# Patient Record
Sex: FEMALE | Race: WHITE | NOT HISPANIC OR LATINO | Employment: FULL TIME | URBAN - METROPOLITAN AREA
[De-identification: names, ages, dates, MRNs, and addresses within clinical notes are randomized per-mention and may not be internally consistent; named-entity substitution may affect disease eponyms.]

---

## 2017-01-26 ENCOUNTER — OFFICE VISIT - HEALTHEAST (OUTPATIENT)
Dept: FAMILY MEDICINE | Facility: CLINIC | Age: 57
End: 2017-01-26

## 2017-01-26 DIAGNOSIS — D23.9 DYSPLASTIC NEVI: ICD-10-CM

## 2017-01-26 DIAGNOSIS — B00.89 HERPES DERMATITIS: ICD-10-CM

## 2017-01-26 DIAGNOSIS — E55.9 VITAMIN D DEFICIENCY: ICD-10-CM

## 2017-01-26 DIAGNOSIS — R06.83 SNORING: ICD-10-CM

## 2017-01-26 DIAGNOSIS — Z00.00 ROUTINE GENERAL MEDICAL EXAMINATION AT A HEALTH CARE FACILITY: ICD-10-CM

## 2017-01-26 DIAGNOSIS — R35.0 URINARY FREQUENCY: ICD-10-CM

## 2017-01-26 DIAGNOSIS — D12.6 ADENOMATOUS COLON POLYP: ICD-10-CM

## 2017-01-26 DIAGNOSIS — L72.3 SEBACEOUS CYST: ICD-10-CM

## 2017-01-26 DIAGNOSIS — R53.83 FATIGUE: ICD-10-CM

## 2017-01-26 DIAGNOSIS — G43.009 MIGRAINE WITHOUT AURA: ICD-10-CM

## 2017-01-26 DIAGNOSIS — E78.5 HYPERLIPIDEMIA: ICD-10-CM

## 2017-01-26 DIAGNOSIS — K92.1 BLOOD IN STOOL: ICD-10-CM

## 2017-01-26 LAB
CHOLEST SERPL-MCNC: 211 MG/DL
FASTING STATUS PATIENT QL REPORTED: ABNORMAL
HDLC SERPL-MCNC: 63 MG/DL
LDLC SERPL CALC-MCNC: 132 MG/DL
TRIGL SERPL-MCNC: 80 MG/DL

## 2017-01-26 ASSESSMENT — MIFFLIN-ST. JEOR: SCORE: 1341.63

## 2017-02-06 ENCOUNTER — HOSPITAL ENCOUNTER (OUTPATIENT)
Dept: MAMMOGRAPHY | Facility: HOSPITAL | Age: 57
Discharge: HOME | End: 2017-02-06
Attending: FAMILY MEDICINE

## 2017-02-06 DIAGNOSIS — L72.3 SEBACEOUS CYST: ICD-10-CM

## 2017-02-06 DIAGNOSIS — Z00.00 ROUTINE GENERAL MEDICAL EXAMINATION AT A HEALTH CARE FACILITY: ICD-10-CM

## 2017-02-13 ENCOUNTER — AMBULATORY - HEALTHEAST (OUTPATIENT)
Dept: FAMILY MEDICINE | Facility: CLINIC | Age: 57
End: 2017-02-13

## 2017-02-13 ENCOUNTER — COMMUNICATION - HEALTHEAST (OUTPATIENT)
Dept: FAMILY MEDICINE | Facility: CLINIC | Age: 57
End: 2017-02-13

## 2017-02-15 ENCOUNTER — AMBULATORY - HEALTHEAST (OUTPATIENT)
Dept: SURGERY | Facility: CLINIC | Age: 57
End: 2017-02-15

## 2017-02-15 ENCOUNTER — OFFICE VISIT - HEALTHEAST (OUTPATIENT)
Dept: SURGERY | Facility: CLINIC | Age: 57
End: 2017-02-15

## 2017-02-15 DIAGNOSIS — N63.10 BREAST MASS, RIGHT: ICD-10-CM

## 2017-02-15 ASSESSMENT — MIFFLIN-ST. JEOR: SCORE: 1346.17

## 2017-02-16 ENCOUNTER — RECORDS - HEALTHEAST (OUTPATIENT)
Dept: ADMINISTRATIVE | Facility: OTHER | Age: 57
End: 2017-02-16

## 2017-02-16 ENCOUNTER — AMBULATORY - HEALTHEAST (OUTPATIENT)
Dept: SURGERY | Facility: CLINIC | Age: 57
End: 2017-02-16

## 2017-03-01 ENCOUNTER — OFFICE VISIT - HEALTHEAST (OUTPATIENT)
Dept: SURGERY | Facility: CLINIC | Age: 57
End: 2017-03-01

## 2017-03-01 DIAGNOSIS — Z48.89 POSTOPERATIVE VISIT: ICD-10-CM

## 2017-03-01 ASSESSMENT — MIFFLIN-ST. JEOR: SCORE: 1341.63

## 2017-03-10 ENCOUNTER — RECORDS - HEALTHEAST (OUTPATIENT)
Dept: ADMINISTRATIVE | Facility: OTHER | Age: 57
End: 2017-03-10

## 2017-03-23 ENCOUNTER — OFFICE VISIT - HEALTHEAST (OUTPATIENT)
Dept: FAMILY MEDICINE | Facility: CLINIC | Age: 57
End: 2017-03-23

## 2017-03-23 DIAGNOSIS — R05.9 COUGH: ICD-10-CM

## 2017-03-23 DIAGNOSIS — J01.00 ACUTE MAXILLARY SINUSITIS, RECURRENCE NOT SPECIFIED: ICD-10-CM

## 2017-04-13 ENCOUNTER — OFFICE VISIT - HEALTHEAST (OUTPATIENT)
Dept: FAMILY MEDICINE | Facility: CLINIC | Age: 57
End: 2017-04-13

## 2017-04-13 DIAGNOSIS — Z79.899 MEDICATION MANAGEMENT: ICD-10-CM

## 2017-07-13 ENCOUNTER — OFFICE VISIT - HEALTHEAST (OUTPATIENT)
Dept: FAMILY MEDICINE | Facility: CLINIC | Age: 57
End: 2017-07-13

## 2017-07-13 DIAGNOSIS — Z79.899 HIGH RISK MEDICATION USE: ICD-10-CM

## 2017-10-12 ENCOUNTER — OFFICE VISIT - HEALTHEAST (OUTPATIENT)
Dept: FAMILY MEDICINE | Facility: CLINIC | Age: 57
End: 2017-10-12

## 2017-10-12 DIAGNOSIS — G43.009 MIGRAINE WITHOUT AURA: ICD-10-CM

## 2017-10-12 DIAGNOSIS — Z79.899 HIGH RISK MEDICATION USE: ICD-10-CM

## 2017-10-18 ENCOUNTER — COMMUNICATION - HEALTHEAST (OUTPATIENT)
Dept: FAMILY MEDICINE | Facility: CLINIC | Age: 57
End: 2017-10-18

## 2017-11-15 ENCOUNTER — RECORDS - HEALTHEAST (OUTPATIENT)
Dept: ADMINISTRATIVE | Facility: OTHER | Age: 57
End: 2017-11-15

## 2017-12-14 ENCOUNTER — OFFICE VISIT - HEALTHEAST (OUTPATIENT)
Dept: FAMILY MEDICINE | Facility: CLINIC | Age: 57
End: 2017-12-14

## 2017-12-14 DIAGNOSIS — J32.9 SINOBRONCHITIS: ICD-10-CM

## 2017-12-14 DIAGNOSIS — J40 SINOBRONCHITIS: ICD-10-CM

## 2017-12-18 ENCOUNTER — OFFICE VISIT - HEALTHEAST (OUTPATIENT)
Dept: FAMILY MEDICINE | Facility: CLINIC | Age: 57
End: 2017-12-18

## 2017-12-18 ENCOUNTER — RECORDS - HEALTHEAST (OUTPATIENT)
Dept: ADMINISTRATIVE | Facility: OTHER | Age: 57
End: 2017-12-18

## 2017-12-18 DIAGNOSIS — Z01.818 PRE-OP EXAM: ICD-10-CM

## 2017-12-18 ASSESSMENT — MIFFLIN-ST. JEOR: SCORE: 1272.9

## 2017-12-27 RX ORDER — PHENTERMINE HYDROCHLORIDE 37.5 MG/1
37.5 CAPSULE ORAL DAILY
COMMUNITY
End: 2021-07-30

## 2017-12-28 ENCOUNTER — ANESTHESIA (OUTPATIENT)
Dept: SURGERY | Facility: CLINIC | Age: 57
End: 2017-12-28
Payer: COMMERCIAL

## 2017-12-28 ENCOUNTER — HOSPITAL ENCOUNTER (OUTPATIENT)
Facility: CLINIC | Age: 57
Discharge: HOME | End: 2017-12-28
Attending: OPHTHALMOLOGY | Admitting: OPHTHALMOLOGY
Payer: COMMERCIAL

## 2017-12-28 ENCOUNTER — ANESTHESIA EVENT (OUTPATIENT)
Dept: SURGERY | Facility: CLINIC | Age: 57
End: 2017-12-28
Payer: COMMERCIAL

## 2017-12-28 VITALS
RESPIRATION RATE: 16 BRPM | OXYGEN SATURATION: 95 % | SYSTOLIC BLOOD PRESSURE: 130 MMHG | HEIGHT: 63 IN | BODY MASS INDEX: 29.41 KG/M2 | DIASTOLIC BLOOD PRESSURE: 95 MMHG | WEIGHT: 166 LBS | TEMPERATURE: 98.2 F

## 2017-12-28 PROCEDURE — 40000170 ZZH STATISTIC PRE-PROCEDURE ASSESSMENT II: Performed by: OPHTHALMOLOGY

## 2017-12-28 PROCEDURE — 37000009 ZZH ANESTHESIA TECHNICAL FEE, EACH ADDTL 15 MIN: Performed by: OPHTHALMOLOGY

## 2017-12-28 PROCEDURE — 25000128 H RX IP 250 OP 636: Performed by: NURSE ANESTHETIST, CERTIFIED REGISTERED

## 2017-12-28 PROCEDURE — 25000125 ZZHC RX 250: Performed by: OPHTHALMOLOGY

## 2017-12-28 PROCEDURE — 36000058 ZZH SURGERY LEVEL 3 EA 15 ADDTL MIN: Performed by: OPHTHALMOLOGY

## 2017-12-28 PROCEDURE — 71000027 ZZH RECOVERY PHASE 2 EACH 15 MINS: Performed by: OPHTHALMOLOGY

## 2017-12-28 PROCEDURE — 37000008 ZZH ANESTHESIA TECHNICAL FEE, 1ST 30 MIN: Performed by: OPHTHALMOLOGY

## 2017-12-28 PROCEDURE — 36000056 ZZH SURGERY LEVEL 3 1ST 30 MIN: Performed by: OPHTHALMOLOGY

## 2017-12-28 PROCEDURE — 71000012 ZZH RECOVERY PHASE 1 LEVEL 1 FIRST HR: Performed by: OPHTHALMOLOGY

## 2017-12-28 PROCEDURE — 27210794 ZZH OR GENERAL SUPPLY STERILE: Performed by: OPHTHALMOLOGY

## 2017-12-28 PROCEDURE — 25000125 ZZHC RX 250: Performed by: NURSE ANESTHETIST, CERTIFIED REGISTERED

## 2017-12-28 RX ORDER — ONDANSETRON 4 MG/1
4 TABLET, ORALLY DISINTEGRATING ORAL EVERY 30 MIN PRN
Status: DISCONTINUED | OUTPATIENT
Start: 2017-12-28 | End: 2017-12-28 | Stop reason: HOSPADM

## 2017-12-28 RX ORDER — MEPERIDINE HYDROCHLORIDE 25 MG/ML
12.5 INJECTION INTRAMUSCULAR; INTRAVENOUS; SUBCUTANEOUS
Status: DISCONTINUED | OUTPATIENT
Start: 2017-12-28 | End: 2017-12-28 | Stop reason: HOSPADM

## 2017-12-28 RX ORDER — NALOXONE HYDROCHLORIDE 0.4 MG/ML
.1-.4 INJECTION, SOLUTION INTRAMUSCULAR; INTRAVENOUS; SUBCUTANEOUS
Status: DISCONTINUED | OUTPATIENT
Start: 2017-12-28 | End: 2017-12-28 | Stop reason: HOSPADM

## 2017-12-28 RX ORDER — HYDROMORPHONE HYDROCHLORIDE 1 MG/ML
.3-.5 INJECTION, SOLUTION INTRAMUSCULAR; INTRAVENOUS; SUBCUTANEOUS EVERY 10 MIN PRN
Status: DISCONTINUED | OUTPATIENT
Start: 2017-12-28 | End: 2017-12-28 | Stop reason: HOSPADM

## 2017-12-28 RX ORDER — SODIUM CHLORIDE, SODIUM LACTATE, POTASSIUM CHLORIDE, CALCIUM CHLORIDE 600; 310; 30; 20 MG/100ML; MG/100ML; MG/100ML; MG/100ML
INJECTION, SOLUTION INTRAVENOUS CONTINUOUS
Status: DISCONTINUED | OUTPATIENT
Start: 2017-12-28 | End: 2017-12-28 | Stop reason: HOSPADM

## 2017-12-28 RX ORDER — FENTANYL CITRATE 50 UG/ML
25-50 INJECTION, SOLUTION INTRAMUSCULAR; INTRAVENOUS
Status: DISCONTINUED | OUTPATIENT
Start: 2017-12-28 | End: 2017-12-28 | Stop reason: HOSPADM

## 2017-12-28 RX ORDER — FENTANYL CITRATE 50 UG/ML
INJECTION, SOLUTION INTRAMUSCULAR; INTRAVENOUS PRN
Status: DISCONTINUED | OUTPATIENT
Start: 2017-12-28 | End: 2017-12-28

## 2017-12-28 RX ORDER — PHYSOSTIGMINE SALICYLATE 1 MG/ML
1.2 INJECTION INTRAVENOUS
Status: DISCONTINUED | OUTPATIENT
Start: 2017-12-28 | End: 2017-12-28 | Stop reason: HOSPADM

## 2017-12-28 RX ORDER — ONDANSETRON 2 MG/ML
4 INJECTION INTRAMUSCULAR; INTRAVENOUS EVERY 30 MIN PRN
Status: DISCONTINUED | OUTPATIENT
Start: 2017-12-28 | End: 2017-12-28 | Stop reason: HOSPADM

## 2017-12-28 RX ORDER — SODIUM CHLORIDE, SODIUM LACTATE, POTASSIUM CHLORIDE, CALCIUM CHLORIDE 600; 310; 30; 20 MG/100ML; MG/100ML; MG/100ML; MG/100ML
INJECTION, SOLUTION INTRAVENOUS CONTINUOUS PRN
Status: DISCONTINUED | OUTPATIENT
Start: 2017-12-28 | End: 2017-12-28

## 2017-12-28 RX ORDER — FENTANYL CITRATE 50 UG/ML
25-50 INJECTION, SOLUTION INTRAMUSCULAR; INTRAVENOUS EVERY 5 MIN PRN
Status: DISCONTINUED | OUTPATIENT
Start: 2017-12-28 | End: 2017-12-28 | Stop reason: HOSPADM

## 2017-12-28 RX ORDER — PROPOFOL 10 MG/ML
INJECTION, EMULSION INTRAVENOUS PRN
Status: DISCONTINUED | OUTPATIENT
Start: 2017-12-28 | End: 2017-12-28

## 2017-12-28 RX ORDER — ERYTHROMYCIN 5 MG/G
OINTMENT OPHTHALMIC PRN
Status: DISCONTINUED | OUTPATIENT
Start: 2017-12-28 | End: 2017-12-28 | Stop reason: HOSPADM

## 2017-12-28 RX ORDER — MULTIVITAMIN WITH IRON
1 TABLET ORAL DAILY
COMMUNITY
End: 2021-12-22

## 2017-12-28 RX ORDER — LIDOCAINE HCL/EPINEPHRINE/PF 2%-1:200K
VIAL (ML) INJECTION PRN
Status: DISCONTINUED | OUTPATIENT
Start: 2017-12-28 | End: 2017-12-28 | Stop reason: HOSPADM

## 2017-12-28 RX ORDER — ONDANSETRON 2 MG/ML
INJECTION INTRAMUSCULAR; INTRAVENOUS PRN
Status: DISCONTINUED | OUTPATIENT
Start: 2017-12-28 | End: 2017-12-28

## 2017-12-28 RX ADMIN — PROPOFOL 80 MG: 10 INJECTION, EMULSION INTRAVENOUS at 12:33

## 2017-12-28 RX ADMIN — FENTANYL CITRATE 25 MCG: 50 INJECTION, SOLUTION INTRAMUSCULAR; INTRAVENOUS at 12:54

## 2017-12-28 RX ADMIN — FENTANYL CITRATE 25 MCG: 50 INJECTION, SOLUTION INTRAMUSCULAR; INTRAVENOUS at 13:02

## 2017-12-28 RX ADMIN — FENTANYL CITRATE 50 MCG: 50 INJECTION, SOLUTION INTRAMUSCULAR; INTRAVENOUS at 12:22

## 2017-12-28 RX ADMIN — ONDANSETRON 4 MG: 2 INJECTION INTRAMUSCULAR; INTRAVENOUS at 12:22

## 2017-12-28 RX ADMIN — MIDAZOLAM 2 MG: 1 INJECTION INTRAMUSCULAR; INTRAVENOUS at 12:22

## 2017-12-28 RX ADMIN — SODIUM CHLORIDE, POTASSIUM CHLORIDE, SODIUM LACTATE AND CALCIUM CHLORIDE: 600; 310; 30; 20 INJECTION, SOLUTION INTRAVENOUS at 12:19

## 2017-12-28 RX ADMIN — DEXMEDETOMIDINE HYDROCHLORIDE 8 MCG: 100 INJECTION, SOLUTION INTRAVENOUS at 12:22

## 2017-12-28 NOTE — ANESTHESIA PREPROCEDURE EVALUATION
Anesthesia Evaluation     . Pt has had prior anesthetic. Type: General           ROS/MED HX    ENT/Pulmonary:       Neurologic:     (+)migraines,     Cardiovascular:     (+) Dyslipidemia, ----. : . . . :. .       METS/Exercise Tolerance:     Hematologic:         Musculoskeletal:         GI/Hepatic:         Renal/Genitourinary:         Endo:         Psychiatric:         Infectious Disease:         Malignancy:         Other:                                    Anesthesia Plan      History & Physical Review  History and physical reviewed and following examination; no interval change.    ASA Status:  2 .        Plan for MAC with Intravenous induction. Maintenance will be TIVA.    PONV prophylaxis:  Ondansetron (or other 5HT-3) and Dexamethasone or Solumedrol       Postoperative Care  Postoperative pain management:  IV analgesics and Oral pain medications.      Consents  Anesthetic plan, risks, benefits and alternatives discussed with:  Patient..                          .

## 2017-12-28 NOTE — ANESTHESIA POSTPROCEDURE EVALUATION
Patient: Carole Reich    Procedure(s):  BILATERAL UPPER LID PTOSIS AND MECHANICAL PTOSIS REPAIR(MAC) - Wound Class: I-Clean    Diagnosis:BILATERAL UPPER LID PTOSIS AND MECHANICAL PTOSIS  Diagnosis Additional Information: No value filed.    Anesthesia Type:  MAC    Note:  Anesthesia Post Evaluation    Patient location during evaluation: PACU  Patient participation: Able to fully participate in evaluation  Level of consciousness: awake  Pain management: adequate  Airway patency: patent  Cardiovascular status: acceptable  Respiratory status: acceptable  Hydration status: acceptable  PONV: none     Anesthetic complications: None          Last vitals:  Vitals:    12/28/17 1351 12/28/17 1400 12/28/17 1415   BP: (!) 132/91 (!) 132/95 (!) 130/98   Resp: 19 11 16   Temp: 36.8  C (98.2  F)     SpO2: 95% 95% 93%         Electronically Signed By: Jerzy Saenz MD  December 28, 2017  2:30 PM

## 2017-12-28 NOTE — IP AVS SNAPSHOT
MRN:[id number]                      After Visit Summary   12/28/2017    Carole Reich    MRN: [id number]           Thank you!     Thank you for choosing [facility] for your care. Our goal is always to provide you with excellent care. Hearing back from our patients is one way we can continue to improve our services. Please take a few minutes to complete the written survey that you may receive in the mail after you visit with us. Thank you!        Patient Information     Date Of Birth          1960        About your hospital stay     You were admitted on:  December 28, 2017 You last received care in the:  [facility]    You were discharged on:  December 28, 2017       Who to Call     For medical emergencies, please call 911.  For non-urgent questions about your medical care, please call your primary care provider or clinic, [phone]  For questions related to your surgery, please call your surgery clinic        Attending Provider     Provider Specialty    Ryder Suresh MD Ophthalmology       Primary Care Provider Office Phone # Fax #    Minerva Lockhart [phone]      Further instructions from your care team       Same Day Surgery Discharge Instructions for  Sedation and General Anesthesia       It's not unusual to feel dizzy, light-headed or faint for up to 24 hours after surgery or while taking pain medication.  If you have these symptoms: sit for a few minutes before standing and have someone assist you when you get up to walk or use the bathroom.      You should rest and relax for the next 24 hours. We recommend you make arrangements to have an adult stay with you for at least 24 hours after your discharge.  Avoid hazardous and strenuous activity.      DO NOT DRIVE any vehicle or operate mechanical equipment for 24 hours following the end of your surgery.  Even though you may feel normal, your reactions may be affected by the medication  you have received.      Do not drink alcoholic beverages for 24 hours following surgery.       Slowly progress to your regular diet as you feel able. It's not unusual to feel nauseated and/or vomit after receiving anesthesia.  If you develop these symptoms, drink clear liquids (apple juice, ginger ale, broth, 7-up, etc. ) until you feel better.  If your nausea and vomiting persists for 24 hours, please notify your surgeon.        All narcotic pain medications, along with inactivity and anesthesia, can cause constipation. Drinking plenty of liquids and increasing fiber intake will help.      For any questions of a medical nature, call your surgeon.      Do not make important decisions for 24 hours.      If you had general anesthesia, you may have a sore throat for a couple of days related to the breathing tube used during surgery.  You may use Cepacol lozenges to help with this discomfort.  If it worsens or if you develop a fever, contact your surgeon.       If you feel your pain is not well managed with the pain medications prescribed by your surgeon, please contact your surgeon's office to let them know so they can address your concerns.     [facility]   Eyelid/Orbital Surgery Discharge Instructions    Ryder Suresh M.D..     ICE COMPRESSES  Immediately following surgery, you should begin to apply ice compresses.  Apply a cold gel pack or wrap a clean washcloth around a cup of crushed ice in a plastic bag (a bag of frozen peas also works well) and hold the cold compresses directly against the closed eyelid (s).Apply cold pack for a minimum of six times daily for no longer than 15 minutes at a time. Continue cold compresses every day until the bruising and swelling begin to subside.  This can vary for each patient, but three 3 days may be common.    HOT COMPRESSES  After your swelling and bruising have begun to subside, hot compresses should be applied.  Take a clean washcloth and wring it out in  hot water (as warm as you can tolerate comfortably).  Hold this warm compress against the closed eyelid(s) at least six times per day for 15 minutes.  This should be continued for about two weeks.    OINTMENT  You may be given some ointment when you leave the hospital.  Apply this ointment to the suture line(s) twice a day, 1/4 inch into the eye at bedtime for 7 days.  Expect some blurring of vision from the ointment.     ACTIVITY  Avoid heavy lifting or vigorous exercise for one week after surgery.  You may resume regular activities as tolerated.  You may shower and wash your hair on the day after surgery; be careful to avoid getting shampoo in your eyes. While your eyes are still swelling, it is recommended you sleep on your back and elevate your head with 2-3 pillows.    MEDICATION  If the doctor has given you some medications to take after surgery, please take these according to the instructions on the bottle.  Pain medications may make you drowsy so do not drive, operate heavy machinery, or use alcohol while taking it.  When you feel that you do not need the prescription pain medication, you may substitute Extra Strength Tylenol for mild pain by also following the directions on the bottle.    If you were taking Coumadin (warfarin) prior to your surgery, you may resume this medication with your next scheduled dose.    WHAT TO EXPECT  You should expect some slight oozing of blood from the incision site over the first two to three days after surgery.  Swelling and bruising will occur for one to two weeks or longer.  You may also experience itching and tearing during the first several weeks after surgery.  This is part of the normal healing process    QUESTIONS  Please feel free to contact the office, should you have any questions that are not answered above.  The phone number is [phone].  Please call immediately if you are unable to establish vision in the operative eye, you are experiencing heavy bleeding  "that will not stop with gentle pressure or you have any signs of an infection (greenish/yellow discharge or progressive redness).    [facility]  [facility]  [address]  [place] [id number] [phone]      ONE NORCO GIVEN AT 2:00pm       Pending Results     No orders found from 2017 to 2017.            Admission Information     Date & Time Provider Department Dept. Phone    2017 Ryder Suresh MD [facility] Same Day Surgery [phone]      Your Vitals Were     Blood Pressure Temperature Respirations Height Weight Pulse Oximetry    132/91 98.2  F (36.8  C) (Temporal) 19 1.6 m (5' 3\") 75.3 kg (166 lb) 95%    BMI (Body Mass Index)                   29.41 kg/m2           MyChart Information     [employer] lets you send messages to your doctor, view your test results, renew your prescriptions, schedule appointments and more. To sign up, go to [email or web address] . Click on \"Log in\" on the left side of the screen, which will take you to the Welcome page. Then click on \"Sign up Now\" on the right side of the page.     You will be asked to enter the access code listed below, as well as some personal information. Please follow the directions to create your username and password.     Your access code is: [email or web address]  Expires: 3/28/2018  1:58 PM     Your access code will  in 90 days. If you need help or a new code, please call your [facility] clinic or [phone].        Care EveryWhere ID     This is your Care EveryWhere ID. This could be used by other organizations to access your [facility] medical records  [id number]        Equal Access to Services     RIZWANA GOFF : Noy Pinto, wavinda richard, qaybta kaalmaeugene jeong. So [facility] [phone].    ATENCIÓN: Si habla español, tiene a morejon disposición servicios gratuitos de asistencia " lingüístictamekaShayy [place] al [phone].    We comply with applicable federal civil rights laws and [place] laws. We do not discriminate on the basis of race, color, national origin, age, disability, sex, sexual orientation, or gender identity.               Review of your medicines      UNREVIEWED medicines. Ask your doctor about these medicines        Dose / Directions    magnesium 250 MG tablet        Dose:  1 tablet   Take 1 tablet by mouth daily   Refills:  0       phentermine 37.5 MG capsule        Dose:  37.5 mg   Take 37.5 mg by mouth daily   Refills:  0       VALTREX PO        Dose:  500 mg   Take 500 mg by mouth 2 times daily   Refills:  0       ZOMIG PO        Dose:  2.5 mg   Take 2.5 mg by mouth as needed for migraine (may repeat in two hours)   Refills:  0               ANTIBIOTIC INSTRUCTION     You've Been Prescribed an Antibiotic - Now What?  Your healthcare team thinks that you or your loved one might have an infection. Some infections can be treated with antibiotics, which are powerful, life-saving drugs. Like all medications, antibiotics have side effects and should only be used when necessary. There are some important things you should know about your antibiotic treatment.      Your healthcare team may run tests before you start taking an antibiotic.    Your team may take samples (e.g., from your blood, urine or other areas) to run tests to look for bacteria. These test can be important to determine if you need an antibiotic at all and, if you do, which antibiotic will work best.      Within a few days, your healthcare team might change or even stop your antibiotic.    Your team may start you on an antibiotic while they are working to find out what is making you sick.    Your team might change your antibiotic because test results show that a different antibiotic would be better to treat your infection.    In some cases, once your team has more information, they learn that you do not need an  antibiotic at all. They may find out that you don't have an infection, or that the antibiotic you're taking won't work against your infection. For example, an infection caused by a virus can't be treated with antibiotics. Staying on an antibiotic when you don't need it is more likely to be harmful than helpful.      You may experience side effects from your antibiotic.    Like all medications, antibiotics have side effects. Some of these can be serious.    Let you healthcare team know if you have any known allergies when you are admitted to the hospital.    One significant side effect of nearly all antibiotics is the risk of severe and sometimes deadly diarrhea caused by Clostridium difficile (C. Difficile). This occurs when a person takes antibiotics because some good germs are destroyed. Antibiotic use allows C. diificile to take over, putting patients at high risk for this serious infection.    As a patient or caregiver, it is important to understand your or your loved one's antibiotic treatment. It is especially important for caregivers to speak up when patients can't speak for themselves. Here are some important questions to ask your healthcare team.    What infection is this antibiotic treating and how do you know I have that infection?    What side effects might occur from this antibiotic?    How long will I need to take this antibiotic?    Is it safe to take this antibiotic with other medications or supplements (e.g., vitamins) that I am taking?     Are there any special directions I need to know about taking this antibiotic? For example, should I take it with food?    How will I be monitored to know whether my infection is responding to the antibiotic?    What tests may help to make sure the right antibiotic is prescribed for me?      Information provided by:  www.cdc.gov/getsmart  U.S. Department of Health and Human Services  Centers for disease Control and Prevention  National Center for Emerging and  Zoonotic Infectious Diseases  Division of Healthcare Quality Promotion         Protect others around you: Learn how to safely use, store and throw away your medicines at www.disposemymeds.org.             Medication List: This is a list of all your medications and when to take them. Check marks below indicate your daily home schedule. Keep this list as a reference.      Medications           Morning Afternoon Evening Bedtime As Needed    magnesium 250 MG tablet   Take 1 tablet by mouth daily                                phentermine 37.5 MG capsule   Take 37.5 mg by mouth daily                                VALTREX PO   Take 500 mg by mouth 2 times daily                                ZOMIG PO   Take 2.5 mg by mouth as needed for migraine (may repeat in two hours)

## 2017-12-28 NOTE — ANESTHESIA CARE TRANSFER NOTE
Patient: Carole Reich    Procedure(s):  BILATERAL UPPER LID PTOSIS AND MECHANICAL PTOSIS REPAIR(MAC) - Wound Class: I-Clean    Diagnosis: BILATERAL UPPER LID PTOSIS AND MECHANICAL PTOSIS  Diagnosis Additional Information: No value filed.    Anesthesia Type:   MAC     Note:  Airway :Room Air  Patient transferred to:PACU  Comments: Susanne Report: Identifed the Patient, Identified the Reponsible Provider, Reviewed the pertinent medical history, Discussed the surgical course, Reviewed Intra-OP anesthesia mangement and issues during anesthesia, Set expectations for post-procedure period and Allowed opportunity for questions and acknowledgement of understanding      Vitals: (Last set prior to Anesthesia Care Transfer)    CRNA VITALS  12/28/2017 1302 - 12/28/2017 1332      12/28/2017             Resp Rate (set): 10                Electronically Signed By: MYLENE Prajapati CRNA  December 28, 2017  1:32 PM

## 2017-12-28 NOTE — DISCHARGE INSTRUCTIONS
Same Day Surgery Discharge Instructions for  Sedation and General Anesthesia       It's not unusual to feel dizzy, light-headed or faint for up to 24 hours after surgery or while taking pain medication.  If you have these symptoms: sit for a few minutes before standing and have someone assist you when you get up to walk or use the bathroom.      You should rest and relax for the next 24 hours. We recommend you make arrangements to have an adult stay with you for at least 24 hours after your discharge.  Avoid hazardous and strenuous activity.      DO NOT DRIVE any vehicle or operate mechanical equipment for 24 hours following the end of your surgery.  Even though you may feel normal, your reactions may be affected by the medication you have received.      Do not drink alcoholic beverages for 24 hours following surgery.       Slowly progress to your regular diet as you feel able. It's not unusual to feel nauseated and/or vomit after receiving anesthesia.  If you develop these symptoms, drink clear liquids (apple juice, ginger ale, broth, 7-up, etc. ) until you feel better.  If your nausea and vomiting persists for 24 hours, please notify your surgeon.        All narcotic pain medications, along with inactivity and anesthesia, can cause constipation. Drinking plenty of liquids and increasing fiber intake will help.      For any questions of a medical nature, call your surgeon.      Do not make important decisions for 24 hours.      If you had general anesthesia, you may have a sore throat for a couple of days related to the breathing tube used during surgery.  You may use Cepacol lozenges to help with this discomfort.  If it worsens or if you develop a fever, contact your surgeon.       If you feel your pain is not well managed with the pain medications prescribed by your surgeon, please contact your surgeon's office to let them know so they can address your concerns.     [facility]   Eyelid/Orbital  Surgery Discharge Instructions    Ryder Suresh M.D..     ICE COMPRESSES  Immediately following surgery, you should begin to apply ice compresses.  Apply a cold gel pack or wrap a clean washcloth around a cup of crushed ice in a plastic bag (a bag of frozen peas also works well) and hold the cold compresses directly against the closed eyelid (s).Apply cold pack for a minimum of six times daily for no longer than 15 minutes at a time. Continue cold compresses every day until the bruising and swelling begin to subside.  This can vary for each patient, but three 3 days may be common.    HOT COMPRESSES  After your swelling and bruising have begun to subside, hot compresses should be applied.  Take a clean washcloth and wring it out in hot water (as warm as you can tolerate comfortably).  Hold this warm compress against the closed eyelid(s) at least six times per day for 15 minutes.  This should be continued for about two weeks.    OINTMENT  You may be given some ointment when you leave the hospital.  Apply this ointment to the suture line(s) twice a day, 1/4 inch into the eye at bedtime for 7 days.  Expect some blurring of vision from the ointment.     ACTIVITY  Avoid heavy lifting or vigorous exercise for one week after surgery.  You may resume regular activities as tolerated.  You may shower and wash your hair on the day after surgery; be careful to avoid getting shampoo in your eyes. While your eyes are still swelling, it is recommended you sleep on your back and elevate your head with 2-3 pillows.    MEDICATION  If the doctor has given you some medications to take after surgery, please take these according to the instructions on the bottle.  Pain medications may make you drowsy so do not drive, operate heavy machinery, or use alcohol while taking it.  When you feel that you do not need the prescription pain medication, you may substitute Extra Strength Tylenol for mild pain by also following the directions on the  bottle.    If you were taking Coumadin (warfarin) prior to your surgery, you may resume this medication with your next scheduled dose.    WHAT TO EXPECT  You should expect some slight oozing of blood from the incision site over the first two to three days after surgery.  Swelling and bruising will occur for one to two weeks or longer.  You may also experience itching and tearing during the first several weeks after surgery.  This is part of the normal healing process    QUESTIONS  Please feel free to contact the office, should you have any questions that are not answered above.  The phone number is [phone].  Please call immediately if you are unable to establish vision in the operative eye, you are experiencing heavy bleeding that will not stop with gentle pressure or you have any signs of an infection (greenish/yellow discharge or progressive redness).    [facility]  [facility] [place] [address]  [place], [place] [address]  [phone]      ONE NORCO GIVEN AT 2:00pm

## 2017-12-28 NOTE — IP AVS SNAPSHOT
[facility]    [address]    [place] [address]    Phone:  [phone]    Fax:  [phone]                                       After Visit Summary   12/28/2017    Carole Reich    MRN: [id number]           After Visit Summary Signature Page     I have received my discharge instructions, and my questions have been answered. I have discussed any challenges I see with this plan with the nurse or doctor.    ..........................................................................................................................................  Patient/Patient Representative Signature      ..........................................................................................................................................  Patient Representative Print Name and Relationship to Patient    ..................................................               ................................................  Date                                            Time    ..........................................................................................................................................  Reviewed by Signature/Title    ...................................................              ..............................................  Date                                                            Time

## 2018-01-04 NOTE — OP NOTE
DATE OF PROCEDURE:  12/28/2017      PREOPERATIVE DIAGNOSES:   1.  Bilateral upper eyelid ptosis.   2.  Bilateral upper eyelid mechanical ptosis.       POSTOPERATIVE DIAGNOSES:   1.  Bilateral upper eyelid ptosis.   2.  Bilateral upper eyelid mechanical ptosis.      PROCEDURE:     1.  Repair bilateral upper eyelid ptosis.   2.  Repair bilateral upper mechanical ptosis.       SURGEON:  Ryder Suresh MD      ANESTHESIA:  Local monitored.      COMPLICATIONS:  None.      INDICATIONS FOR SURGERY:  Carole Reich had bilateral upper eyelid ptosis obstructing the vision and interfering with daily activities.  Patient also had excessive skin overhanging the upper eyelids, contributing to visual obstruction.      PROCEDURE:  The patient was taken to the operating room and received a local block of 2% Lidocaine without epinephrine.  The block was administered transcutaneously along the eyelid crease and the planned incision line was outlined with a marking pen.  The patient was then prepped and draped in the usual sterile fashion.  The incision was then made along the previously marked area.  A moderate amount of skin was excised taking care to allow adequate closure and avoid lagophthalmos. Randall scissors were then used to develop a plane over the septum.  The septum was opened and a small amount of orbital fat was removed.  Levator aponeurosis was then disinserted from the tarsus and a plane was developed between the aponeurosis and the underlying tarsus and Rodriguez's muscle.  A 5-0 Mersilene suture was then passed through the tarsus in a lamellar fashion and externalized through the levator aponeurosis.  This was tied off in a temporary fashion and the patient was asked to sit up and the eyelids height and contour evaluated.  This was repeated until a satisfactory height and contour were obtained, and two additional sutures were placed lateral to the initial suture.  This resulted in a normal contour and height to the  bilateral upper eyelid.  Attempted overcorrection of 0.5 mm was obtained.  The redundant levator aponeurosis was then excised and the skin was then closed with running 6-0 fast absorbing suture.  The patient left the operating room in stable condition.         FLOR CACERES MD             D: 2018 13:03   T: 2018 21:48   MT: [id number]      Name:     DESMOND MICHELE   MRN:      [id number]        Account:        [id number]   :      1960           Procedure Date: 2017      Document: [id number]

## 2018-01-15 ENCOUNTER — RECORDS - HEALTHEAST (OUTPATIENT)
Dept: ADMINISTRATIVE | Facility: OTHER | Age: 58
End: 2018-01-15

## 2018-01-22 ENCOUNTER — COMMUNICATION - HEALTHEAST (OUTPATIENT)
Dept: FAMILY MEDICINE | Facility: CLINIC | Age: 58
End: 2018-01-22

## 2018-02-05 ENCOUNTER — OFFICE VISIT - HEALTHEAST (OUTPATIENT)
Dept: FAMILY MEDICINE | Facility: CLINIC | Age: 58
End: 2018-02-05

## 2018-02-05 DIAGNOSIS — D23.9 DYSPLASTIC NEVI: ICD-10-CM

## 2018-02-05 DIAGNOSIS — Z78.0 POST-MENOPAUSE: ICD-10-CM

## 2018-02-05 DIAGNOSIS — E55.9 VITAMIN D DEFICIENCY: ICD-10-CM

## 2018-02-05 DIAGNOSIS — Z00.00 ROUTINE GENERAL MEDICAL EXAMINATION AT A HEALTH CARE FACILITY: ICD-10-CM

## 2018-02-05 DIAGNOSIS — Z12.11 SCREEN FOR COLON CANCER: ICD-10-CM

## 2018-02-05 DIAGNOSIS — D12.6 ADENOMATOUS COLON POLYP: ICD-10-CM

## 2018-02-05 DIAGNOSIS — E78.5 HYPERLIPIDEMIA: ICD-10-CM

## 2018-02-05 DIAGNOSIS — Z83.49 FAMILY HISTORY OF HYPOTHYROIDISM: ICD-10-CM

## 2018-02-05 DIAGNOSIS — Z79.899 HIGH RISK MEDICATION USE: ICD-10-CM

## 2018-02-05 DIAGNOSIS — Z12.31 ENCOUNTER FOR SCREENING MAMMOGRAM FOR MALIGNANT NEOPLASM OF BREAST: ICD-10-CM

## 2018-02-05 DIAGNOSIS — G43.009 MIGRAINE WITHOUT AURA: ICD-10-CM

## 2018-02-05 LAB
25(OH)D3 SERPL-MCNC: 34.7 NG/ML (ref 30–80)
25(OH)D3 SERPL-MCNC: 34.7 NG/ML (ref 30–80)
ALBUMIN SERPL-MCNC: 3.8 G/DL (ref 3.5–5)
ALP SERPL-CCNC: 56 U/L (ref 45–120)
ALT SERPL W P-5'-P-CCNC: 16 U/L (ref 0–45)
ANION GAP SERPL CALCULATED.3IONS-SCNC: 12 MMOL/L (ref 5–18)
AST SERPL W P-5'-P-CCNC: 24 U/L (ref 0–40)
BILIRUB SERPL-MCNC: 0.8 MG/DL (ref 0–1)
BUN SERPL-MCNC: 26 MG/DL (ref 8–22)
CALCIUM SERPL-MCNC: 9.3 MG/DL (ref 8.5–10.5)
CHLORIDE BLD-SCNC: 106 MMOL/L (ref 98–107)
CHOLEST SERPL-MCNC: 261 MG/DL
CO2 SERPL-SCNC: 24 MMOL/L (ref 22–31)
CREAT SERPL-MCNC: 0.75 MG/DL (ref 0.6–1.1)
ERYTHROCYTE [DISTWIDTH] IN BLOOD BY AUTOMATED COUNT: 12.8 % (ref 11–14.5)
FASTING STATUS PATIENT QL REPORTED: YES
GFR SERPL CREATININE-BSD FRML MDRD: >60 ML/MIN/1.73M2
GLUCOSE BLD-MCNC: 105 MG/DL (ref 70–125)
HCT VFR BLD AUTO: 42.3 % (ref 35–47)
HDLC SERPL-MCNC: 72 MG/DL
HGB BLD-MCNC: 13.9 G/DL (ref 12–16)
LDLC SERPL CALC-MCNC: 164 MG/DL
MCH RBC QN AUTO: 30.1 PG (ref 27–34)
MCHC RBC AUTO-ENTMCNC: 32.7 G/DL (ref 32–36)
MCV RBC AUTO: 92 FL (ref 80–100)
PLATELET # BLD AUTO: 291 THOU/UL (ref 140–440)
PMV BLD AUTO: 7.3 FL (ref 7–10)
POTASSIUM BLD-SCNC: 4 MMOL/L (ref 3.5–5)
PROT SERPL-MCNC: 7.1 G/DL (ref 6–8)
RBC # BLD AUTO: 4.61 MILL/UL (ref 3.8–5.4)
SODIUM SERPL-SCNC: 142 MMOL/L (ref 136–145)
TRIGL SERPL-MCNC: 125 MG/DL
TSH SERPL DL<=0.005 MIU/L-ACNC: 2.58 UIU/ML (ref 0.3–5)
WBC: 8.2 THOU/UL (ref 4–11)

## 2018-02-05 ASSESSMENT — MIFFLIN-ST. JEOR: SCORE: 1275.58

## 2018-03-31 ENCOUNTER — COMMUNICATION - HEALTHEAST (OUTPATIENT)
Dept: FAMILY MEDICINE | Facility: CLINIC | Age: 58
End: 2018-03-31

## 2018-03-31 DIAGNOSIS — B00.9 HERPES: ICD-10-CM

## 2018-05-03 ENCOUNTER — OFFICE VISIT - HEALTHEAST (OUTPATIENT)
Dept: FAMILY MEDICINE | Facility: CLINIC | Age: 58
End: 2018-05-03

## 2018-05-03 DIAGNOSIS — Z79.899 HIGH RISK MEDICATION USE: ICD-10-CM

## 2018-08-08 ENCOUNTER — OFFICE VISIT - HEALTHEAST (OUTPATIENT)
Dept: FAMILY MEDICINE | Facility: CLINIC | Age: 58
End: 2018-08-08

## 2018-08-08 DIAGNOSIS — B00.9 HERPES: ICD-10-CM

## 2018-08-08 DIAGNOSIS — B00.89 HERPES DERMATITIS: ICD-10-CM

## 2018-08-08 DIAGNOSIS — G43.009 MIGRAINE WITHOUT AURA: ICD-10-CM

## 2018-08-08 DIAGNOSIS — Z79.899 HIGH RISK MEDICATION USE: ICD-10-CM

## 2018-08-08 DIAGNOSIS — L25.9 CONTACT DERMATITIS: ICD-10-CM

## 2018-08-20 ENCOUNTER — RECORDS - HEALTHEAST (OUTPATIENT)
Dept: ADMINISTRATIVE | Facility: OTHER | Age: 58
End: 2018-08-20

## 2018-11-16 ENCOUNTER — COMMUNICATION - HEALTHEAST (OUTPATIENT)
Dept: FAMILY MEDICINE | Facility: CLINIC | Age: 58
End: 2018-11-16

## 2018-12-27 ENCOUNTER — OFFICE VISIT - HEALTHEAST (OUTPATIENT)
Dept: FAMILY MEDICINE | Facility: CLINIC | Age: 58
End: 2018-12-27

## 2018-12-27 DIAGNOSIS — Z01.818 PREOP GENERAL PHYSICAL EXAM: ICD-10-CM

## 2018-12-27 DIAGNOSIS — M21.619 BUNION: ICD-10-CM

## 2018-12-27 LAB — HGB BLD-MCNC: 14.3 G/DL (ref 12–16)

## 2018-12-27 ASSESSMENT — MIFFLIN-ST. JEOR: SCORE: 1288.34

## 2019-01-28 ENCOUNTER — RECORDS - HEALTHEAST (OUTPATIENT)
Dept: ADMINISTRATIVE | Facility: OTHER | Age: 59
End: 2019-01-28

## 2019-02-11 ENCOUNTER — RECORDS - HEALTHEAST (OUTPATIENT)
Dept: ADMINISTRATIVE | Facility: OTHER | Age: 59
End: 2019-02-11

## 2019-02-27 ENCOUNTER — RECORDS - HEALTHEAST (OUTPATIENT)
Dept: ADMINISTRATIVE | Facility: OTHER | Age: 59
End: 2019-02-27

## 2019-03-14 ENCOUNTER — OFFICE VISIT - HEALTHEAST (OUTPATIENT)
Dept: FAMILY MEDICINE | Facility: CLINIC | Age: 59
End: 2019-03-14

## 2019-03-14 DIAGNOSIS — Z79.899 HIGH RISK MEDICATION USE: ICD-10-CM

## 2019-03-14 DIAGNOSIS — Z00.00 ROUTINE GENERAL MEDICAL EXAMINATION AT A HEALTH CARE FACILITY: ICD-10-CM

## 2019-03-14 DIAGNOSIS — Z78.0 MENOPAUSE: ICD-10-CM

## 2019-03-14 DIAGNOSIS — N64.4 BREAST PAIN: ICD-10-CM

## 2019-03-14 DIAGNOSIS — D23.9 DYSPLASTIC NEVI: ICD-10-CM

## 2019-03-14 DIAGNOSIS — Z12.31 ENCOUNTER FOR SCREENING MAMMOGRAM FOR MALIGNANT NEOPLASM OF BREAST: ICD-10-CM

## 2019-03-14 DIAGNOSIS — Z12.31 VISIT FOR SCREENING MAMMOGRAM: ICD-10-CM

## 2019-03-14 DIAGNOSIS — E55.9 VITAMIN D DEFICIENCY: ICD-10-CM

## 2019-03-14 DIAGNOSIS — D12.6 ADENOMATOUS POLYP OF COLON, UNSPECIFIED PART OF COLON: ICD-10-CM

## 2019-03-14 DIAGNOSIS — E78.2 MIXED HYPERLIPIDEMIA: ICD-10-CM

## 2019-03-14 DIAGNOSIS — Z83.49 FAMILY HISTORY OF HYPOTHYROIDISM: ICD-10-CM

## 2019-03-14 DIAGNOSIS — B00.9 HERPES: ICD-10-CM

## 2019-03-14 LAB
ALBUMIN SERPL-MCNC: 4.1 G/DL (ref 3.5–5)
ALP SERPL-CCNC: 46 U/L (ref 45–120)
ALT SERPL W P-5'-P-CCNC: 11 U/L (ref 0–45)
ANION GAP SERPL CALCULATED.3IONS-SCNC: 11 MMOL/L (ref 5–18)
AST SERPL W P-5'-P-CCNC: 17 U/L (ref 0–40)
BILIRUB SERPL-MCNC: 0.6 MG/DL (ref 0–1)
BUN SERPL-MCNC: 23 MG/DL (ref 8–22)
CALCIUM SERPL-MCNC: 9.3 MG/DL (ref 8.5–10.5)
CHLORIDE BLD-SCNC: 108 MMOL/L (ref 98–107)
CHOLEST SERPL-MCNC: 210 MG/DL
CO2 SERPL-SCNC: 23 MMOL/L (ref 22–31)
CREAT SERPL-MCNC: 0.79 MG/DL (ref 0.6–1.1)
ERYTHROCYTE [DISTWIDTH] IN BLOOD BY AUTOMATED COUNT: 12 % (ref 11–14.5)
FASTING STATUS PATIENT QL REPORTED: YES
GFR SERPL CREATININE-BSD FRML MDRD: >60 ML/MIN/1.73M2
GLUCOSE BLD-MCNC: 90 MG/DL (ref 70–125)
HCT VFR BLD AUTO: 40.3 % (ref 35–47)
HDLC SERPL-MCNC: 69 MG/DL
HGB BLD-MCNC: 13.6 G/DL (ref 12–16)
LDLC SERPL CALC-MCNC: 125 MG/DL
MCH RBC QN AUTO: 31.2 PG (ref 27–34)
MCHC RBC AUTO-ENTMCNC: 33.8 G/DL (ref 32–36)
MCV RBC AUTO: 92 FL (ref 80–100)
PLATELET # BLD AUTO: 275 THOU/UL (ref 140–440)
PMV BLD AUTO: 7.9 FL (ref 7–10)
POTASSIUM BLD-SCNC: 4 MMOL/L (ref 3.5–5)
PROT SERPL-MCNC: 6.7 G/DL (ref 6–8)
RBC # BLD AUTO: 4.36 MILL/UL (ref 3.8–5.4)
SODIUM SERPL-SCNC: 142 MMOL/L (ref 136–145)
TRIGL SERPL-MCNC: 82 MG/DL
TSH SERPL DL<=0.005 MIU/L-ACNC: 2.36 UIU/ML (ref 0.3–5)
WBC: 6.8 THOU/UL (ref 4–11)

## 2019-03-14 ASSESSMENT — MIFFLIN-ST. JEOR: SCORE: 1291.74

## 2019-03-15 LAB
25(OH)D3 SERPL-MCNC: 36 NG/ML (ref 30–80)
25(OH)D3 SERPL-MCNC: 36 NG/ML (ref 30–80)

## 2019-03-25 ENCOUNTER — RECORDS - HEALTHEAST (OUTPATIENT)
Dept: ADMINISTRATIVE | Facility: OTHER | Age: 59
End: 2019-03-25

## 2019-04-10 ENCOUNTER — HOSPITAL ENCOUNTER (OUTPATIENT)
Dept: MAMMOGRAPHY | Facility: CLINIC | Age: 59
Discharge: HOME | End: 2019-04-10
Attending: FAMILY MEDICINE

## 2019-04-10 DIAGNOSIS — N64.4 BREAST PAIN: ICD-10-CM

## 2019-04-15 ENCOUNTER — HOSPITAL ENCOUNTER (OUTPATIENT)
Dept: MAMMOGRAPHY | Facility: CLINIC | Age: 59
Discharge: HOME | End: 2019-04-15
Attending: FAMILY MEDICINE

## 2019-06-03 ENCOUNTER — RECORDS - HEALTHEAST (OUTPATIENT)
Dept: ADMINISTRATIVE | Facility: OTHER | Age: 59
End: 2019-06-03

## 2019-06-25 ENCOUNTER — OFFICE VISIT - HEALTHEAST (OUTPATIENT)
Dept: FAMILY MEDICINE | Facility: CLINIC | Age: 59
End: 2019-06-25

## 2019-06-25 DIAGNOSIS — Z79.899 HIGH RISK MEDICATION USE: ICD-10-CM

## 2019-06-25 LAB
AMPHETAMINES UR QL SCN: NORMAL
BARBITURATES UR QL: NORMAL
BENZODIAZ UR QL: NORMAL
CANNABINOIDS UR QL SCN: NORMAL
COCAINE UR QL: NORMAL
CREAT UR-MCNC: 97.1 MG/DL
METHADONE UR QL SCN: NORMAL
OPIATES UR QL SCN: NORMAL
OXYCODONE UR QL: NORMAL
PCP UR QL SCN: NORMAL

## 2019-06-25 ASSESSMENT — MIFFLIN-ST. JEOR: SCORE: 1295.37

## 2019-07-31 ENCOUNTER — COMMUNICATION - HEALTHEAST (OUTPATIENT)
Dept: FAMILY MEDICINE | Facility: CLINIC | Age: 59
End: 2019-07-31

## 2019-07-31 DIAGNOSIS — Z79.899 HIGH RISK MEDICATION USE: ICD-10-CM

## 2019-08-01 ENCOUNTER — COMMUNICATION - HEALTHEAST (OUTPATIENT)
Dept: FAMILY MEDICINE | Facility: CLINIC | Age: 59
End: 2019-08-01

## 2019-08-01 DIAGNOSIS — Z79.899 HIGH RISK MEDICATION USE: ICD-10-CM

## 2019-09-25 ENCOUNTER — OFFICE VISIT - HEALTHEAST (OUTPATIENT)
Dept: FAMILY MEDICINE | Facility: CLINIC | Age: 59
End: 2019-09-25

## 2019-09-25 DIAGNOSIS — Z79.899 HIGH RISK MEDICATION USE: ICD-10-CM

## 2019-09-25 DIAGNOSIS — Z79.899 CONTROLLED SUBSTANCE AGREEMENT SIGNED: ICD-10-CM

## 2019-12-26 ENCOUNTER — OFFICE VISIT - HEALTHEAST (OUTPATIENT)
Dept: FAMILY MEDICINE | Facility: CLINIC | Age: 59
End: 2019-12-26

## 2019-12-26 DIAGNOSIS — R20.9 BILATERAL COLD FEET: ICD-10-CM

## 2019-12-26 DIAGNOSIS — Z79.899 HIGH RISK MEDICATION USE: ICD-10-CM

## 2019-12-26 DIAGNOSIS — G43.009 MIGRAINE WITHOUT AURA AND WITHOUT STATUS MIGRAINOSUS, NOT INTRACTABLE: ICD-10-CM

## 2019-12-26 DIAGNOSIS — I10 BENIGN ESSENTIAL HYPERTENSION: ICD-10-CM

## 2019-12-27 ENCOUNTER — AMBULATORY - HEALTHEAST (OUTPATIENT)
Dept: VASCULAR SURGERY | Facility: CLINIC | Age: 59
End: 2019-12-27

## 2019-12-27 DIAGNOSIS — I73.9 CLAUDICATION (H): ICD-10-CM

## 2020-01-09 ENCOUNTER — AMBULATORY - HEALTHEAST (OUTPATIENT)
Dept: NURSING | Facility: CLINIC | Age: 60
End: 2020-01-09

## 2020-01-09 DIAGNOSIS — R03.0 ELEVATED BLOOD-PRESSURE READING WITHOUT DIAGNOSIS OF HYPERTENSION: ICD-10-CM

## 2020-01-10 ENCOUNTER — COMMUNICATION - HEALTHEAST (OUTPATIENT)
Dept: FAMILY MEDICINE | Facility: CLINIC | Age: 60
End: 2020-01-10

## 2020-01-27 ENCOUNTER — OFFICE VISIT - HEALTHEAST (OUTPATIENT)
Dept: VASCULAR SURGERY | Facility: CLINIC | Age: 60
End: 2020-01-27

## 2020-01-27 ENCOUNTER — AMBULATORY - HEALTHEAST (OUTPATIENT)
Dept: VASCULAR SURGERY | Facility: CLINIC | Age: 60
End: 2020-01-27

## 2020-01-27 ENCOUNTER — RECORDS - HEALTHEAST (OUTPATIENT)
Dept: VASCULAR ULTRASOUND | Facility: CLINIC | Age: 60
End: 2020-01-27

## 2020-01-27 DIAGNOSIS — M48.061 SPINAL STENOSIS AT L4-L5 LEVEL: ICD-10-CM

## 2020-01-27 DIAGNOSIS — I73.9 PERIPHERAL VASCULAR DISEASE, UNSPECIFIED (H): ICD-10-CM

## 2020-01-27 DIAGNOSIS — R20.9 BILATERAL COLD FEET: ICD-10-CM

## 2020-01-27 LAB
FOLATE SERPL-MCNC: 18.9 NG/ML
VIT B12 SERPL-MCNC: 505 PG/ML (ref 213–816)

## 2020-01-27 ASSESSMENT — MIFFLIN-ST. JEOR: SCORE: 1300.24

## 2020-01-29 ENCOUNTER — RECORDS - HEALTHEAST (OUTPATIENT)
Dept: ADMINISTRATIVE | Facility: OTHER | Age: 60
End: 2020-01-29

## 2020-02-24 ENCOUNTER — OFFICE VISIT - HEALTHEAST (OUTPATIENT)
Dept: VASCULAR SURGERY | Facility: CLINIC | Age: 60
End: 2020-02-24

## 2020-02-24 DIAGNOSIS — M54.9 BACK PAIN: ICD-10-CM

## 2020-03-15 ENCOUNTER — COMMUNICATION - HEALTHEAST (OUTPATIENT)
Dept: FAMILY MEDICINE | Facility: CLINIC | Age: 60
End: 2020-03-15

## 2020-03-15 DIAGNOSIS — G43.009 MIGRAINE WITHOUT AURA AND WITHOUT STATUS MIGRAINOSUS, NOT INTRACTABLE: ICD-10-CM

## 2020-04-28 ENCOUNTER — AMBULATORY - HEALTHEAST (OUTPATIENT)
Dept: FAMILY MEDICINE | Facility: CLINIC | Age: 60
End: 2020-04-28

## 2020-04-28 ENCOUNTER — OFFICE VISIT - HEALTHEAST (OUTPATIENT)
Dept: FAMILY MEDICINE | Facility: CLINIC | Age: 60
End: 2020-04-28

## 2020-04-28 ENCOUNTER — COMMUNICATION - HEALTHEAST (OUTPATIENT)
Dept: FAMILY MEDICINE | Facility: CLINIC | Age: 60
End: 2020-04-28

## 2020-04-28 DIAGNOSIS — Z79.899 HIGH RISK MEDICATION USE: ICD-10-CM

## 2020-04-28 DIAGNOSIS — B00.9 HERPES: ICD-10-CM

## 2020-04-28 DIAGNOSIS — G43.009 MIGRAINE WITHOUT AURA AND WITHOUT STATUS MIGRAINOSUS, NOT INTRACTABLE: ICD-10-CM

## 2020-04-28 ASSESSMENT — PATIENT HEALTH QUESTIONNAIRE - PHQ9: SUM OF ALL RESPONSES TO PHQ QUESTIONS 1-9: 1

## 2020-05-04 ENCOUNTER — AMBULATORY - HEALTHEAST (OUTPATIENT)
Dept: FAMILY MEDICINE | Facility: CLINIC | Age: 60
End: 2020-05-04

## 2020-05-04 DIAGNOSIS — Z79.899 HIGH RISK MEDICATION USE: ICD-10-CM

## 2020-09-02 ENCOUNTER — OFFICE VISIT - HEALTHEAST (OUTPATIENT)
Dept: FAMILY MEDICINE | Facility: CLINIC | Age: 60
End: 2020-09-02

## 2020-09-02 DIAGNOSIS — D23.9 DYSPLASTIC NEVI: ICD-10-CM

## 2020-09-02 DIAGNOSIS — E55.9 VITAMIN D DEFICIENCY: ICD-10-CM

## 2020-09-02 DIAGNOSIS — Z00.00 ROUTINE GENERAL MEDICAL EXAMINATION AT A HEALTH CARE FACILITY: ICD-10-CM

## 2020-09-02 DIAGNOSIS — E78.2 MIXED HYPERLIPIDEMIA: ICD-10-CM

## 2020-09-02 DIAGNOSIS — Z78.0 MENOPAUSE: ICD-10-CM

## 2020-09-02 DIAGNOSIS — I10 BENIGN ESSENTIAL HYPERTENSION: ICD-10-CM

## 2020-09-02 DIAGNOSIS — Z83.49 FAMILY HISTORY OF HYPOTHYROIDISM: ICD-10-CM

## 2020-09-02 DIAGNOSIS — Z12.31 VISIT FOR SCREENING MAMMOGRAM: ICD-10-CM

## 2020-09-02 LAB
ALBUMIN SERPL-MCNC: 3.8 G/DL (ref 3.5–5)
ALP SERPL-CCNC: 49 U/L (ref 45–120)
ALT SERPL W P-5'-P-CCNC: 12 U/L (ref 0–45)
ANION GAP SERPL CALCULATED.3IONS-SCNC: 14 MMOL/L (ref 5–18)
AST SERPL W P-5'-P-CCNC: 18 U/L (ref 0–40)
BILIRUB SERPL-MCNC: 0.9 MG/DL (ref 0–1)
BUN SERPL-MCNC: 28 MG/DL (ref 8–22)
CALCIUM SERPL-MCNC: 9.5 MG/DL (ref 8.5–10.5)
CHLORIDE BLD-SCNC: 107 MMOL/L (ref 98–107)
CHOLEST SERPL-MCNC: 231 MG/DL
CO2 SERPL-SCNC: 21 MMOL/L (ref 22–31)
CREAT SERPL-MCNC: 0.81 MG/DL (ref 0.6–1.1)
ERYTHROCYTE [DISTWIDTH] IN BLOOD BY AUTOMATED COUNT: 11.4 % (ref 11–14.5)
FASTING STATUS PATIENT QL REPORTED: YES
GFR SERPL CREATININE-BSD FRML MDRD: >60 ML/MIN/1.73M2
GLUCOSE BLD-MCNC: 94 MG/DL (ref 70–125)
HCT VFR BLD AUTO: 42.8 % (ref 35–47)
HDLC SERPL-MCNC: 70 MG/DL
HGB BLD-MCNC: 14.4 G/DL (ref 12–16)
HIV 1+2 AB+HIV1 P24 AG SERPL QL IA: NEGATIVE
LDLC SERPL CALC-MCNC: 142 MG/DL
MCH RBC QN AUTO: 31.3 PG (ref 27–34)
MCHC RBC AUTO-ENTMCNC: 33.7 G/DL (ref 32–36)
MCV RBC AUTO: 93 FL (ref 80–100)
PLATELET # BLD AUTO: 295 THOU/UL (ref 140–440)
PMV BLD AUTO: 7.8 FL (ref 7–10)
POTASSIUM BLD-SCNC: 4 MMOL/L (ref 3.5–5)
PROT SERPL-MCNC: 6.6 G/DL (ref 6–8)
RBC # BLD AUTO: 4.61 MILL/UL (ref 3.8–5.4)
SODIUM SERPL-SCNC: 142 MMOL/L (ref 136–145)
TRIGL SERPL-MCNC: 96 MG/DL
TSH SERPL DL<=0.005 MIU/L-ACNC: 2.23 UIU/ML (ref 0.3–5)
WBC: 6.9 THOU/UL (ref 4–11)

## 2020-09-02 ASSESSMENT — MIFFLIN-ST. JEOR: SCORE: 1294.01

## 2020-09-03 LAB
25(OH)D3 SERPL-MCNC: 45.7 NG/ML (ref 30–80)
25(OH)D3 SERPL-MCNC: 45.7 NG/ML (ref 30–80)
HCV AB SERPL QL IA: NEGATIVE
HPV SOURCE: NORMAL
HUMAN PAPILLOMA VIRUS 16 DNA: NEGATIVE
HUMAN PAPILLOMA VIRUS 18 DNA: NEGATIVE
HUMAN PAPILLOMA VIRUS FINAL DIAGNOSIS: NORMAL
HUMAN PAPILLOMA VIRUS OTHER HR: NEGATIVE
SPECIMEN DESCRIPTION: NORMAL

## 2020-09-14 LAB
BKR LAB AP ABNORMAL BLEEDING: NO
BKR LAB AP BIRTH CONTROL/HORMONES: NORMAL
BKR LAB AP CERVICAL APPEARANCE: NORMAL
BKR LAB AP GYN ADEQUACY: NORMAL
BKR LAB AP GYN INTERPRETATION: NORMAL
BKR LAB AP HPV REFLEX: NORMAL
BKR LAB AP LMP: NORMAL
BKR LAB AP PATIENT STATUS: NORMAL
BKR LAB AP PREVIOUS ABNORMAL: NO
BKR LAB AP PREVIOUS NORMAL: NORMAL
HIGH RISK?: NO
PATH REPORT.COMMENTS IMP SPEC: NORMAL
RESULT FLAG (HE HISTORICAL CONVERSION): NORMAL

## 2021-03-29 ENCOUNTER — OFFICE VISIT - HEALTHEAST (OUTPATIENT)
Dept: FAMILY MEDICINE | Facility: CLINIC | Age: 61
End: 2021-03-29

## 2021-03-29 DIAGNOSIS — Z79.899 HIGH RISK MEDICATION USE: ICD-10-CM

## 2021-03-29 DIAGNOSIS — D23.9 DYSPLASTIC NEVI: ICD-10-CM

## 2021-03-29 DIAGNOSIS — D12.6 ADENOMATOUS POLYP OF COLON, UNSPECIFIED PART OF COLON: ICD-10-CM

## 2021-03-29 DIAGNOSIS — I10 BENIGN ESSENTIAL HYPERTENSION: ICD-10-CM

## 2021-03-29 DIAGNOSIS — Z80.0 FAMILY HISTORY OF COLON CANCER: ICD-10-CM

## 2021-03-29 DIAGNOSIS — G43.009 MIGRAINE WITHOUT AURA AND WITHOUT STATUS MIGRAINOSUS, NOT INTRACTABLE: ICD-10-CM

## 2021-03-29 DIAGNOSIS — Z79.899 CONTROLLED SUBSTANCE AGREEMENT SIGNED: ICD-10-CM

## 2021-03-29 DIAGNOSIS — B00.9 HERPES: ICD-10-CM

## 2021-03-29 DIAGNOSIS — Z12.31 VISIT FOR SCREENING MAMMOGRAM: ICD-10-CM

## 2021-03-29 LAB
AMPHETAMINES UR QL SCN: NORMAL
BARBITURATES UR QL: NORMAL
BENZODIAZ UR QL: NORMAL
CANNABINOIDS UR QL SCN: NORMAL
COCAINE UR QL: NORMAL
CREAT UR-MCNC: 44.9 MG/DL
METHADONE UR QL SCN: NORMAL
OPIATES UR QL SCN: NORMAL
OXYCODONE UR QL: NORMAL
PCP UR QL SCN: NORMAL
POTASSIUM BLD-SCNC: 3.9 MMOL/L (ref 3.5–5)

## 2021-03-31 ENCOUNTER — COMMUNICATION - HEALTHEAST (OUTPATIENT)
Dept: FAMILY MEDICINE | Facility: CLINIC | Age: 61
End: 2021-03-31

## 2021-03-31 DIAGNOSIS — B00.89 HERPES DERMATITIS: ICD-10-CM

## 2021-04-06 ENCOUNTER — COMMUNICATION - HEALTHEAST (OUTPATIENT)
Dept: LAB | Facility: CLINIC | Age: 61
End: 2021-04-06

## 2021-04-07 ENCOUNTER — AMBULATORY - HEALTHEAST (OUTPATIENT)
Dept: FAMILY MEDICINE | Facility: CLINIC | Age: 61
End: 2021-04-07

## 2021-04-07 DIAGNOSIS — I10 BENIGN ESSENTIAL HYPERTENSION: ICD-10-CM

## 2021-04-13 ENCOUNTER — AMBULATORY - HEALTHEAST (OUTPATIENT)
Dept: NURSING | Facility: CLINIC | Age: 61
End: 2021-04-13

## 2021-04-13 ENCOUNTER — COMMUNICATION - HEALTHEAST (OUTPATIENT)
Dept: FAMILY MEDICINE | Facility: CLINIC | Age: 61
End: 2021-04-13

## 2021-04-13 ENCOUNTER — AMBULATORY - HEALTHEAST (OUTPATIENT)
Dept: LAB | Facility: CLINIC | Age: 61
End: 2021-04-13

## 2021-04-13 DIAGNOSIS — I10 BENIGN ESSENTIAL HYPERTENSION: ICD-10-CM

## 2021-04-13 LAB — POTASSIUM BLD-SCNC: 4.7 MMOL/L (ref 3.5–5)

## 2021-04-23 ENCOUNTER — COMMUNICATION - HEALTHEAST (OUTPATIENT)
Dept: FAMILY MEDICINE | Facility: CLINIC | Age: 61
End: 2021-04-23

## 2021-04-23 DIAGNOSIS — I10 BENIGN ESSENTIAL HYPERTENSION: ICD-10-CM

## 2021-05-25 ENCOUNTER — RECORDS - HEALTHEAST (OUTPATIENT)
Dept: ADMINISTRATIVE | Facility: CLINIC | Age: 61
End: 2021-05-25

## 2021-05-26 VITALS — HEART RATE: 84 BPM | SYSTOLIC BLOOD PRESSURE: 132 MMHG | DIASTOLIC BLOOD PRESSURE: 82 MMHG

## 2021-05-26 VITALS
HEART RATE: 84 BPM | SYSTOLIC BLOOD PRESSURE: 150 MMHG | TEMPERATURE: 98.4 F | RESPIRATION RATE: 18 BRPM | DIASTOLIC BLOOD PRESSURE: 90 MMHG

## 2021-05-27 VITALS — DIASTOLIC BLOOD PRESSURE: 82 MMHG | HEART RATE: 74 BPM | SYSTOLIC BLOOD PRESSURE: 120 MMHG

## 2021-05-27 ASSESSMENT — PATIENT HEALTH QUESTIONNAIRE - PHQ9: SUM OF ALL RESPONSES TO PHQ QUESTIONS 1-9: 1

## 2021-05-30 VITALS — WEIGHT: 181 LBS | HEIGHT: 63 IN | BODY MASS INDEX: 32.07 KG/M2

## 2021-05-30 VITALS — BODY MASS INDEX: 31.89 KG/M2 | HEIGHT: 63 IN | WEIGHT: 180 LBS

## 2021-05-30 VITALS — BODY MASS INDEX: 31.68 KG/M2 | WEIGHT: 177.44 LBS

## 2021-05-30 VITALS — BODY MASS INDEX: 31.65 KG/M2 | WEIGHT: 177.25 LBS

## 2021-05-30 VITALS — WEIGHT: 180 LBS | BODY MASS INDEX: 31.89 KG/M2 | HEIGHT: 63 IN

## 2021-05-30 NOTE — PROGRESS NOTES
"Assessment: Medication check for Phentermine    Plan: The following high BMI interventions were performed this visit: encouragement to exercise, weight monitoring and dietary management education, guidance, and counseling  Stop med and mian COOPER if any chest pain, palpations or shortness of breath. Follow-up in 3 month.  Phentermine dose is 37.5 mg.    Chief Complaint   Patient presents with     Medication Management     refill for phentermine      Subjective: Carole Davidson comes in today for a medication check for Phentermine. No chest pain, palpations or shortness of breath. They have been on the med for  3 months. They have lost 0 pounds in the last month and 10 pounds overall. They exercise 30 minutes 3 days a week. They are not journaling calories. Patient has not been as compliant with diet la jian, plans to get back on track.      Objective: BP (!) 145/96 (Patient Site: Left Arm, Patient Position: Sitting, Cuff Size: Adult Large)   Pulse 87   Temp 97.3  F (36.3  C) (Oral)   Ht 5' 2.75\" (1.594 m)   Wt 169 lb 12.8 oz (77 kg)   BMI 30.32 kg/m    General: No apparent distress  CV: Regular rate and rhythm without murmur  Lungs: Clear to auscultation bilaterally    "

## 2021-05-31 VITALS — HEIGHT: 63 IN | WEIGHT: 164 LBS | BODY MASS INDEX: 29.06 KG/M2

## 2021-05-31 VITALS — BODY MASS INDEX: 30.89 KG/M2 | WEIGHT: 173 LBS

## 2021-05-31 VITALS — BODY MASS INDEX: 30.25 KG/M2 | WEIGHT: 169.44 LBS

## 2021-05-31 VITALS — WEIGHT: 165 LBS | BODY MASS INDEX: 29.46 KG/M2

## 2021-05-31 VITALS — BODY MASS INDEX: 29.16 KG/M2 | HEIGHT: 63 IN | WEIGHT: 164.56 LBS

## 2021-05-31 NOTE — TELEPHONE ENCOUNTER
Controlled Substance Refill Request  Medication:   Requested Prescriptions     Pending Prescriptions Disp Refills     phentermine (ADIPEX-P) 37.5 mg tablet [Pharmacy Med Name: PHENTERMINE 37.5MG TABLETS] 30 tablet 0     Sig: TAKE 1 TABLET(37.5 MG) BY MOUTH DAILY BEFORE BREAKFAST     Date Last Fill: 6/25/19  Pharmacy: [id number]   Submit electronically to pharmacy  Controlled Substance Agreement on File:   Encounter-Level CSA Scan Date:    There are no encounter-level csa scan date.       Last office visit: Last office visit pertaining to requested medication was 6/25/19.

## 2021-05-31 NOTE — TELEPHONE ENCOUNTER
Medication Question or Clarification  Who is calling: Pharmacy: [facility]  What medication are you calling about? (include dose and sig)   phentermine (ADIPEX-P) 37.5 mg tablet 30 tablet 0 7/31/2019     Sig: TAKE 1 TABLET(37.5 MG) BY MOUTH DAILY BEFORE BREAKFAST    Sent to pharmacy as: phentermine (ADIPEX-P) 37.5 mg tablet        Who prescribed the medication?: Dr Lockhart  What is your question/concern?: Fax request received from pharmacy states: Drug not covered by patient plan. The preferred alternative is Phentermine cap mg. Please call/fax the pharmacy to change medication along with strength, directions, quantity, and refills.  Pharmacy: [facility]  Okay to leave a detailed message?: Yes  Site CMT - Please call the pharmacy to obtain any additional needed information.

## 2021-06-01 VITALS — BODY MASS INDEX: 28.72 KG/M2 | WEIGHT: 162.13 LBS

## 2021-06-01 VITALS — BODY MASS INDEX: 28.61 KG/M2 | WEIGHT: 161.5 LBS

## 2021-06-01 NOTE — PROGRESS NOTES
Assessment: Medication check for Phentermine    1. Controlled substance agreement signed-Phentermine 37.5 mg, max OF 30 PER 30 DAYS, CSA AND URINE TOX DONE JUNE 2019     2. High risk medication use  phentermine 37.5 MG capsule        Plan: The following high BMI interventions were performed this visit: encouragement to exercise, weight monitoring and lifestyle education regarding diet  Stop med and mian COOPER if any chest pain, palpations or shortness of breath. Follow-up in 3 months. This is month #7. Phentermine dose is 37.5 mg. Blood pressure will be monitored closely. Medication contract and urine tox screen will both be due June 2020.    Continue phentermine 37.5 mg capsule daily. This will be month #7. Prescription was renewed for #30 capsules for 30 days with 2 refills given. Stop use of medication if there is onset of chest pain, palpitations, or shortness of breath.    Work on healthy diet without excessive snacking or alcohol use. Aim for 35-45 minutes of physical activity every day.     Blood pressure will be monitored closely.    Medication contract and urine tox screen will both be due June 2020.    Follow up in 3 months for medication check or sooner as needed.     Prescription monitoring program reviewed and patient following the controlled substance agreement.       Chief Complaint   Patient presents with     Medication Management     Medication Refill      Subjective: Carole NEVILLE Lety comes in today for a medication check for Phentermine. No chest pain, palpations or shortness of breath. She has been on the med for six months. The patient has lost 0 pounds in the last three months and 14 pounds overall. She has not been adhering to healthy diet this summer, and snacked quite a bit and drank beer when they were up at their cabin this summer. Her calories vary day by day. Carole exercising two days a week, more so this past summer when she was walking a half hour every day. Her mother is now in  [place] so she has not been walking as much. The patient questions whether there is a difference in effectiveness in capsule versus tablet form of phentermine. She has had the capsules only once in the past six months.    Her blood pressure is more elevated today. The patient has never struggled with elevated blood pressure readings before, and they have been more elevated since starting phentermine  Her family history is significant for mother with hypertension.     Objective: /90 (Patient Site: Left Arm, Patient Position: Sitting, Cuff Size: Adult Regular)   Pulse 79   Resp 16   Wt 169 lb 4 oz (76.8 kg)   BMI 30.22 kg/m     BP recheck: 132/90    Physical Examination:  General: No apparent distress  CV: Regular rate and rhythm without murmur  Lungs: Clear to auscultation bilaterally    20 minutes spent with this patient greater than 50% discussion regarding above issues.    I, Sofia Gray, am scribing for and in the presence of, Dr. Lockhart.    I, Dr. Minerva Lockhart MD, personally performed the services described in this documentation, as scribed by Sofia Gray in my presence, and it is both accurate and complete.

## 2021-06-01 NOTE — PATIENT INSTRUCTIONS - HE
Continue phentermine 37.5 mg capsule daily. This will be month #7. Prescription was renewed for #30 capsules for 30 days with 2 refills given. Stop use of medication if there is onset of chest pain, palpitations, or shortness of breath.    Work on healthy diet without excessive snacking or alcohol use. Aim for 35-45 minutes of physical activity every day.     Blood pressure will be monitored closely.    Medication contract and urine tox screen will both be due June 2020.    Follow up in 3 months for medication check or sooner as needed.

## 2021-06-02 VITALS — BODY MASS INDEX: 29.66 KG/M2 | HEIGHT: 63 IN | WEIGHT: 167.38 LBS

## 2021-06-02 VITALS — BODY MASS INDEX: 29.95 KG/M2 | WEIGHT: 169 LBS | HEIGHT: 63 IN

## 2021-06-03 VITALS — BODY MASS INDEX: 30.09 KG/M2 | HEIGHT: 63 IN | WEIGHT: 169.8 LBS

## 2021-06-03 VITALS
BODY MASS INDEX: 30.22 KG/M2 | HEART RATE: 79 BPM | RESPIRATION RATE: 16 BRPM | WEIGHT: 169.25 LBS | SYSTOLIC BLOOD PRESSURE: 135 MMHG | DIASTOLIC BLOOD PRESSURE: 90 MMHG

## 2021-06-03 VITALS
DIASTOLIC BLOOD PRESSURE: 90 MMHG | WEIGHT: 172.4 LBS | TEMPERATURE: 96.8 F | HEART RATE: 78 BPM | RESPIRATION RATE: 24 BRPM | SYSTOLIC BLOOD PRESSURE: 138 MMHG | BODY MASS INDEX: 30.78 KG/M2

## 2021-06-04 VITALS
RESPIRATION RATE: 18 BRPM | WEIGHT: 170 LBS | TEMPERATURE: 98.4 F | HEART RATE: 96 BPM | DIASTOLIC BLOOD PRESSURE: 94 MMHG | SYSTOLIC BLOOD PRESSURE: 128 MMHG | BODY MASS INDEX: 30.12 KG/M2 | HEIGHT: 63 IN

## 2021-06-04 VITALS
DIASTOLIC BLOOD PRESSURE: 87 MMHG | WEIGHT: 169.5 LBS | SYSTOLIC BLOOD PRESSURE: 131 MMHG | RESPIRATION RATE: 16 BRPM | TEMPERATURE: 97.3 F | HEART RATE: 82 BPM | BODY MASS INDEX: 30.03 KG/M2 | HEIGHT: 63 IN

## 2021-06-04 NOTE — PATIENT INSTRUCTIONS - HE
Goal will be to increase exercise 3-4 times a week.    Buying a treadmill for home.    Physical set up for April.    A vascular consult for leg symptoms of color, temperature change, numbness, and tingling.    Refill on Phentermine 37.5 mg.    Starting hydrochlorothiazide 12.5 mg capsule for mildly blood pressure, if getting light headed the blood pressure might be dropping too low. Have a BP check.    Set nurse appointment in 2 weeks for blood pressure check.     Refilled the Zomig.

## 2021-06-04 NOTE — PROGRESS NOTES
Assessment:   1. Migraine without aura and without status migrainosus, not intractable  ZOLMitriptan (ZOMIG) 2.5 MG tablet   2. Bilateral cold feet  Ambulatory referral to Vascular Medicine   3. Benign essential hypertension  hydroCHLOROthiazide (MICROZIDE) 12.5 mg capsule   4. High risk medication use  phentermine 37.5 MG capsule       Plan:     Goal will be to increase exercise 3-4 times a week.    Buying a treadmill for home.    Physical set up for April.    A vascular consult for leg symptoms of color, temperature change, numbness, and tingling.    Refill on Phentermine 37.5 mg. Month 10, 11, and 12.    Starting hydrochlorothiazide 12.5 mg capsule for mildly blood pressure, if getting light headed the blood pressure might be dropping too low. Have a BP check.    Set nurse appointment in 2 weeks for blood pressure check.     Refilled the Zomig for migraines.      Subjective:  Chief Complaint   Patient presents with     Medication Management     Currently taking Phentermine 37.5 mg daily CSA done 6/27/19      Carole is a 59 y.o. female who has come in clinic today for a office visit for a medication management for phentermine. She was last seen in clinic 09/25/2019 for an office visit.    Medication: She denies any high blood pressure problems and states that last visit was told by Dr. Lockhart that it may have been due to the phentermine. The patient reports having more headaches recently, but medication is helping.     Weight: She reports gaining some weight and 184 being her max weight. She says that it is due to the recent holidays and also not exercising daily. The patient tries to exercise 2-3 times a week, but her gym has closed down so now she is buying weights and a treadmill to exercise at home.     Feet: She reports that when she is sitting while doing makeup her feet turn purple. She states that sometimes it is very cold or hot. Also, she feels numbness and tingling. Her problem has been ongoing for 11  months. Additionally, she has had a bunion surgery on both feet. The patient feels that the surgery has not gone well, as this symptom occurred only after the surgery. She denies any pain, but does get cramping. Also, she says she has to wear spacers to help with her toes.     PFSH:  She quit smoking in 1986.    Current Outpatient Medications   Medication Sig     cholecalciferol, vitamin D3, 400 unit Chew Chew.     loratadine (CLARITIN) 10 mg tablet Take 10 mg by mouth daily.     multivitamin therapeutic tablet Take 1 tablet by mouth daily.     phentermine 37.5 MG capsule Take 1 capsule (37.5 mg total) by mouth every morning.     potassium 99 mg Tab Take by mouth.     valACYclovir (VALTREX) 500 MG tablet TAKE 1 TABLET BY MOUTH TWICE DAILY UP TO 4-5 DAYS AS NEEDED     ZOLMitriptan (ZOMIG) 2.5 MG tablet Take 1 tab as needed for migraine, may repeat in 2 hours     hydroCHLOROthiazide (MICROZIDE) 12.5 mg capsule Take 1 capsule (12.5 mg total) by mouth daily.       Patient Active Problem List   Diagnosis     Migraine without aura     Herpes dermatitis     Vitamin D deficiency     Nocturia     Adenomatous colon polyp     Dysplastic nevi     Hyperlipidemia     Urinary frequency     Snoring     Post-menopause     High risk medication use     History of adenomatous polyp of colon     Family history of hypothyroidism     Controlled substance agreement signed-Phentermine 37.5 mg, max OF 30 PER 30 DAYS, CSA AND URINE TOX DONE JUNE 2019       Objective:  /90 (Patient Site: Left Arm, Patient Position: Sitting, Cuff Size: Adult Large)   Pulse 78   Temp 96.8  F (36  C) (Oral)   Resp 24   Wt 172 lb 6.4 oz (78.2 kg)   BMI 30.78 kg/m    General: No apparent distress  Cardiovascular: Regular rate and rhythm without murmurs, rubs, or gallops  Lungs: Clear to auscultation bilaterally, no wheezes, crackles, or rhonchi  Feet: feels cool to touch, has normal sensation, good pulses.    ADDITIONAL HISTORY SUMMARIZED (2):  None.  DECISION TO OBTAIN EXTRA INFORMATION (1): None.   RADIOLOGY TESTS (1): None.  LABS (1): 03/14/2019 labs reviewed.  MEDICINE TESTS (1): None.  INDEPENDENT REVIEW (2 each): None.     The visit lasted a total of 26 minutes face to face with the patient. Over 50% of the time was spent counseling and educating the patient about medication.    I, Mackenzie Acevedo am scribing for and in the presence of, Dr. Lockhart.    I, Dr. Lockhart, personally performed the services described in this documentation, as scribed by Mackenzie Acevedo in my presence, and it is both accurate and complete.    Total data points: 1

## 2021-06-05 VITALS
TEMPERATURE: 97 F | SYSTOLIC BLOOD PRESSURE: 133 MMHG | RESPIRATION RATE: 16 BRPM | DIASTOLIC BLOOD PRESSURE: 96 MMHG | HEART RATE: 72 BPM | BODY MASS INDEX: 31.87 KG/M2 | WEIGHT: 178.5 LBS

## 2021-06-05 NOTE — TELEPHONE ENCOUNTER
Left message to call back for: Bp check  Information to relay to patient:  Left detailed message stating message below from .           Minerva Lockhart MD at 1/9/2020  6:13 PM     Status: Signed      BP looks good, continue same meds and follow-up twice yearly, one for med check and one for physical.  Since the blood pressure check note states that if the blood pressure is normal to continue same medications and if this was already communicated to the patient no callback needed.

## 2021-06-05 NOTE — TELEPHONE ENCOUNTER
----- Message from Minerva Lockhart MD sent at 1/9/2020  6:13 PM CST -----      ----- Message -----  From: Gaye Curiel [facility]  Sent: 1/9/2020   9:03 AM CST  To: Minerva Lockhart MD

## 2021-06-05 NOTE — PROGRESS NOTES
Follow Up Blood Pressure Check    Carole Davidson is a 59 y.o. female recommended to follow up for blood pressure check by Minerva Lockhart MD. Anihypertensive medications and adherence were verified: Yes.     Reason for visit: Patient was pcp 12/26 and bp was elevated, advised to start HCTZ 12.5mg and return for a bp check in 2 weeks.     Medication change at last visit: Started HCTZ 12.5mg    Today's Vitals:   Vitals:    01/09/20 0857   BP: 132/82   Patient Site: Left Arm   Patient Position: Sitting   Cuff Size: Adult Regular   Pulse: 84       Home blood pressure readings brought in today:   no    Lowest blood pressure today is less than 140/90 and they deny signs or symptoms of new onset: severe headache, fatigue, confusion, vision changes, chest pain, pounding in the chest, neck, ears, irregular heartbeat, difficulty breathing and blood in the urine.  Please inform patient of his/her blood pressure today.  If they are asymptomatic, the patient is to continue current medications.  This message will be routed to their provider, and they will be notified if a change in medication is recommended.    ( may be deleted if not applicable) If lowest blood pressure is greater than 200/110, regardless if symptoms are present, patient needs to be evaluated by a provider today.    Gaye Curiel    Current Outpatient Medications   Medication Sig Dispense Refill     cholecalciferol, vitamin D3, 400 unit Chew Chew.       hydroCHLOROthiazide (MICROZIDE) 12.5 mg capsule Take 1 capsule (12.5 mg total) by mouth daily. 90 capsule 3     loratadine (CLARITIN) 10 mg tablet Take 10 mg by mouth daily.       multivitamin therapeutic tablet Take 1 tablet by mouth daily.       phentermine 37.5 MG capsule Take 1 capsule (37.5 mg total) by mouth every morning. 30 capsule 2     potassium 99 mg Tab Take by mouth.       valACYclovir (VALTREX) 500 MG tablet TAKE 1 TABLET BY MOUTH TWICE DAILY UP TO 4-5 DAYS AS NEEDED 30 tablet 2      ZOLMitriptan (ZOMIG) 2.5 MG tablet Take 1 tab as needed for migraine, may repeat in 2 hours 12 tablet 5     No current facility-administered medications for this visit.

## 2021-06-05 NOTE — PROGRESS NOTES
Patient is in clinic today to see MD Francisco Tavares.      Patient is here for a consult to discuss  Bilateral cold feet. Minerva Lockhart MD referring;    The patient does not smoke.    The patient states they are NOT wearing compression. She states she does wear only when she flies on an airplane.     Pt rates pain 3/10 located at bilateral feet. Currently feet are numb and tingling up to mid calf. Had bunion and hammer toe surgeries on both feet 1yr ago.     The provider has been notified of the above info.     At the end of the visit the patient was provided with education both verbally and on their AVS.

## 2021-06-05 NOTE — PROGRESS NOTES
BP looks good, continue same meds and follow-up twice yearly, one for med check and one for physical.  Since the blood pressure check note states that if the blood pressure is normal to continue same medications and if this was already communicated to the patient no callback needed.

## 2021-06-05 NOTE — PROGRESS NOTES
Vascular Medicine Progress note    Dr Francisco Tavares MD, Vascular Medicine      [facility]    Medical Record #:  [id number]    Date of Service: 01/27/2020     Date last seen:  Visit date not found    PRIMARY CARE PROVIDER: Minerva Lockhart MD      IMPRESSION/PLAN: Bilateral cold feet sensation with discoloration on leg dependency, patient is on no hormonal treatment, patient does not smoke, patient is having continuous pain affecting the feet and its mainly on the dorsum of the feet and when she walks gets worse if she sitting it is a little bit better but it is continuous all the time currently the feet are numb and tingly up to the midcalf patient had bunions and hammertoe surgeries on both feet 1 year ago and since that time she has been having pain and the pain is mainly radiating to both calves  Patient denies any history of claudication, no history of chest pain shortness of breath or palpitations  Patient had an SUKUMAR resting and post exercise done today with toe pressures the result looked completely normal with normal toe pressures and normal tracings  Patient has palpable DP and PT pulses bilaterally in addition the feet were not cold with normal capillary refill time    DISPOSITION:  1- CT lumbosacral spine to rule out any mechanical cause of radiculopathy affecting L5-S1 dermatomes  2- continue with compression treatment  3- check vitamin B12 and folic acid levels  4-we will see the patient once test results are available      ______________________________________________________________________    Subjective:    ALLERGIES:  Dog dander; Dust [other environmental allergy]; Mold extracts; and Topamax [topiramate]    MEDS:    Current Outpatient Medications:      cholecalciferol, vitamin D3, 400 unit Chew, Chew., Disp: , Rfl:      hydroCHLOROthiazide (MICROZIDE) 12.5 mg capsule, Take 1 capsule (12.5 mg total) by mouth daily., Disp: 90 capsule, Rfl: 3     loratadine (CLARITIN) 10 mg tablet, Take 10  "mg by mouth daily., Disp: , Rfl:      multivitamin therapeutic tablet, Take 1 tablet by mouth daily., Disp: , Rfl:      phentermine 37.5 MG capsule, Take 1 capsule (37.5 mg total) by mouth every morning., Disp: 30 capsule, Rfl: 2     folic acid (FOLVITE) 1 MG tablet, Take 1 tablet (1 mg total) by mouth daily., Disp: 30 tablet, Rfl: 0     valACYclovir (VALTREX) 500 MG tablet, TAKE 1 TABLET BY MOUTH TWICE DAILY UP TO 4-5 DAYS AS NEEDED, Disp: 30 tablet, Rfl: 2     ZOLMitriptan (ZOMIG) 2.5 MG tablet, Take 1 tab as needed for migraine, may repeat in 2 hours, Disp: 12 tablet, Rfl: 5    REVIEW OF SYSTEMS:    A 12 point ROS was reviewed and except for what is listed in the HPI above, all others are negative    Objective:    PE:  BP (!) 128/94 (Patient Site: Right Arm)   Pulse 96   Temp 98.4  F (36.9  C)   Resp 18   Ht 5' 3\" (1.6 m)   Wt 170 lb (77.1 kg)   BMI 30.11 kg/m    Wt Readings from Last 1 Encounters:   01/27/20 170 lb (77.1 kg)     Body mass index is 30.11 kg/m .    EXAM:  GENERAL: This is a well-developed 59 y.o. female who appears her stated age  EYES: Grossly normal.  MOUTH: Buccal mucosa normal   CARDIAC:  Normal S1 and S2, no Murmur  CHEST/LUNG:  Clear lung fields bilaterally  GASTROINTESINAL (ABDOMEN):Soft, non-tender, B/S present, no pulsatile mass     MUSCULOSKELETAL: Grossly normal and both lower extremities are intact.  HEME/LYMPH: No lymphedema  NEUROLOGIC: Focally intact, Alert and oriented x 3.   PSYCH: appropriate affect  INTEGUMENT: No open lesions or ulcers  Pulse Exam: Palpable  Circumferential measures:  No flowsheet data found.        DIAGNOSTIC STUDIES:     No results found.  I personally reviewed the following imaging today and those on care everywhere, if indicated    LABS:      Sodium   Date Value Ref Range Status   03/14/2019 142 136 - 145 mmol/L Final   02/05/2018 142 136 - 145 mmol/L Final   01/26/2017 144 136 - 145 mmol/L Final     Potassium   Date Value Ref Range Status "   03/14/2019 4.0 3.5 - 5.0 mmol/L Final   02/05/2018 4.0 3.5 - 5.0 mmol/L Final   01/26/2017 4.0 3.5 - 5.0 mmol/L Final     Chloride   Date Value Ref Range Status   03/14/2019 108 (H) 98 - 107 mmol/L Final   02/05/2018 106 98 - 107 mmol/L Final   01/26/2017 107 98 - 107 mmol/L Final     BUN   Date Value Ref Range Status   03/14/2019 23 (H) 8 - 22 mg/dL Final   02/05/2018 26 (H) 8 - 22 mg/dL Final   01/26/2017 21 8 - 22 mg/dL Final     Creatinine   Date Value Ref Range Status   03/14/2019 0.79 0.60 - 1.10 mg/dL Final   02/05/2018 0.75 0.60 - 1.10 mg/dL Final   01/26/2017 0.84 0.60 - 1.10 mg/dL Final     Hemoglobin   Date Value Ref Range Status   03/14/2019 13.6 12.0 - 16.0 g/dL Final   12/27/2018 14.3 12.0 - 16.0 g/dL Final   02/05/2018 13.9 12.0 - 16.0 g/dL Final     Platelets   Date Value Ref Range Status   03/14/2019 275 140 - 440 thou/uL Final   02/05/2018 291 140 - 440 thou/uL Final   01/26/2017 267 140 - 440 thou/uL Final     BNP   Date Value Ref Range Status   09/30/2015 <10 0 - 84 pg/mL Final       Total time spent 25 (15,25,35) minutes face to face with patient with more than 50% time spent in counseling and coordination of care.    Francisco Tavares MD  VASCULAR MEDICINE

## 2021-06-06 NOTE — PROGRESS NOTES
Vascular Medicine Progress note    Dr Francisco Tavares MD, Vascular Medicine      [facility]    Medical Record #:  [id number]    Date of Service: 02/24/2020     Date last seen:  1/27/2020    PRIMARY CARE PROVIDER: Minerva Lockhart MD      IMPRESSION/PLAN: Patient is here for CT scan results and lab results    Lab results showed no abnormalities, CT scan showed mild spinal canal stenosis at the level of L2 and L3    SUKUMAR resting and post exercise where normal with normal toe pressures, numbers indicates good wound healing capability    DISPOSITION:  1- continue with vascular risk factors modifications  2-back strengthening and core strengthening exercise  3- patient will be referred to podiatry surgery for very annoying left big toe bunion      ______________________________________________________________________    Subjective:    ALLERGIES:  Dog dander; Dust [other environmental allergy]; Mold extracts; and Topamax [topiramate]    MEDS:    Current Outpatient Medications:      cholecalciferol, vitamin D3, 400 unit Chew, Chew., Disp: , Rfl:      folic acid (FOLVITE) 1 MG tablet, Take 1 tablet (1 mg total) by mouth daily., Disp: 30 tablet, Rfl: 0     hydroCHLOROthiazide (MICROZIDE) 12.5 mg capsule, Take 1 capsule (12.5 mg total) by mouth daily., Disp: 90 capsule, Rfl: 3     loratadine (CLARITIN) 10 mg tablet, Take 10 mg by mouth daily., Disp: , Rfl:      multivitamin therapeutic tablet, Take 1 tablet by mouth daily., Disp: , Rfl:      phentermine 37.5 MG capsule, Take 1 capsule (37.5 mg total) by mouth every morning., Disp: 30 capsule, Rfl: 2     valACYclovir (VALTREX) 500 MG tablet, TAKE 1 TABLET BY MOUTH TWICE DAILY UP TO 4-5 DAYS AS NEEDED, Disp: 30 tablet, Rfl: 2     ZOLMitriptan (ZOMIG) 2.5 MG tablet, Take 1 tab as needed for migraine, may repeat in 2 hours, Disp: 12 tablet, Rfl: 5    REVIEW OF SYSTEMS:    A 12 point ROS was reviewed and except for what is listed in the HPI above, all others are  negative    Objective:    PE:  /90   Pulse 84   Temp 98.4  F (36.9  C)   Resp 18   Wt Readings from Last 1 Encounters:   01/27/20 170 lb (77.1 kg)     There is no height or weight on file to calculate BMI.    EXAM:  GENERAL: This is a well-developed 59 y.o. female who appears her stated age  EYES: Grossly normal.  MOUTH: Buccal mucosa normal   CARDIAC:  Normal S1 and S2, no Murmur  CHEST/LUNG:  Clear lung fields bilaterally  GASTROINTESINAL (ABDOMEN):Soft, non-tender, B/S present, no pulsatile mass     MUSCULOSKELETAL: Grossly normal and both lower extremities are intact.  HEME/LYMPH: No lymphedema  NEUROLOGIC: Focally intact, Alert and oriented x 3.   PSYCH: appropriate affect  INTEGUMENT: No open lesions or ulcers  Pulse Exam: palpable  Circumferential measures:  No flowsheet data found.        DIAGNOSTIC STUDIES:     Ct Lumbar Spine Without Contrast    Result Date: 1/29/2020  EXAM DATE:         01/29/2020 EXAM: CT LUMBAR SPINE WITHOUT CONTRAST LOCATION: [facility] DATE/TIME: 1/29/2020 1:00 PM INDICATION: Lower back pain. Patient endorses bilateral foot paresthesias. COMPARISON: None. TECHNIQUE: Routine without IV contrast. Multiplanar reformats. Dose reduction techniques were used. FINDINGS: Transitional lumbosacral anatomy. Taking the first nonrib-bearing segment V L1 there are 4 nonrib-bearing lumbar type segments and a sacralized L5. Mild lumbar levocurvature. 2 mm retrolisthesis at L2-L3. Preserved vertebral body heights. Presumed vertebral hemangioma at T12 and degenerative type endplate irregularities at L2-L3. No fracture or destructive osseous lesion. No extraspinal soft tissue abnormality. Unremarkable visualized bony pelvis. T12-L1: Mild loss of disc height. Shallow posterior annular bulge. Mild bilateral facet arthropathy. No spinal canal or neural foraminal stenosis. L1-L2: Mild to moderate loss of disc height. Circumferential disc bulge mild endplate spurring with  asymmetric left foraminal and lateral components. Mild to moderate facet arthropathy and ligamentum flavum thickening. Suspected mild narrowing of the left greater than right lateral recesses with minimal central spinal canal stenosis. No right neural foraminal stenosis. Minimal left neural foraminal stenosis. L2-L3: Moderate to advanced loss of disc height. Circumferential disc osteophyte complex with asymmetric left foraminal component. Mild facet arthropathy and ligamentum flavum thickening. Mild spinal canal stenosis with left greater than right lateral recess stenosis. Partial effacement inferior neural foramina with mild bilateral neural foraminal stenosis. L3-L4: Mild loss of disc height. Mild circumferential disc bulge and endplate spurring. Mild bilateral facet arthropathy. Mild trefoil type spinal canal stenosis. Mild bilateral neural foraminal stenosis. L4-L5: Minor loss of disc height. Shallow posterior disc bulge. Mild to moderate bilateral facet arthropathy. No spinal canal stenosis. Minimal bilateral neural foraminal stenosis. L5-S1: Transitional hypoplastic disc space. No herniation. No facet arthropathy. No spinal canal or neural foraminal stenosis. IMPRESSION: 1.  Transitional lumbosacral anatomy with a sacralized L5. Close correlation between this exam and radiographs is advised prior to any planned intervention. 2.  At L2-L3, moderate to advanced interbody degeneration with mild spinal canal stenosis. Suspected left greater than the right lateral recess stenosis and mild bilateral neural foraminal stenosis. 3.  At L3-L4, mild trefoil type spinal canal stenosis and mild bilateral neural foraminal stenosis. 4.  At L1-L2, mild narrowing of the lateral recesses. DICOM format image data is available to non-affiliated external healthcare facilities or entities on a secure, media-free, reciprocally searchable basis with patient authorization for at least a 12-month period after the study.     Us  "Arterial Pressures With Exercise Legs Bilateral    Result Date: 1/29/2020  Ultrasound Ankle Brachial Index with Exercise Indication: bilateral pain and cold feet Previous: none History: Previous Smoker and Hypertension Resting SUKUMAR's          Right: mmHg Index     Brachial: 132  Ankle-(PT): 151 1.14 Ankle-(DP): 163 1.23           Digit: 117 0.89               Left: mmHg Index     Brachial: 127  Ankle-(PT): 162 1.23 Ankle-(DP): 153 1.16           Digit: 113 0.86 Resting ankle-brachial index of 1.23 on the right. Toe Pressures of 117 mmHg and TBI of 0.89.  Resting ankle-brachial index of 1.23 on the left. Toe Pressures of 113 mmHg and TBI of 0.86. WAVEFORMS: The right dorsalis pedis and posterior tibial arteries show triphasic waveforms. The left dorsalis pedis and posterior tibial arteries show triphasic waveforms. Exercise Testing: Courtney-Cody Time (min) Grade Level % Rate MPH Level of Pain (1-10) Area of Pain 1 0 2.0  4  feet/toes 2 0 2.0  4  \" 3 2 2.0  4  \" 4 2 2.0  4  \" 5 4 2.0  4  \" 6 4 2.0  4  \" 7 6 2.0  4  \" 8 6 2.0  4  left calf numbess &both feet 9 8 2.0  4  \" 10 8 2.0  4  \" 11 10 2.0  4  \" 12 10 2.0  4  \" 13 12 2.0  4  \" 14 12 2.0  4  \" 15 14 2.0  4  \" 16 14 2.0  4  \" Post Exercise Pressures: Location: Rest 1 minute Left Ankle  199 Left Ankle  197 Right Brachial 132 151 Right SUKUMAR: 1.23 1.32 Left SUKUMAR: 1.23 1.30 Comments:  Impression: 1. RIGHT LOWER EXTREMITY: Normal ankle-brachial index of 1.23 with normal toe pressures of 117 mmHg 2. LEFT LOWER EXTREMITY: Normal ankle-brachial index of 1.23 with normal toe pressures of 113 mmHg 3. COURTNEY CODY TESTING: Patient able to walk for full 16 minutes on Courtney Cody testing although complaining of left calf numbness in both feet numbness starting at about 8 minutes.  No drop in ABIs post testing support that the patient does not have vascular claudication.     I personally reviewed the following imaging today and those on care everywhere, if " indicated    LABS:      Sodium   Date Value Ref Range Status   03/14/2019 142 136 - 145 mmol/L Final   02/05/2018 142 136 - 145 mmol/L Final   01/26/2017 144 136 - 145 mmol/L Final     Potassium   Date Value Ref Range Status   03/14/2019 4.0 3.5 - 5.0 mmol/L Final   02/05/2018 4.0 3.5 - 5.0 mmol/L Final   01/26/2017 4.0 3.5 - 5.0 mmol/L Final     Chloride   Date Value Ref Range Status   03/14/2019 108 (H) 98 - 107 mmol/L Final   02/05/2018 106 98 - 107 mmol/L Final   01/26/2017 107 98 - 107 mmol/L Final     BUN   Date Value Ref Range Status   03/14/2019 23 (H) 8 - 22 mg/dL Final   02/05/2018 26 (H) 8 - 22 mg/dL Final   01/26/2017 21 8 - 22 mg/dL Final     Creatinine   Date Value Ref Range Status   03/14/2019 0.79 0.60 - 1.10 mg/dL Final   02/05/2018 0.75 0.60 - 1.10 mg/dL Final   01/26/2017 0.84 0.60 - 1.10 mg/dL Final     Hemoglobin   Date Value Ref Range Status   03/14/2019 13.6 12.0 - 16.0 g/dL Final   12/27/2018 14.3 12.0 - 16.0 g/dL Final   02/05/2018 13.9 12.0 - 16.0 g/dL Final     Platelets   Date Value Ref Range Status   03/14/2019 275 140 - 440 thou/uL Final   02/05/2018 291 140 - 440 thou/uL Final   01/26/2017 267 140 - 440 thou/uL Final     BNP   Date Value Ref Range Status   09/30/2015 <10 0 - 84 pg/mL Final       Total time spent 15 (15,25,35) minutes face to face with patient with more than 50% time spent in counseling and coordination of care.    Francisco Tavares MD  VASCULAR MEDICINE

## 2021-06-07 NOTE — PROGRESS NOTES
"Carole Davidson is a 59 y.o. female who is being evaluated via a billable telephone visit.      The patient has been notified of following:     \"This telephone visit will be conducted via a call between you and your physician/provider. We have found that certain health care needs can be provided without the need for a physical exam.  This service lets us provide the care you need with a short phone conversation.  If a prescription is necessary we can send it directly to your pharmacy.  If lab work is needed we can place an order for that and you can then stop by our lab to have the test done at a later time.    Telephone visits are billed at different rates depending on your insurance coverage. During this emergency period, for some insurers they may be billed the same as an in-person visit.  Please reach out to your insurance provider with any questions.    If during the course of the call the physician/provider feels a telephone visit is not appropriate, you will not be charged for this service.\"    Patient has given verbal consent to a Telephone visit? Yes    Patient would like to receive their AVS by AVS Preference: Mail a copy.      Subjective:    Carole Davidson is a 59 y.o. female who presents for refill of her medication for Valtrex, Zomig for migraines and phentermine for weight loss.    She typically gets headaches perhaps 3 times a week are sometimes not.  She has been on controller medication Topamax in the past and informs me but that did not help.    She is taking phentermine for weight loss.  For noted elevated blood pressure, she was initiated on hydrochlorothiazide.  She was supposed to follow back in clinic in April.  She does not check blood pressures at home though she does have a blood pressure machine.    She needs refill for Valtrex     .    Problem List, Past Medical History, Social History, Family History, Medications, and Allergies reviewed in [facility].      Review of Systems - " General ROS: negative  Respiratory ROS: no cough, shortness of breath, or wheezing  negative  Cardiovascular ROS: negative      Objective:     There were no vitals taken for this visit.   Patient is alert and oriented and in no acute distress.  Speech is normal.  Thought content is normal.        Assessment/ Plan:    1. High risk medication use     2. Herpes  valACYclovir (VALTREX) 500 MG tablet   3. Migraine without aura and without status migrainosus, not intractable  ZOLMitriptan (ZOMIG) 2.5 MG tablet     Refill for Valtrex and Zomig is done.  Regarding weight loss drug phentermine, I will be forward it to Dr. Aguilera.  I have asked patient to monitor blood pressures and send a [employer] message to PCP.    Briefly discussed that Zomig and phentermine use should be monitored with high blood pressure.  Patient may be also a candidate for future prophylactic migraine therapy and/or referral to neurology for headache clinic.    Phone call duration:  11  minutes    Martine Alston MD

## 2021-06-08 NOTE — PROGRESS NOTES
Assessment/Plan:  1. Blood in stool  Ambulatory referral for Colonoscopy   2. Dysplastic nevi  Ambulatory referral to Dermatology   3. Vitamin D deficiency  Vitamin D, Total (25-Hydroxy)   4. Routine general medical examination at a health care facility  Mammo Diagnostic Bilateral   5. Migraine without aura     6. Adenomatous colon polyp  Ambulatory referral for Colonoscopy   7. Herpes dermatitis     8. Sebaceous cyst  Ambulatory referral to Dermatology    Mammo Diagnostic Bilateral   9. Hyperlipidemia  High Sensitivity C-Reactive Protein (hsCRP)    Lipid Cascade   10. Fatigue  HM2(CBC w/o Differential)    Thyroid Cascade   11. Urinary frequency  Urinalysis-UC if Indicated    Comprehensive Metabolic Panel    Culture, Urine   12. Snoring       Patient is a 56 y.o. female here for physical exam. See health maintenance section below.      Fasting labs today.    Sleep study if wishes.    Referral to dermatology Dr. Sharpe for full body skin check. If lump in right upper breast area is a sebaceous cyst, as if she can remove the cyst.     Make sure to eat 8396-5891 calories per day. 30 min of aerobic activity daily.     If TSH is over 3, may start a thyroid medication.     Grief counseling if wishes.     Schedule diagnostic mammogram for routine mammogram and diagnosis of lump above right breast.     Urinalysis today, if wishes may refer to urology or prescribe a medication for frequent urination.    Start phentermine 15 mg in the morning, ideally 30 min before breakfast. Reduce caffeine intake to 1 cup or less. Follow up every 2-3 months for weight and med check.    Breast exams: Once per month on your own, once per year by a doctor, and a mammogram once per year.     If any pelvic discomfort or bloating, call or email and will order a pelvic ultrasound.      HPI    Chief Complaint   Patient presents with     Annual Exam     physical w/pap- pt is fatsing     Fatigue: She continues to have fatigue and difficult falling  asleep, which was noted at her previous physical on 2015. She did not follow up at the sleep clinic after her previous physical and does not want a sleep apnea diagnosis if she would have use a CPAP machine. She has been told that she snores, but has not been told that she has pauses in her breathing while sleeping. She says her job has been a lot of work recently and mentally exhausting.  Her 35-year-old son  2 months ago of colon cancer.    Atypical Nevi: She had 3 precancerous biopsies of abnormal nevi from dermatologist Dr. Sharpe. She has a few more abnormal moles that are concerning and she would like to have examined.     Weight Gain: She says she has been gained weight and continues to gain weight. Until the last 4 months, she was exercising at least 3 times per week for over an hour, but she said she still was gaining weight. She says she is unable to eat the recommended 7771-8710 calories per day, and could only maintain her weight while exercising and limiting to 900 calories daily. She is interested in a weight loss medication. She is also wondering if this is related to hypothyroidism. Her last thyroid lab was 2.43 on 2015.    Cyst: Above the right breast, she has a hard, dark lump. It has been there for 2 years. She had it examined by ultrasound during her mammogram on 10/13/2015 and was told it was in the skin and not in the breast tissue.  Told it was a sebaceous cyst.  Since the ultrasound, it has grown about 4 times its original size. She would like to have it removed.     Frequent Urination: She wakes 2-3 times during the night to go to the bathroom. She denies any leakage, or any leakage when coughing or sneezing. She does have the urge to urinate and has to run to the bathroom quickly.    Blood in stool: She is recently had blood in her stool and with wiping.    Health Maintenance   Topic Date Due     TD 18+ HE  Due 2023     COLONOSCOPY  Ordered, set now please     TDAP ADULT  ONE TIME DOSE  See above     ADVANCE DIRECTIVES DISCUSSED WITH PATIENT  Packet given     INFLUENZA VACCINE RULE BASED (1) Declined     MAMMOGRAM  Done Oct. 2015 due now, will order     PAP SMEAR  Pap due next year, done Sept. 2015     DEXA scan-Will get report and if normal , due in 2 years, since done last year    Patient Active Problem List   Diagnosis     Migraine without aura     Herpes dermatitis     Vitamin D deficiency     Edema     Nocturia     Adenomatous colon polyp     Dysplastic nevi     Sebaceous cyst     Hyperlipidemia     Fatigue     Urinary frequency     Snoring     Post-menopause     Current Outpatient Prescriptions   Medication Sig     loratadine (CLARITIN) 10 mg tablet Take 10 mg by mouth daily.     valACYclovir (VALTREX) 500 MG tablet Take 1 tablet (500 mg total) by mouth 2 (two) times a day.     ZOLMitriptan (ZOMIG) 2.5 MG tablet Take 1 tab as needed for migraine, may repeat in 2 hours     phentermine 15 MG capsule Take 1 capsule (15 mg total) by mouth every morning.       Patient is a 56 y.o. female presents for a physical exam.    The following portions of the patient's history were reviewed and updated as appropriate: allergies, current medications, past family history, past medical history, past social history, past surgical history and problem list.    Review of Systems  Pertinent items are noted in HPI.  A 12 point comprehensive review of systems was negative except as noted.     Immunization History   Administered Date(s) Administered     Pneumo Polysac 23-V 01/01/2010     Td, historic 08/29/2005     Tdap 12/06/2013     Recent Results (from the past 240 hour(s))   HM2(CBC w/o Differential)   Result Value Ref Range    WBC 8.3 4.0 - 11.0 thou/uL    RBC 4.71 3.80 - 5.40 mill/uL    Hemoglobin 14.4 12.0 - 16.0 g/dL    Hematocrit 43.2 35.0 - 47.0 %    MCV 92 80 - 100 fL    MCH 30.6 27.0 - 34.0 pg    MCHC 33.4 32.0 - 36.0 g/dL    RDW 11.2 11.0 - 14.5 %    Platelets 267 140 - 440 thou/uL    MPV  "8.1 7.0 - 10.0 fL   Urinalysis-UC if Indicated   Result Value Ref Range    Color, UA Yellow Colorless, Yellow, Straw, Light Yellow    Clarity, UA Clear Clear    Glucose, UA Negative Negative    Bilirubin, UA Small (!) Negative    Ketones, UA 40 mg/dL (!) Negative    Specific Gravity, UA 1.025 1.002 - 1.030    Blood, UA Negative Negative    pH, UA 5.5 4.5 - 8.0    Protein, UA Trace (!) Negative mg/dL    Urobilinogen, UA 0.2 E.U./dL 0.2 E.U./dL, 1.0 E.U./dL    Nitrite, UA Negative Negative    Leukocytes, UA Trace (!) Negative    Bacteria, UA None Seen None Seen hpf    RBC, UA None Seen None Seen, 0-2 hpf    WBC, UA 0-5 None Seen, 0-5 hpf    Squam Epithel, UA 0-5 None Seen, 0-5 lpf     The following high BMI interventions were performed this visit: encouragement to exercise, weight monitoring and dietary management education, guidance, and counseling     Objective:    Visit Vitals     /78 (Patient Site: Left Arm, Patient Position: Sitting, Cuff Size: Adult Large)     Pulse 68     Temp 97.9  F (36.6  C) (Oral)     Resp 16     Ht 5' 2.75\" (1.594 m)     Wt 180 lb (81.6 kg)     BMI 32.14 kg/m2         General Appearance:    Alert, cooperative, no distress, appears stated age   Head:    Normocephalic, without obvious abnormality, atraumatic   Eyes:    PERRL, conjunctiva/corneas clear, EOM's intact, fundi     benign, both eyes   Ears:    Normal TM's and external ear canals, both ears   Nose:   Nares normal, septum midline, mucosa normal, no drainage    or sinus tenderness   Throat:   Lips, mucosa, and tongue normal; teeth and gums normal   Neck:   Supple, symmetrical, trachea midline, no adenopathy;     thyroid:  no enlargement/tenderness/nodules; no carotid    bruit or JVD   Back:     Symmetric, no curvature, ROM normal, no CVA tenderness   Lungs:     Clear to auscultation bilaterally, respirations unlabored   Chest Wall:    No tenderness or deformity    Heart:    Regular rate and rhythm, S1 and S2 normal, no murmur, " rub   or gallop   Breast Exam:    No tenderness, masses, or nipple abnormality. 1 cm purple cyst near right base of axilla.   Abdomen:     Soft, non-tender, bowel sounds active all four quadrants,     no masses, no organomegaly   Genitalia:    Normal female without lesion, discharge or tenderness. unable to palpate uterus and ovaries secondary to body habitus       Extremities:   Extremities normal, atraumatic, no cyanosis or edema   Pulses:   2+ and symmetric all extremities   Skin:   Skin color, texture, turgor normal, no rashes or lesions   Lymph nodes:   Cervical, supraclavicular, and axillary nodes normal   Neurologic:   CNII-XII intact, normal strength, sensation and reflexes     throughout        The visit lasted a total of 55 minutes face to face with the patient. About 40% of the time was spent counseling and educating the patient about exam and 50% of the time was spent counseling and educating with regards to cyst, weight gain, fatigue, and about 10% of the time was spent discussing frequent urination and atypical nevi.    I, Nadja Child, am scribing for and in the presence of, Dr. Minerva Lockhart.    I, Dr. Minerva Lockhart, personally performed the services described in this documentation, as scribed by Nadja Child in my presence, and it is both accurate and complete.

## 2021-06-08 NOTE — PROGRESS NOTES
This is a surgical consult from Dr. Minerva Lockhart;    Assessment:      breast mass    In the axillary region of the R Breast that has enlarged over the last few years.     Plan:      Excisional Biopsy ASAP         Subjective:       Carole Davidson is an 56 y.o. female who presents for evaluation of a breast mass. Change was noted 2 years ago, and has been gradually worsening since first identified. Patient does routinely do self breast exams. The mass is tender. Patient denies nipple discharge. Breast cancer risk factors include: family hx on mother's side.    The following portions of the patient's history were reviewed and updated as appropriate: allergies, current medications, past family history, past medical history, past social history and past surgical history.    Review of Systems  A comprehensive review of systems was negative except for: Integument/breast: positive for breast lump  in the Axillary aspect of the Right Breast.    Objective:        General Appearance:    Alert, cooperative, no distress, appears stated age   Head:    Normocephalic, without obvious abnormality, atraumatic   Eyes:     conjunctiva/corneas clear, EOM's intact,       Nose:   Nares normal, no drainage   Throat:   Lips, mucosa, and tongue normal; teeth and gums normal   Neck:   Supple, symmetrical, trachea midline, no adenopathy;     thyroid:  no enlargement/tenderness/nodules; no carotid    bruit or JVD       Lungs:     Clear to auscultation bilaterally, respirations unlabored   Chest Wall:    No tenderness or deformity    Heart:    Regular rate and rhythm, S1 and S2 normal, no murmur, rub   or gallop   Breast Exam:    A 1 cm mass leading up to the R Axilla, Comes up to the skin,  No adenopathy but the pt does have tenderness to palpation in both axilla.  No nipple abnormality   Abdomen:     Soft, non-tender, bowel sounds active all four quadrants,     no masses, no organomegaly           Extremities:   Extremities normal,  atraumatic, no cyanosis or edema   Pulses:   2+ and symmetric all extremities       Lymph nodes:   Cervical, supraclavicular, and axillary nodes normal   Neurologic:   CNII-XII intact, normal strength, sensation throughout         I reviewed these studies personally.  MAMMO DIAGNOSTIC 3D BILATERAL, US BREAST LIMITED (FOCAL) RIGHT:    2/6/2017 2:42 PM     INDICATION: Enlarging right breast lump.  COMPARISON: 06/02/2008, 10/13/2015.     MAMMOGRAPHIC FINDINGS: Bilateral full-field digital diagnostic mammograms performed. Breast tissue is composed of scattered fibroglandular densities. There is a rounded density in the right upper outer breast zone 3 at the 11:00 position in the immediate  subcutaneous tissue which has increased in size since the prior study. It is smoothly marginated. Images evaluated with the assistance of CAD. Breast tomosynthesis was used in interpretation.     ULTRASOUND FINDINGS: Because of the palpable lesion and interval increase in size from the prior study, further evaluation targeted right breast ultrasound was obtained. Ultrasound was performed by the ultrasonographer and by the radiologist. At the site  of the palpable abnormality there is a circumscribed hypoechoic solid nodule with smooth margins and posterior acoustical enhancement measuring 1.2 x 0.9 x 1 cm. It does appear to extend into the skin but now contains blood vessels at color Doppler. On   the prior study it measured 5 x 4 x 6 mm and no flow was detected with color Doppler. Given the fact that it is solid and has enlarged in size and now contains discernible blood flow biopsy is recommended. The patient states she would like to have the   lesion excised.     IMPRESSION:   Palpable lump located in the mixed intradermal and subcutaneous location has increased in size. It appears smoothly marginated with posterior acoustical enhancement but now contains blood flow. It may represent a fibroadenoma. Malignancy is not excluded   and  biopsy is recommended. The patient would prefer an excisional biopsy rather than an ultrasound-guided core.     ACR BI-RADS Category 4: Suspicious.      [facility]  [phone]

## 2021-06-08 NOTE — PROGRESS NOTES
Carole is scheduled for excisional biopsy of right breast mass with Dr. Puga on 2/16/17 at [facility]. Patient was given instructions of arrival time, need a ,  and NPO after midnight. Patient verbalized understanding.     Dr. Puga will do pre-op for surgery    Sobia Lin [facility]  Physician    [facility]   [phone]

## 2021-06-09 NOTE — PROGRESS NOTES
GENERAL SURGICAL CONSULTATION    I was requested by Minerva Lockhart MD to consult on this pt to evaluate them for dilation and potential removal of excess breast tissue.    HPI:  This is a 56 y.o. female here today with a prominent accessory roll of tissue in the anterior axilla bilaterally.  The patient has noted this for Sandy adult life but has become more pronounced in the last 5 years.  These areas have become quite sore.  The pain that she experiences with these rolls of tissue are worse at times.  She has had fairly good success with skin care but she has struggled with the discomfort that accompanies these areas on the anterior axilla.     Allergies:Dog dander; Dust [other environmental allergy]; and Mold extracts    Past Medical History:   Diagnosis Date     Adenomatous colon polyp 1/26/2017       Past Surgical History:   Procedure Laterality Date     APPENDECTOMY  1978     BREAST EXCISIONAL BIOPSY Right 02/16/2017    DR. CORRAL     BUNIONECTOMY  Feb. 2015     KNEE SURGERY  1978       CURRENT MEDS:  Current Outpatient Prescriptions   Medication Sig Dispense Refill     loratadine (CLARITIN) 10 mg tablet Take 10 mg by mouth daily.       valACYclovir (VALTREX) 500 MG tablet Take 1 tablet (500 mg total) by mouth 2 (two) times a day. 30 tablet 5     ZOLMitriptan (ZOMIG) 2.5 MG tablet Take 1 tab as needed for migraine, may repeat in 2 hours 12 tablet 5     No current facility-administered medications for this visit.        Family History   Problem Relation Age of Onset     Breast cancer Paternal Grandmother      Hypertension Mother      Cancer Mother      Rectal     Asthma Mother      Hypothyroidism Mother      Depression Sister      Colon cancer Son 34     Thrombocytopenia Son      ITP     Depression Father      Alcohol abuse Father      Prostate cancer Brother      Anxiety disorder Brother      Anxiety disorder Brother      No Medical Problems Brother      Family history is not pertinent to this patients  "Chief Complaint.     reports that she has never smoked. She has never used smokeless tobacco. She reports that she drinks alcohol. She reports that she does not use illicit drugs.    Review of Systems -   10 point Review of systems is negative except for; as mentioned above in HPI and PMHx    Visit Vitals     BP (!) 133/92     Pulse 92     Ht 5' 2.75\" (1.594 m)     Wt 180 lb (81.6 kg)     SpO2 98%     BMI 32.14 kg/m2     Body mass index is 32.14 kg/(m^2).    EXAM:  GENERAL: Well developed female  HEENT: EOMI, Anicteric Sclera, Moist Mucous Membranes,  In Mouth the pt does not have redness or bleeding gums  CARDIOVASCULAR: RRR w/out murmur   CHEST/LUNG: Clear to Auscultation,  patient has prominent roll of tissue on both the right and left axilla.  These rolls of tissue are tender to palpation.  There is no erythema or induration.  The right side is status post a breast biopsy and the incision at that area is healing nicely.  ABDOMEN:  Non tender to palpation, +BS  MUSCULOSKELETAL:  No deformities with good range of motion in all extremities  NEURO: She is ambulatory with good strength in both legs.  HEME/LYMPH: No Cervical Adenopathy or tenderness.     IMAGES:  None    Assessment/Plan:  56-year-old patient status post a breast biopsy from which we found a schwannoma which was benign.  She is recovering nicely from the surgery.  Her concern at this time and has to do with a roll of the anterior axilla on both the right and left side.  I suspect the tissue within these rolls of skin or combination of subcutaneous fat and breast tissue.  The location of these rolls of tissue or cause for considerable discomfort particularly with exercise.    We will picture these areas of excessive breast tissue and apeal to the patient's insurance copy to support her in the removal of this extra breast tissue.      Nagi Puga MD  [facility]  [phone]    "

## 2021-06-09 NOTE — PROGRESS NOTES
SUBJECTIVE   Carole Davidson is a 56 y.o. old female with a past medical history of HLD, migraines and colon polyp who presented to clinic today for further evaluation of possible sinus infection. She reports cough, fatigue, runny nose, ear pain, sore throat and facial pain and headache located behind her eyes. Has had several sick contacts for the last month. Symptoms have been there for 5 days. Symptoms are getting worse. Reports fever 101.1F taken at home. She reports her worst symptom is the headache and facial pain and cough. Her throat burns when she coughs. She's been taking Nyquil which helps the cough at night and helps her sleep. Reports recent to [place] but not out of the country. Has had sinus infection before, almost every year. Hasn't used a Netti pot as she doesn't like the idea of Netti pot.       Medical History  Active Ambulatory (Non-Hospital) Problems    Diagnosis     Adenomatous colon polyp     Dysplastic nevi     Sebaceous cyst     Hyperlipidemia     Fatigue     Urinary frequency     Snoring     Post-menopause     Migraine without aura     Herpes dermatitis     Vitamin D deficiency     Edema     Nocturia     Past Medical History:   Diagnosis Date     Adenomatous colon polyp 1/26/2017       Surgical History  She  has a past surgical history that includes Bunionectomy (Feb. 2015); Appendectomy (1978); Knee surgery (1978); and Breast excisional biopsy (Right, 02/16/2017).    Social History  Reviewed, and she  reports that she has never smoked. She has never used smokeless tobacco. She reports that she drinks alcohol. She reports that she does not use illicit drugs.    Family History  Reviewed, and family history includes Alcohol abuse in her father; Anxiety disorder in her brother and brother; Asthma in her mother; Breast cancer in her paternal grandmother; Cancer in her mother; Colon cancer (age of onset: 34) in her son; Depression in her father and sister; Hypertension in her mother;  Hypothyroidism in her mother; No Medical Problems in her brother; Prostate cancer in her brother; Thrombocytopenia in her son.    Medications  Reviewed and reconciled.      Allergies  Allergies   Allergen Reactions     Dog Dander      Dust [Other Environmental Allergy]      Mold Extracts          OBJECTIVE  Physical Exam:  Vital signs: /70 (Patient Site: Left Arm, Patient Position: Sitting, Cuff Size: Adult Regular)  Temp 99.3  F (37.4  C) (Oral)   Resp 16  Wt 177 lb 4 oz (80.4 kg)  BMI 31.65 kg/m2  Weight: 177 lb 4 oz (80.4 kg)    General appearance: pleasant, appears stated age, cooperative and in no distress  Eyes: EOMs intact, PERRL, conjunctivae normal.   ENT: moist oral mucosa, posterior oropharynx mildly erythematous, TMs bulging but no erythema, maxillary sinus exquisitely tender to palpation  Lymph: no cervical/supraclavicular adenopathy  Respiratory: clear to auscultation bilaterally, good air movement throughout, no wheezing or crackles, speaking full sentences without difficulty  Cardiovascular: regular rate and rhythm, no murmur appreciated, no leg edema  Skin: no rashes  Neuro: alert oriented x 3, grossly normal otherwise  Psych: normal affect, appropriate conversation     ASSESSMENT/ PLAN    1. Acute maxillary sinusitis, recurrence not specified  48-year-old female with a past medical history of hyperlipidemia who is here for possible sinus infection.  Patient appears well in examination with normal vital signs and normal cardiopulmonary examination.  Does have facial pain and facial tenderness upon palpation.  Also fever at home 101.1F. symptoms are worsening and has been going on for 5 days.  Most likely an acute sinusitis infection.  I think patient would benefit from a course of Augmentin twice daily for 10 days.  Patient agrees with plan.  Advised patient that she should feel better in the next week and certainly let me know if her symptoms do not improve in the next few days.  -  amoxicillin-clavulanate (AUGMENTIN) 875-125 mg per tablet; Take 1 tablet by mouth 2 (two) times a day for 10 days.  Dispense: 20 tablet; Refill: 0    2. Cough  Cardiopulmonary exam is normal.  Most likely related to her sinusitis and cold symptoms.  We'll treat symptomatically with cough syrup with codeine.  Advised patient not to drive or operate machinery while taking the medication.  Patient verbalized understanding  - codeine-guaiFENesin (GUAIFENESIN AC)  mg/5 mL liquid; Take 5 mL by mouth 2 (two) times a day as needed.  Dispense: 60 mL; Refill: 0      Dianna Jin MD

## 2021-06-10 NOTE — PROGRESS NOTES
Chief Complaint   Patient presents with     Follow-up     med check/med refill       Subjective: Carole Davidson comes in today for a medication check for Phentermine. No chest pain, palpations or shortness of breath. They have been on the med for  3 months. They have lost 0 pounds in the last month and 2.5 pounds overall. They exercise 60 minutes 5 days a week. They are not journaling calories.     Objective: /78 (Patient Site: Left Arm, Patient Position: Sitting, Cuff Size: Adult Regular)  Pulse 76  Resp 16  Wt 177 lb 7 oz (80.5 kg)  BMI 31.68 kg/m2  General: No apparent distress  CV: Regular rate and rhythm without murmur  Lungs: Clear to auscultation bilaterally    Assessment: Medication check for Phentermine    Plan: The following high BMI interventions were performed this visit: dietary needs education  Stop med and mian COOPER if any chest pain, palpations or shortness of breath. Follow-up in 1 month. This is month # 4 . Phentermine dose is 30 mg. Patient requested increased dose today and this was provided. We discussed counting calories as she seems to have plateaued. In discussing diet it seems she may not be eating enough throughout the day. She will start to journal and bring this to her next visit. She is also going to try and increase exercise.    Viry Frausto CNP    This note has been dictated using voice recognition software. Any grammatical or context distortions are unintentional and inherent to the software

## 2021-06-11 NOTE — PROGRESS NOTES
Chief Complaint   Patient presents with     Follow-up     follow up phentermine      Subjective: Carole Davidson comes in today for a medication check for Phentermine 30 mg. She has had 4 months of phentermine prescriptions, and is not taking the medication every day so she used 2 1-month prescriptions over the last 3 months. They have been on the med off and on for 6 months. She is doing well on the medication and is finding it helpful for appetite suppression, and would like to increase the dose. No chest pain, palpations or shortness of breath. They have lost 4 pounds in the last 3 months and 10 pounds overall. She is active 3 days a week and is agreeable to adding a regular walk 2-3 days per week. She is not journaling calories, but is eating the same foods every week so she is aware she is limiting calories.     Objective: /82 (Patient Site: Left Arm, Patient Position: Sitting, Cuff Size: Adult Regular)  Pulse 76  Temp 98.4  F (36.9  C) (Oral)   Resp 16  Wt 173 lb (78.5 kg)  BMI 30.89 kg/m2  General: No apparent distress  CV: Regular rate and rhythm without murmur  Lungs: Clear to auscultation bilaterally    Assessment: Medication check for Phentermine  1. High risk medication use         Plan: The following high BMI interventions were performed this visit: encouragement to exercise, weight monitoring, dietary needs education, weight loss from baseline weight, dietary management education, guidance, and counseling and lifestyle education regarding diet  Stop med and mian MD if any chest pain, palpations or shortness of breath. Follow-up in 3 months. This is prescription #5 and months # 5-7. Will recount prescriptions at next visit. Phentermine dose is 37.5 mg.    The visit lasted a total of 15 minutes face to face with the patient. Over 50% of the time was spent counseling and educating the patient about weight loss.     Nadja EDOUARD, am scribing for and in the presence of, Dr. Palma  JIM Lockhart MD.    I, Dr. Minerva Lockhart MD, personally performed the services described in this documentation, as scribed by Nadja Child in my presence, and it is both accurate and complete.

## 2021-06-11 NOTE — PATIENT INSTRUCTIONS - HE
Declined hepatitis A and B shots for now.    Continue with exercise shooting for 4 hours a week if possible.  Continue to watch calories and carbs.  Bariatric dietitian consult if you wish.    Increase hydrochlorothiazide to 25 mg daily.  You can take 2 of the 12.5 mg daily together until they are gone.  We are sending a new prescription for 25 mg tablets.    Continue to check blood pressure at home and if consistently over 140 on top or over 90 on the bottom please let us know.    Please set office visit in 6 months for med check.    Can give the body a break from phentermine at this point.  If wishing to restart in 6 months at the med check we can discuss that.  If going back on phentermine at the 6-month med check then we will do the urine tox and the controlled substance agreement.    Dermatology consult given for full body skin check with history of dysplastic nevi.    Surgery consult offered for the lump that has increased in size on the left forearm area.  Patient would like to discuss this again at the 6-month med check.      Health Maintenance   Topic Date Due     HEPATITIS C SCREENING  Today     HIV SCREENING  Today     ZOSTER VACCINES (1 of 2) If you are interested in the new shingles shot, Shingrix, please check with insurance for coverage either in clinic or at a pharmacy. It is a 2 shot series 2-6 months apart.      PREVENTIVE CARE VISIT  Today     INFLUENZA VACCINE RULE BASED (1) Declined     PAP SMEAR  Today     HPV TEST  Today     MAMMOGRAM  04/10/2020, ordered     TD 18+ HE  12/06/2023     LIPID  Today     ADVANCE CARE PLANNING  Packet given     COLORECTAL CANCER SCREENING  01/15/2023     TDAP ADULT ONE TIME DOSE  Completed     HEPATITIS B VACCINES  Declined      Dexa-Ordered

## 2021-06-11 NOTE — PROGRESS NOTES
Assessment/Plan:  1. Routine general medical examination at a health care facility  Gynecologic Cytology (PAP Smear)    HIV Antigen/Antibody Screening Traver    Hepatitis C Antibody (Anti-HCV)   2. Menopause  DXA Bone Density Scan   3. Mixed hyperlipidemia  Lipid Cascade FASTING   4. Vitamin D deficiency  Vitamin D, Total (25-Hydroxy)   5. Family history of hypothyroidism  Thyroid Traver   6. Visit for screening mammogram  Mammo Screening Bilateral   7. Benign essential hypertension  Comprehensive Metabolic Panel    hydroCHLOROthiazide (HYDRODIURIL) 25 MG tablet    HM2(CBC w/o Differential)   8. Dysplastic nevi  Ambulatory referral to Dermatology     Declined hepatitis A and B shots for now.    Continue with exercise shooting for 4 hours a week if possible.  Continue to watch calories and carbs.  Bariatric dietitian consult if you wish.    Increase hydrochlorothiazide to 25 mg daily.  You can take 2 of the 12.5 mg daily together until they are gone.  We are sending a new prescription for 25 mg tablets.    Continue to check blood pressure at home and if consistently over 140 on top or over 90 on the bottom please let us know.    Please set office visit in 6 months for med check.    Can give the body a break from phentermine at this point.  If wishing to restart in 6 months at the med check we can discuss that.  If going back on phentermine at the 6-month med check then we will do the urine tox and the controlled substance agreement.    Dermatology consult given for full body skin check with history of dysplastic nevi.    Surgery consult offered for the lump that has increased in size on the left forearm area.  Patient would like to discuss this again at the 6-month med check.    Patient is a 59 y.o. female here for physical exam. See health maintenance section below. Labs as ordered.        HPI    Chief Complaint   Patient presents with     Annual Exam     w/ pap, pt is fasting     Weight: Has kept her 15 pounds  off but having a hard time losing weight.  She is down 3 pounds from December 2019.  BMI 30.3.  Last visit for phentermine was last December.  At that point she had been on the phentermine for about 9 months.  She has gotten about 3 prescriptions this year.  She uses a pill every other day to every day.  Works 10-12 hours a day 5 days a week and some weekends.  Working from home.  Mom lives with her.  Busier then she has ever been at work.  Higher stress then ever at work.  Sometimes takes a walk over lunch.  On non work days, walks 60 minutes.  When at the cabin, does a lot of physical work. No side effect from Phentermine.    Hypertension:  Home -125 / 89-95  . No over 130 for months.  On HCTZ 12.5 mg.  No chest pain, headaches.    Lumps: Patient's had a lump on the left forearm that has increased in size.  It bothers her when she is working on the computer.  She has multiple lumps throughout her body.  No other concerns.    Health Maintenance   Topic Date Due     HEPATITIS C SCREENING  Today     HIV SCREENING  Today     ZOSTER VACCINES (1 of 2) If you are interested in the new shingles shot, Shingrix, please check with insurance for coverage either in clinic or at a pharmacy. It is a 2 shot series 2-6 months apart.      PREVENTIVE CARE VISIT  Today     INFLUENZA VACCINE RULE BASED (1) Declined     PAP SMEAR  Today     HPV TEST  Today     MAMMOGRAM  04/10/2020, ordered     TD 18+ HE  12/06/2023     LIPID  Today     ADVANCE CARE PLANNING  Packet given     COLORECTAL CANCER SCREENING  01/15/2023     TDAP ADULT ONE TIME DOSE  Completed     HEPATITIS B VACCINES  Declined      Dexa-Ordered  Patient Active Problem List   Diagnosis     Migraine without aura     Herpes dermatitis     Vitamin D deficiency     Nocturia     Adenomatous colon polyp     Dysplastic nevi     Hyperlipidemia     Urinary frequency     Snoring     Post-menopause     High risk medication use     History of adenomatous polyp of colon     Family  history of hypothyroidism     Controlled substance agreement signed-Phentermine 37.5 mg, max OF 30 PER 30 DAYS, CSA AND URINE TOX DONE JUNE 2019     Current Outpatient Medications   Medication Sig     cholecalciferol, vitamin D3, 400 unit Chew Chew.     multivitamin therapeutic tablet Take 1 tablet by mouth daily.     phentermine 37.5 MG capsule Take 1 capsule (37.5 mg total) by mouth every morning.     valACYclovir (VALTREX) 500 MG tablet TAKE 1 TABLET BY MOUTH TWICE DAILY UP TO 4-5 DAYS AS NEEDED     ZOLMitriptan (ZOMIG) 2.5 MG tablet TAKE 1 TABLET BY MOUTH AS NEEDED FOR MIGRAINE, MAY REPEAT IN 2 HOURS     hydroCHLOROthiazide (HYDRODIURIL) 25 MG tablet Take 1 tablet (25 mg total) by mouth daily.       Patient is a 59 y.o. female presents for a physical exam.    The following portions of the patient's history were reviewed and updated as appropriate: past medical history, past social history, past surgical history and problem list.    Review of Systems  Pertinent items are noted in HPI.  Immunization History   Administered Date(s) Administered     Influenza,H1N1,Pandemic,split PF,IM 12/23/2009     Pneumo Polysac 23-V 01/01/2010     Td, adult adsorbed, PF 08/29/2005     Td,adult,historic,unspecified 08/29/2005     Tdap 12/06/2013     Recent Results (from the past 240 hour(s))   Comprehensive Metabolic Panel   Result Value Ref Range    Sodium 142 136 - 145 mmol/L    Potassium 4.0 3.5 - 5.0 mmol/L    Chloride 107 98 - 107 mmol/L    CO2 21 (L) 22 - 31 mmol/L    Anion Gap, Calculation 14 5 - 18 mmol/L    Glucose 94 70 - 125 mg/dL    BUN 28 (H) 8 - 22 mg/dL    Creatinine 0.81 0.60 - 1.10 mg/dL    GFR MDRD Af Amer >60 >60 mL/min/1.73m2    GFR MDRD Non Af Amer >60 >60 mL/min/1.73m2    Bilirubin, Total 0.9 0.0 - 1.0 mg/dL    Calcium 9.5 8.5 - 10.5 mg/dL    Protein, Total 6.6 6.0 - 8.0 g/dL    Albumin 3.8 3.5 - 5.0 g/dL    Alkaline Phosphatase 49 45 - 120 U/L    AST 18 0 - 40 U/L    ALT 12 0 - 45 U/L   Thyroid Cascade  "  Result Value Ref Range    TSH 2.23 0.30 - 5.00 uIU/mL   Lipid Cascade FASTING   Result Value Ref Range    Cholesterol 231 (H) <=199 mg/dL    Triglycerides 96 <=149 mg/dL    HDL Cholesterol 70 >=50 mg/dL    LDL Calculated 142 (H) <=129 mg/dL    Patient Fasting > 8hrs? Yes    HIV Antigen/Antibody Screening Cascade   Result Value Ref Range    HIV Antigen / Antibody Negative Negative   HM2(CBC w/o Differential)   Result Value Ref Range    WBC 6.9 4.0 - 11.0 thou/uL    RBC 4.61 3.80 - 5.40 mill/uL    Hemoglobin 14.4 12.0 - 16.0 g/dL    Hematocrit 42.8 35.0 - 47.0 %    MCV 93 80 - 100 fL    MCH 31.3 27.0 - 34.0 pg    MCHC 33.7 32.0 - 36.0 g/dL    RDW 11.4 11.0 - 14.5 %    Platelets 295 140 - 440 thou/uL    MPV 7.8 7.0 - 10.0 fL     I have had an Advance Directives discussion with the patient.  Objective:    /87   Pulse 82   Temp 97.3  F (36.3  C) (Oral)   Resp 16   Ht 5' 2.75\" (1.594 m)   Wt 169 lb 8 oz (76.9 kg)   BMI 30.27 kg/m        General Appearance:    Alert, cooperative, no distress, appears stated age   Head:    Normocephalic, without obvious abnormality, atraumatic   Eyes:    PERRL, conjunctiva/corneas clear, EOM's intact, fundi     benign, both eyes   Ears:    Normal TM's and external ear canals, both ears   Nose:   Nares normal, septum midline, mucosa normal, no drainage    or sinus tenderness   Throat:   Lips, mucosa, and tongue normal; teeth and gums normal   Neck:   Supple, symmetrical, trachea midline, no adenopathy;     thyroid:  no enlargement/tenderness/nodules; no carotid    bruit or JVD   Back:     Symmetric, no curvature, ROM normal, no CVA tenderness   Lungs:     Clear to auscultation bilaterally, respirations unlabored   Chest Wall:    No tenderness or deformity    Heart:    Regular rate and rhythm, S1 and S2 normal, no murmur, rub   or gallop   Breast Exam:    No tenderness, masses, or nipple abnormality   Abdomen:     Soft, non-tender, bowel sounds active all four quadrants,     no " masses, no organomegaly   Genitalia:    Normal female without lesion, discharge or tenderness       Extremities:   Extremities normal, atraumatic, no cyanosis or edema   Pulses:   2+ and symmetric all extremities   Skin:   Skin color, texture, turgor normal, no rashes or lesions   Lymph nodes:   Cervical, supraclavicular, and axillary nodes normal   Neurologic:   CNII-XII intact, normal strength, sensation and reflexes     throughout

## 2021-06-13 NOTE — PROGRESS NOTES
Assessment: Medication check for Phentermine  1. High risk medication use     2. Migraine without aura         Plan: BMI follow up with weight management, diet and exercise counseling.  Stop med and mian COOPER if any chest pain, palpations or shortness of breath. Follow-up in 1 month. This is month # 7 . Phentermine dose is 37.5 mg.    Patient Instructions   Follow up in 3-4 months.     Physical is scheduled for February 5, 2018 at 9:00 am.  Please arrive fasting at that appointment. Refrain from eating for 10-12 hours prior to your appointment.     Please schedule your colonoscopy.      Mammogram is due in February 2018. [phone]. Can be done at [facility] or [facility].      Chief Complaint   Patient presents with     Follow-up     f/u phentermine      Subjective: Carole NEVILLE Lety comes in today for a medication check for Phentermine. No chest pain, palpations or shortness of breath. They have been on the med for  6 months. They have lost 4 pounds in the last month and 14 pounds overall. They exercise 60 minutes 2 days a week. They are not journaling calories.   She notes that the phentermine sometimes causes her to have trouble sleeping.      Objective: /84 (Patient Site: Left Arm, Patient Position: Sitting, Cuff Size: Adult Large)  Pulse 84  Temp 98.4  F (36.9  C) (Oral)   Resp 16  Wt 169 lb 7 oz (76.9 kg)  BMI 30.25 kg/m2  General: No apparent distress  CV: Regular rate and rhythm without murmur  Lungs: Clear to auscultation bilaterally    General Appearance:    Alert, cooperative, no distress, appears stated age   Lungs:     Clear to auscultation bilaterally, respirations unlabored   Chest Wall:    No tenderness or deformity    Heart:    Regular rate and rhythm, S1 and S2 normal, no murmur, rub   or gallop     ADDITIONAL HISTORY SUMMARIZED (2): Reviewed mammogram 1/26/2017.   DECISION TO OBTAIN EXTRA INFORMATION (1): None.   RADIOLOGY TESTS (1): None  LABS (1): None  MEDICINE  TESTS (1): None  INDEPENDENT REVIEW (2 each): None.       The visit lasted a total of 30 minutes face to face with the patient. Over 50% of the time was spent counseling and educating the patient about health maintenance.      I, Page Bhakta, am scribing for and in the presence of, Dr. Minerva Turpin MD.      I, Dr. Minerva Lockhart MD, MD, personally performed the services described in this documentation, as scribed by Page Bhakta in my presence, and it is both accurate and complete.    Total Data Points:  2

## 2021-06-14 NOTE — PROGRESS NOTES
Assessment/Plan:        1. Sinobronchitis  Exam findings were discussed  Headaches, most likely due to sinus pressure / congestion,     Plan:   - azithromycin (ZITHROMAX Z-SHABNAM) 250 MG tablet; Take 500 mg (2 x 250 mg tablets) on day 1 followed by 250 mg (1 tablet) on days 2-5.  Dispense: 6 tablet; Refill: 0  - fluticasone (FLONASE) 50 mcg/actuation nasal spray; 2 sprays into each nostril 2 (two) times a day.  Dispense: 16 g; Refill: 0      Close follow up with any worsening or no significant improvement as anticipated.         Subjective:    Patient ID:   Carole Davidson is a 57 y.o. female comes in with having an frontal headache for over a month describing it as if having a migraine headache and been taking her migraine meds without help.  She says that is prone to get sinus infections and the headaches are in the sinuses and behind her eyes symptoms going down to the neck and feels different than her usual migraine headaches which are mainly perceived behind the eyes  She is also having ear pressure, with postnasal drip and sore throat and intermittent coughing is been going on for the past 5 days with a cough now settling in her lower chest.  She denies any fever chills no chest pain or shortness of breath       allergies, current medications, past medical history, past surgical history and problem list.    Review of Systems  A complete 10 point review of systems was obtained and is negative other than what is stated in the HPI.           Objective:   /80 (Patient Site: Right Arm, Patient Position: Sitting, Cuff Size: Adult Regular)  Pulse (!) 110  Temp 98.1  F (36.7  C) (Oral)   Wt 165 lb (74.8 kg)  SpO2 98%  BMI 29.46 kg/m2      Physical Exam    General Appearance:    Alert, cooperative, no distress   Head:    Normocephalic, Sinus: non tender     Eyes:    PERRL, conjunctiva/corneas clear, EOM's intact, both eyes   Ears:    Middle ear effusion bilaterally, nl canals.    Throat:   mucous membranes  moist, pharynx normal without lesions   Neck:   Supple, symmetrical, trachea midline, no adenopathy;     thyroid:  no enlargement/tenderness/nodules;    Lungs:     clear to auscultation, no wheezes, rales or rhonchi, symmetric air entry    Chest Wall:    No tenderness or deformity    Heart:    Regular rate and rhythm, S1 and S2 normal, no murmur, rub   or gallop   Skin:   Skin color, texture, turgor normal, no rashes or lesions

## 2021-06-14 NOTE — PROGRESS NOTES
Assessment/Plan:      Visit for Preoperative Exam.     Patient approved for surgery with general or local anesthesia. Low Risk Surgery.     Cardiac Risk estimate:  Very low risk 0.4%  Clinical risk factor:   Cardiac-no recent history of MI, valvular disease, CHF.   Metabolic/endocrine-no history of diabetes or CKD stage III or above   Neurologic-no history of CVA    Functional capacity: Can do heavy work around the house such as scrubbing floors or lifting or moving heavy furniture or climb two flights of stairs (between 4 and 10 METs).     Subjective:     Scheduled Procedure: bilateral eyelid surgery  Surgery Date:  12/28/2017  Surgery Location:  [facility]   Surgeon:  Dr Suresh    HPI: Patient is having eyelid surgery to remove the  excessive loose skin of the eyelid that is impeding her vision.  There is no history of domestic abuse.    Acute sinusitis: Patient has been taking azithromycin for the past 5 days for a acute sinusitis infection.  Last dose of azithromycin is today.  She states that her symptoms have resolved.    Current Outpatient Prescriptions   Medication Sig Dispense Refill     azithromycin (ZITHROMAX Z-SHABNAM) 250 MG tablet Take 500 mg (2 x 250 mg tablets) on day 1 followed by 250 mg (1 tablet) on days 2-5. 6 tablet 0     phentermine 37.5 MG capsule Take 1 capsule (37.5 mg total) by mouth daily. 30 capsule 2     valACYclovir (VALTREX) 500 MG tablet Take 1 tablet (500 mg total) by mouth 2 (two) times a day. 30 tablet 5     ZOLMitriptan (ZOMIG) 2.5 MG tablet Take 1 tab as needed for migraine, may repeat in 2 hours 12 tablet 5     No current facility-administered medications for this visit.        Allergies   Allergen Reactions     Dog Dander      Dust [Other Environmental Allergy]      Mold Extracts        Immunization History   Administered Date(s) Administered     Pneumo Polysac 23-V 01/01/2010     Td, adult adsorbed, PF 08/29/2005     Td,adult,historic,unspecified 08/29/2005     Tdap  12/06/2013       Patient Active Problem List   Diagnosis     Migraine without aura     Herpes dermatitis     Vitamin D deficiency     Nocturia     Adenomatous colon polyp     Dysplastic nevi     Hyperlipidemia     Urinary frequency     Snoring     Post-menopause     High risk medication use     History of adenomatous polyp of colon       No past medical history on file.    Social History     Social History     Marital status:      Spouse name: N/A     Number of children: N/A     Years of education: N/A     Occupational History     Not on file.     Social History Main Topics     Smoking status: Never Smoker     Smokeless tobacco: Never Used     Alcohol use Yes     Drug use: No     Sexual activity: Not on file     Other Topics Concern     Not on file     Social History Narrative       Past Surgical History:   Procedure Laterality Date     APPENDECTOMY  1978     BREAST EXCISIONAL BIOPSY Right 02/16/2017    DR. CORRAL     BUNIONECTOMY  Feb. 2015     KNEE SURGERY  1978       History of Present Illness  Recent Health  Fever: no  Chills: no  Fatigue: no  Chest Pain: no  Cough: no  Dyspnea: no  Urinary Frequency: yes  Nausea: no  Vomiting: no  Diarrhea: no  Abdominal Pain: no  Easy Bruising: no  Lower Extremity Swelling: no  Poor Exercise Tolerance: no    Most recent Health Maintenance Visit:  1 year(s) ago    Pertinent History  Prior Anesthesia: yes  Previous Anesthesia Reaction:  no  Diabetes: no  Cardiovascular Disease: no  Pulmonary Disease: no  Renal Disease: no  GI Disease: no  Sleep Apnea: no  Thromboembolic Problems: no  Clotting Disorder: no  Bleeding Disorder: no  Transfusion Reaction: no  Impaired Immunity: no  Steroid use in the last 6 months: no  Frequent Aspirin use: no    Family history of clotting disorder and bleeding disorder (oldest son-clotting disorder, son-ITP)    Social history of patient does not wear denture or partial plates, there is no transfusion refusal and there are no concerns  "regarding care after surgery    After surgery, the patient plans to recover at home with family.    Review of Systems  Review of Systems   Eyes:        Eye lid problem   All other systems reviewed and are negative.            Objective:         Vitals:    12/18/17 1054   BP: 130/86   Pulse: 82   Resp: 16   Temp: 97.6  F (36.4  C)   TempSrc: Oral   Weight: 164 lb (74.4 kg)   Height: 5' 2.99\" (1.6 m)       Physical Exam:  Physical Exam     General Appearance:  Alert, cooperative, no distress, appears stated age   Head:  Normocephalic, without obvious abnormality, atraumatic   Eyes:  PERRL, conjunctiva/corneas clear, EOM's intact   Ears:  Normal TM's and external ear canals, both ears   Nose: Nares normal, septum midline, mucosa normal, no drainage   Throat: Lips, mucosa, and tongue normal; teeth and gums normal   Neck: Supple, symmetrical, trachea midline, no adenopathy, thyroid: not enlarged, symmetric, no tenderness/mass/nodules   Back:   Symmetric, no curvature, ROM normal, no CVA tenderness   Lungs:   Clear to auscultation bilaterally, respirations unlabored   Chest Wall:  No tenderness or deformity   Heart:  Regular rate and rhythm, S1, S2 normal, no murmur, rub or gallop   Abdomen:   Soft, non-tender, bowel sounds active all four quadrants,  no masses, no organomegaly   Extremities: Extremities normal, atraumatic, no cyanosis or edema   Skin: Skin color, texture, turgor normal, no rashes or lesions   Lymph nodes: Cervical and supraclavicular normal   Neurologic: Normal,Coordinated, smooth gait, balance, rapid alternating movements, sensory functioning, and cranial nerves II-XII grossly intact. Not able to elicit DTRs       "

## 2021-06-15 PROBLEM — E78.5 HYPERLIPIDEMIA: Status: ACTIVE | Noted: 2017-01-26

## 2021-06-15 PROBLEM — Z78.0 POST-MENOPAUSE: Status: ACTIVE | Noted: 2017-01-26

## 2021-06-15 PROBLEM — R35.0 URINARY FREQUENCY: Status: ACTIVE | Noted: 2017-01-26

## 2021-06-15 PROBLEM — R06.83 SNORING: Status: ACTIVE | Noted: 2017-01-26

## 2021-06-15 PROBLEM — D23.9 DYSPLASTIC NEVI: Status: ACTIVE | Noted: 2017-01-26

## 2021-06-15 PROBLEM — D12.6 ADENOMATOUS COLON POLYP: Status: ACTIVE | Noted: 2017-01-26

## 2021-06-15 NOTE — PROGRESS NOTES
Assessment/Plan:  1. Routine general medical examination at a health care facility     2. Screen for colon cancer     3. Encounter for screening mammogram for malignant neoplasm of breast  Mammo Screening Bilateral   4. Dysplastic nevi  Ambulatory referral to Dermatology   5. Adenomatous colon polyp     6. Hyperlipidemia  Lipid Cascade    HM2(CBC w/o Differential)    Comprehensive Metabolic Panel   7. Migraine without aura  HM2(CBC w/o Differential)    Comprehensive Metabolic Panel   8. Family history of hypothyroidism  Thyroid Cascade   9. Vitamin D deficiency  Vitamin D, Total (25-Hydroxy)   10. Post-menopause  DXA Bone Density Scan   11. High risk medication use         Patient is a 57 y.o. female here for physical exam. See health maintenance section below. Labs as ordered.      If you wish, you can set nurse appointments for the hepatitis A and/or B vaccines.     Check with your insurance to see if Dr. Sharpe is still covered by your insurance. Referral given for full body skin check.    If you feel like your migraines are changing in any way, come see us and we might consider sending you to neurology.     Refilled phentermine at 37.5 mg daily.  Should limit her caffeine if she is having any problems sleeping at night.  Follow up for phentermine in 2-3 months.     Mammogram and DXA ordered.     Labs as ordered.     HPI    Chief Complaint   Patient presents with     Annual Exam     physical, no pap,pt is fasting        Health Maintenance   Topic Date Due     INFLUENZA VACCINE RULE BASED (1) Declined     MAMMOGRAM  Ordered     PAP SMEAR  Next year     ADVANCE DIRECTIVES DISCUSSED WITH PATIENT  Declined     COLONOSCOPY  01/15/2023     TD 18+ HE  12/06/2023     TDAP ADULT ONE TIME DOSE  Completed     DEXA scan-ordered     Patient Active Problem List   Diagnosis     Migraine without aura     Herpes dermatitis     Vitamin D deficiency     Nocturia     Adenomatous colon polyp     Dysplastic nevi     Hyperlipidemia      Urinary frequency     Snoring     Post-menopause     High risk medication use     History of adenomatous polyp of colon     Family history of hypothyroidism     Current Outpatient Prescriptions   Medication Sig     phentermine 37.5 MG capsule Take 1 capsule (37.5 mg total) by mouth daily.     ZOLMitriptan (ZOMIG) 2.5 MG tablet Take 1 tab as needed for migraine, may repeat in 2 hours     valACYclovir (VALTREX) 500 MG tablet Take 1 tablet (500 mg total) by mouth 2 (two) times a day.       Patient is a 57 y.o. female presents for a physical exam.    Med check for phentermine: She is currently using phentermine 37.5 mg prescribed by Dr. Lockhart. She was on vacation for 10 days and she ate and drank like crazy, so she assumes she gained weight; she gained 4 pounds in total on vacation which she knows because she weighed herself before she left and when she got back, but her weight is the same since her last office visit here. She just started getting back into exercise; her goal is 2 days per week for 1-1.5 hours. She has had no problems with phentermine other than maybe she doesn t sleep as well. She drinks about 10 ounces of coffee when she wakes up every morning. This is month number 9 of phentermine for her; she has been coming every 2-3 months. She hasn t been taking phentermine every day. She has lost about 19 pounds since she started taking phentermine.   Health Maintenance: She is fasting today; she had coffee.  She is wondering if she could have her thyroid checked as well; her mother had thyroid issues once she turned 60. She did not get a flu shot this year and she declines one today. She had a mammogram on 2/6/2017 and she needed an excisional biopsy after that which was done by Dr. Puga at [facility] and she notes it was a benign schwannoma. She does self-breast exams from time to time. She had a colonoscopy done last month, in January 2018 at [facility], and she was told to repeat in 5 years. She  has never had an abnormal Pap smear; her last Pap smear was done in September 2015, but she declines today as she is in a hurry. She doesn t have a living will or advanced directive, but she declines paperwork today. She declines the hepatitis A and B vaccines; she doesn t eat at restaurants often. She was recently in [facility] for 10 days. She has gone much longer than a year without a period. She had a DXA scan in 2015; she thinks it was at [facility]  Review of Systems:  Oral Herpes: She keeps Valtrex on hand to use as needed in case she gets a cold sore.  Migraines: She uses half a Zomig 2.5 mg as needed for migraines. She took it about 4-5 times in the past month, maybe more often than that, but that is typical for her. As soon as she feels a headache coming on or notices any of her typical signs, she takes half a Zomig, and then the headache never happens. She doesn t think her migraines have changed at all since starting phentermine; the issue she was having has resolved. She went to the chiropractor who recommended an estrogen cleanse, which was a 10 day cleanse requiring that she purchase a powder from that clinic, but when she started taking it, her migraines flared. Her migraines back in the day were tied to her menstrual cycle.   Dysplastic Nevi: She was seen by Dr. Sharpe for dysplastic nevi twice. She was first seen in 2015 and she went again after her last physical in 2017. She notes nothing concerning was found; she had a couple things removed.  She had fluid in her ears a couple months ago; she doesn t have symptoms right now. She denies any abuse, changes in sexual partners, concerns for STD s, postmenopausal bleeding, chest pain or SOB with or without exercise, or heart palpitations. All other systems are negative.   PFSH:  The following portions of the patient's history were reviewed and updated as appropriate: allergies, current medications, past family history, past medical history, past social  history, past surgical history and problem list.  Immunization History   Administered Date(s) Administered     Pneumo Polysac 23-V 01/01/2010     Td, adult adsorbed, PF 08/29/2005     Td,adult,historic,unspecified 08/29/2005     Tdap 12/06/2013     Recent Results (from the past 240 hour(s))   Lipid Cascade   Result Value Ref Range    Cholesterol 261 (H) <=199 mg/dL    Triglycerides 125 <=149 mg/dL    HDL Cholesterol 72 >=50 mg/dL    LDL Calculated 164 (H) <=129 mg/dL    Patient Fasting > 8hrs? Yes    Thyroid Cascade   Result Value Ref Range    TSH 2.58 0.30 - 5.00 uIU/mL   Vitamin D, Total (25-Hydroxy)   Result Value Ref Range    Vitamin D, Total (25-Hydroxy) 34.7 30.0 - 80.0 ng/mL   HM2(CBC w/o Differential)   Result Value Ref Range    WBC 8.2 4.0 - 11.0 thou/uL    RBC 4.61 3.80 - 5.40 mill/uL    Hemoglobin 13.9 12.0 - 16.0 g/dL    Hematocrit 42.3 35.0 - 47.0 %    MCV 92 80 - 100 fL    MCH 30.1 27.0 - 34.0 pg    MCHC 32.7 32.0 - 36.0 g/dL    RDW 12.8 11.0 - 14.5 %    Platelets 291 140 - 440 thou/uL    MPV 7.3 7.0 - 10.0 fL   Comprehensive Metabolic Panel   Result Value Ref Range    Sodium 142 136 - 145 mmol/L    Potassium 4.0 3.5 - 5.0 mmol/L    Chloride 106 98 - 107 mmol/L    CO2 24 22 - 31 mmol/L    Anion Gap, Calculation 12 5 - 18 mmol/L    Glucose 105 70 - 125 mg/dL    BUN 26 (H) 8 - 22 mg/dL    Creatinine 0.75 0.60 - 1.10 mg/dL    GFR MDRD Af Amer >60 >60 mL/min/1.73m2    GFR MDRD Non Af Amer >60 >60 mL/min/1.73m2    Bilirubin, Total 0.8 0.0 - 1.0 mg/dL    Calcium 9.3 8.5 - 10.5 mg/dL    Protein, Total 7.1 6.0 - 8.0 g/dL    Albumin 3.8 3.5 - 5.0 g/dL    Alkaline Phosphatase 56 45 - 120 U/L    AST 24 0 - 40 U/L    ALT 16 0 - 45 U/L     The following high BMI interventions were performed this visit: encouragement to exercise and dietary needs education  Objective:    /88 (Patient Site: Left Arm, Patient Position: Sitting, Cuff Size: Adult Large)  Pulse 76  Temp 97.8  F (36.6  C) (Oral)   Resp 16  Ht 5'  "3\" (1.6 m)  Wt 164 lb 9 oz (74.6 kg)  BMI 29.15 kg/m2      General Appearance:    Alert, cooperative, no distress, appears stated age   Head:    Normocephalic, without obvious abnormality, atraumatic   Eyes:    PERRL, conjunctiva/corneas clear, EOM's intact, fundi     benign, both eyes   Ears:    Normal TM's and external ear canals, both ears   Nose:   Nares normal, septum midline, mucosa normal, no drainage    or sinus tenderness   Throat:   Lips, mucosa, and tongue normal; teeth and gums normal   Neck:   Supple, symmetrical, trachea midline, no adenopathy;     thyroid:  no enlargement/tenderness/nodules; no carotid    bruit or JVD   Back:     Symmetric, no curvature, ROM normal, no CVA tenderness   Lungs:     Clear to auscultation bilaterally, respirations unlabored   Chest Wall:    No tenderness or deformity    Heart:    Regular rate and rhythm, S1 and S2 normal, no murmur, rub   or gallop   Breast Exam:    No tenderness, masses, or nipple abnormality   Abdomen:     Soft, non-tender, bowel sounds active all four quadrants,     no masses, no organomegaly   Genitalia:    Normal female without lesion, discharge or tenderness       Extremities:   Extremities normal, atraumatic, no cyanosis or edema   Pulses:   2+ and symmetric all extremities   Skin:   Skin color, texture, turgor normal, no rashes or lesions   Lymph nodes:   Cervical, supraclavicular, and axillary nodes normal   Neurologic:   CNII-XII intact, normal strength, sensation and reflexes     throughout        The visit lasted a total of 30 minutes face to face with the patient. Over 50% of the time was spent counseling and educating the patient about her chronic health conditions, health maintenance, diet, exercise, and medications.    IAisha, am scribing for and in the presence of Dr. Lockhart.  IDr. .Minerva Lockhart MD, personally performed the services described in this documentation as scribed by Aisha Pollack in my presence, and it is " both accurate and complete.

## 2021-06-16 PROBLEM — Z79.899 HIGH RISK MEDICATION USE: Status: ACTIVE | Noted: 2017-07-13

## 2021-06-16 PROBLEM — I10 BENIGN ESSENTIAL HYPERTENSION: Status: ACTIVE | Noted: 2021-03-29

## 2021-06-16 PROBLEM — Z86.0101 HISTORY OF ADENOMATOUS POLYP OF COLON: Status: ACTIVE | Noted: 2017-01-26

## 2021-06-16 PROBLEM — Z83.49 FAMILY HISTORY OF HYPOTHYROIDISM: Status: ACTIVE | Noted: 2018-02-05

## 2021-06-16 NOTE — PATIENT INSTRUCTIONS - HE
Since you had photosensitivity to hydrochlorothiazide I will have you stop that medication and I will add that to an allergy list.    We will start Triamterene 50 mg daily to help with blood pressure and fluid retention.    Please set a nurse appointment in 2 weeks for blood pressure check.  Also set a lab appointment in 2 weeks at the same time as your blood pressure check and I will order potassium.    This is a potassium sparing diuretic meaning it could raise potassium a little bit so do not overdo on potassium in your food or do not take a potassium supplement.  Then we will check your potassium twice a year.      Your target heart rate with exercise is about 126.  We want people to be able to talk but not sing.    Continue with your exercise but shoot for 6000-10,000 steps a day.    To resume tracking calories.    Bariatric dietician referral if you wish.    Restart Phentermine at 37.5 mg.    We will renew the controlled substance agreement and urine tox today.    Set med check in 3 months and physical in 6 months.    Zomig refill.    To see Dermatology for full body skin check.    Or[facility] colonoscopy at [facility] with a very strong family history of colon cancer.  And your family history of precancerous colon polyps.    Ordered a mammogram.  They should call you but if they do not please call [phone] to set it up.    Phone number to call to set up Covid vaccine is [phone].

## 2021-06-16 NOTE — PROGRESS NOTES
If she can tolerate the mild side effects, stay on the med and it they may wear off.    BP looks good, continue same meds and follow-up twice yearly, one for med check and one for physical.  Since the blood pressure check note states that if the blood pressure is normal to continue same medications and if this was already communicated to the patient no callback needed.

## 2021-06-16 NOTE — TELEPHONE ENCOUNTER
----- Message from Minerva Lockhart MD sent at 4/13/2021  9:46 AM CDT -----      ----- Message -----  From: Yesenia Cook CMA  Sent: 4/13/2021   8:21 AM CDT  To: Minerva Lockhart MD

## 2021-06-16 NOTE — TELEPHONE ENCOUNTER
Telephone Encounter by Parker Sanchez LPN at 7/31/2019  5:19 PM     Author: Parker Sanchez LPN Service: -- Author Type: Licensed Nurse    Filed: 7/31/2019  5:25 PM Encounter Date: 7/31/2019 Status: Signed    : Parker Sanchez LPN (Licensed Nurse)       Medication: Phentermine 37.5 mg    Last Date Filled 06/25/19     pulled: YES        Only PCP Prescribing?: prescribing provider and covering provider Viry Frausto NP.    Taken as prescribed from physician notes?: YES  Plan: The following high BMI interventions were performed this visit: encouragement to exercise, weight monitoring and dietary management education, guidance, and counseling  Stop med and mian MD if any chest pain, palpations or shortness of breath. Follow-up in 3 month.  Phentermine dose is 37.5 mg.     CSA in last year: YES    Random Utox in last year: YES    Opioids + benzodiazepines? NO

## 2021-06-16 NOTE — TELEPHONE ENCOUNTER
Fax from pharmacy stating valacyclovir is not covered. Alternative is Acyclovir is. Please change RX and send refill to pharmacy.

## 2021-06-16 NOTE — TELEPHONE ENCOUNTER
Reason contacted:  Bp check  Information relayed:  Informed patient of message below. Patient states understanding.   Additional questions:  No  Further follow-up needed:  No  Okay to leave a detailed message:  No         Minerva Lockhart MD at 4/13/2021  9:46 AM    Status: Signed      If she can tolerate the mild side effects, stay on the med and it they may wear off.     BP looks good, continue same meds and follow-up twice yearly, one for med check and one for physical.  Since the blood pressure check note states that if the blood pressure is normal to continue same medications and if this was already communicated to the patient no callback needed.

## 2021-06-16 NOTE — PROGRESS NOTES
Follow Up Blood Pressure Check    Carole Davidson is a 60 y.o. female recommended to follow up for blood pressure check by Minerva Lockhart MD. Anihypertensive medications and adherence were verified: Yes.     Reason for visit: Med change     Medication change at last visit:  Started Triamterene 50 mg daily, discontinued hydrochlorothiazide     Today's Vitals:   Vitals:    04/13/21 0819   BP: 120/82   Patient Site: Left Arm   Patient Position: Sitting   Cuff Size: Adult Large   Pulse: 74     Does feel a little more tired then normal with new medication. She is currently the medication in the morning.     Home blood pressure readings brought in today:   NO     Lowest blood pressure today is less than 140/90 and they deny signs or symptoms of new onset: severe headache, fatigue, confusion, vision changes, chest pain, pounding in the chest, neck, ears, irregular heartbeat, difficulty breathing and blood in the urine.  Please inform patient of his/her blood pressure today.  If they are asymptomatic, the patient is to continue current medications.  This message will be routed to their provider, and they will be notified if a change in medication is recommended.      Yesenia Cook    Current Outpatient Medications   Medication Sig Dispense Refill     acyclovir (ZOVIRAX) 400 MG tablet 1 tab 5 times a day for 4 to 5 days as needed for cold sore. 50 tablet 3     cholecalciferol, vitamin D3, 400 unit Chew Chew.       multivitamin therapeutic tablet Take 1 tablet by mouth daily.       phentermine 37.5 MG capsule Take 1 capsule (37.5 mg total) by mouth every morning. 30 capsule 2     triamterene (DYRENIUM) 50 MG capsule Take 1 capsule (50 mg total) by mouth daily. 90 capsule 3     ZOLMitriptan (ZOMIG) 2.5 MG tablet TAKE 1 TABLET BY MOUTH AS NEEDED FOR MIGRAINE, MAY REPEAT IN 2 HOURS 12 tablet 5     No current facility-administered medications for this visit.

## 2021-06-16 NOTE — PROGRESS NOTES
Assessment & Plan     Carole was seen today for medication management.    Diagnoses and all orders for this visit:    Benign essential hypertension  -     triamterene (DYRENIUM) 50 MG capsule; Take 1 capsule (50 mg total) by mouth daily.  -     Potassium    Herpes  -     valACYclovir (VALTREX) 500 MG tablet; TAKE 1 TABLET BY MOUTH TWICE DAILY UP TO 4-5 DAYS AS NEEDED    High risk medication use  -     phentermine 37.5 MG capsule; Take 1 capsule (37.5 mg total) by mouth every morning.  -     Drug Abuse 1+, Urine    Migraine without aura and without status migrainosus, not intractable  -     ZOLMitriptan (ZOMIG) 2.5 MG tablet; TAKE 1 TABLET BY MOUTH AS NEEDED FOR MIGRAINE, MAY REPEAT IN 2 HOURS    Family history of colon cancer  -     Ambulatory referral for Colonoscopy    Adenomatous polyp of colon, unspecified part of colon  -     Ambulatory referral for Colonoscopy    Controlled substance agreement signed-Phentermine 37.5 mg, max OF 30 PER 30 DAYS, CSA AND URINE TOX DONE 3-29-21    Dysplastic nevi    Visit for screening mammogram  -     Mammo Screening Bilateral; Future    Since you had photosensitivity to hydrochlorothiazide I will have you stop that medication and I will add that to an allergy list.    We will start Triamterene 50 mg daily to help with blood pressure and fluid retention.    Please set a nurse appointment in 2 weeks for blood pressure check.  Also set a lab appointment in 2 weeks at the same time as your blood pressure check and I will order potassium.    This is a potassium sparing diuretic meaning it could raise potassium a little bit so do not overdo on potassium in your food or do not take a potassium supplement.  Then we will check your potassium twice a year.      Your target heart rate with exercise is about 126.  We want people to be able to talk but not sing.    Continue with your exercise but shoot for 6000-10,000 steps a day.    To resume tracking calories.    Bariatric dietician  "referral if you wish.    Restart Phentermine at 37.5 mg.    We will renew the controlled substance agreement and urine tox today.    Set med check in 3 months and physical in 6 months.    Zomig refill.    To see Dermatology for full body skin check.    Or[facility] colon[facility] at the [facility] with a very strong family history of colon cancer.  And your family history of precancerous colon polyps.    Ordered a mammogram.  They should call you but if they do not please call [phone] to set it up.    Phone number to call to set up Covid vaccine is [phone].          35 minutes spent on the date of the encounter doing chart review, history and exam, documentation and further activities per the note       BMI:   Estimated body mass index is 31.87 kg/m  as calculated from the following:    Height as of 20: 5' 2.75\" (1.594 m).    Weight as of this encounter: 178 lb 8 oz (81 kg).   The following high BMI interventions were performed this visit: encouragement to exercise and weight monitoring    Return in about 3 months (around 2021).    Minerva Lockhart MD  [facility]   Carole CrowellDamir is 60 y.o. and presents today for the following health issues   HPI   Hypertension: She has been on hydrochlorothiazide but has a had a couple of hypersensitivity reactions to the sun.  She currently has a rash she was recently in [place].  She would like to change that medication but would like to stay on some type of diuretic as it is helped her ankle swelling.    Rash:  Last year and this year when in sun, had a rash.    Weight:  Off Phentermine for 6 months.  Lost 15 lb and gained 10 back.  30 min 4 times a week.  Bharati in [place] walking daily.  He would like to restart phentermine.    Family history of colon cancer: Mother had anal cancer, son  of colon cancer, also cousin  of colon cancer.  P. Uncle  of colon cancer.  She and I agree that she should have " colonoscopies every 3 years.              Objective    BP (!) 133/96 (Patient Site: Left Arm, Patient Position: Sitting, Cuff Size: Adult Regular)   Pulse 72   Temp 97  F (36.1  C) (Oral)   Resp 16   Wt 178 lb 8 oz (81 kg)   BMI 31.87 kg/m    Body mass index is 31.87 kg/m .  Physical Exam  Gen: No apparent distress  Heart: Regular rate and rhythm without murmur  Lungs: Clear to auscultation bilaterally

## 2021-06-17 NOTE — PROGRESS NOTES
Assessment: Medication check for Phentermine  1. High risk medication use         Plan: The following high BMI interventions were performed this visit: encouragement to exercise and weight monitoring  Stop med and notify MD if any chest pain, palpations or shortness of breath. Follow-up in 1 month. This is prescription # 11. Phentermine dose is 37.5 mg.    You have lost 21 pounds!      This is prescription #11 on phentermine 37.5mg.      Follow up in 3 months for med check.    Chief Complaint   Patient presents with     Medication Refill     phentermine      Subjective: Carole Davidson comes in today for a medication check for Phentermine. No chest pain, palpations or shortness of breath. They have filled 10 prescriptions of med. They have lost 2.3 pounds in the last month and 21 pounds overall. They exercise 40-75 minutes 3 days a week. On the other 7 days, she does activity around the house remodeling her house and cabin. She is not tracking calories, but is being cautious with food intake. She does not take medication consistently.       ROS: All other systems negative.     PFSH:   Social: [place] a [place] of [place].     History   Smoking Status     Former Smoker     Quit date: 1986   Smokeless Tobacco     Never Used         Objective: /82 (Patient Site: Right Arm, Patient Position: Sitting, Cuff Size: Adult Large)  Pulse 76  Temp 98.2  F (36.8  C) (Oral)   Resp 16  Wt 162 lb 2 oz (73.5 kg)  BMI 28.72 kg/m2  General: No apparent distress  CV: Regular rate and rhythm without murmur  Lungs: Clear to auscultation bilaterally    ADDITIONAL HISTORY SUMMARIZED (2): Reviewed notes from 10.63992. Follow up in 3 months.   DECISION TO OBTAIN EXTRA INFORMATION (1): None.   RADIOLOGY TESTS (1): None.  LABS (1): None.  MEDICINE TESTS (1): None.  INDEPENDENT REVIEW (2 each): None.     The visit lasted a total of 7 minutes face to face with the patient. Over 50% of the time was spent counseling and  educating the patient about phentermine check.    I, Deysi Lara, am scribing for and in the presence of, Dr. Minerva Lockhart.    I, Dr. Minerva Lockhart MD, personally performed the services described in this documentation, as scribed by Deysi Lara in my presence, and it is both accurate and complete.    Data Points: 2

## 2021-06-17 NOTE — PATIENT INSTRUCTIONS - HE
"Patient Instructions by Omkar Fernandez RN at 1/27/2020 10:40 AM     Author: Omkar Fernandez RN Service: -- Author Type: Registered Nurse    Filed: 1/27/2020 11:24 AM Encounter Date: 1/27/2020 Status: Addendum    : Omkar Fernandez RN (Registered Nurse)    Related Notes: Original Note by Omkar Fernandez RN (Registered Nurse) filed at 1/27/2020 11:24 AM       Patient Education   1/29 @12:45 check-in   CT Scan     During the test, relax and remain as still as you can.   CT scan is a test that combines X-rays and computer scans. The result is a detailed picture that can show problems with soft tissues (such as the lining of your sinuses), organs (such as your kidneys or lungs), blood vessels, and bones.  Tell the technologist   Be sure to tell the technologist if you:    Have allergies or kidney problems    Take diabetes medicine    Are pregnant or think you may be    Ate or drank anything before the test  Before your test    Be sure to tell your healthcare provider if you have ever had a reaction to contrast material (\"X-ray dye\"). If you have had a reaction, you may need to take medicine before your scan, so be sure to tell your provider ahead of time.     Be sure to mention the medicines you take. Ask if it's OK to take them before the test.     Follow any directions youre given for not eating or drinking before the procedure. Your provider will give you instructions if required. You may be required to drink contrast by mouth before arriving for the study depending on the type of exam you are having. Your provider or the imaging site will provide this for you.    The length of the procedure may vary, depending on your condition and your provider's practices.    Arrive on time to check in.    When you arrive, you may be asked to change into a hospital gown. Remove all metal near the part of your body that will be scanned, including jewelry, eyeglasses, and dentures. Women may need to remove any bra that has metal " underwire.     During your test    You may be given contrast through an IV (intravenous) line or by mouth.    You will lie on a table. The table slides into the CT scanner.    The technologist will ask you to hold your breath for a few seconds during your scan.    After your test    You can go back to your normal diet and activities right away. Any contrast will pass naturally through your body within a day.    Before leaving, you may need to wait briefly while your images are being reviewed. Your healthcare provider will discuss the test results with you during a follow-up appointment or over the phone.    Your next appointment is:__________________    Date Last Reviewed: 6/1/2019 2000-2019 [employer]. [address] [place] [address], [place] [address]. All rights reserved. This information is not intended as a substitute for professional medical care. Always follow your healthcare professional's instructions.

## 2021-06-18 NOTE — PATIENT INSTRUCTIONS - HE
Patient Instructions by Omkar Fernandez RN at 2/24/2020  8:20 AM     Author: Omkar Fernandez RN Service: -- Author Type: Registered Nurse    Filed: 2/24/2020  8:53 AM Encounter Date: 2/24/2020 Status: Addendum    : Omkar Fernandez RN (Registered Nurse)    Related Notes: Original Note by Omkar Fernandez RN (Registered Nurse) filed at 2/24/2020  8:48 AM       Patient Education     Exercises to Strengthen Your Lower Back  Strong lower back and abdominal muscles work together to support your spine. The exercises below will help strengthen the lower back. It is important that you begin exercising slowly and increase levels gradually.  Always begin any exercise program with stretching. If you feel pain while doing any of these exercises, stop and talk to your doctor about a more specific exercise program that better suits your condition.   Low back stretch  The point of stretching is to make you more flexible and increase your range of motion. Stretch only as much as you are able. Stretch slowly. Do not push your stretch to the limit. If at any point you feel pain while stretching, this is your (temporary) limit.    Lie on your back with your knees bent and both feet on the ground.    Slowly raise your left knee to your chest as you flatten your lower back against the floor. Hold for 5 seconds.    Relax and repeat the exercise with your right knee.    Do 10 of these exercises for each leg.    Repeat hugging both knees to your chest at the same time.  Building lower back strength  Start your exercise routine with 10 to 30 minutes a day, 1 to 3 times a day.  Initial exercises  Lying on your back:  1. Ankle pumps: Move your foot up and down, towards your head, and then away. Repeat 10 times with each foot.  2. Heel slides: Slowly bend your knee, drawing the heel of your foot towards you. Then slide your heel/foot from you, straightening your knee. Do not lift your foot off the floor (this is not a leg lift).  3. Abdominal contraction:  Bend your knees and put your hands on your stomach. Tighten your stomach muscles. Hold for 5 seconds, then relax. Repeat 10 times.  4. Straight leg raise: Bend one leg at the knee and keep the other leg straight. Tighten your stomach muscles. Slowly lift your straight leg 6 to 12 inches off the floor and hold for up to 5 seconds. Repeat 10 times on each side.  Standin. Wall squats: Stand with your back against the wall. Move your feet about 12 inches away from the wall. Tighten your stomach muscles, and slowly bend your knees until they are at about a 45 degree angle. Do not go down too far. Hold about 5 seconds. Then slowly return to your starting position. Repeat 10 times.  2. Heel raises: Stand facing the wall. Slowly raise the heels of your feet up and down, while keeping your toes on the floor. If you have trouble balancing, you can touch the wall with your hands. Repeat 10 times.  More advanced exercises  When you feel comfortable enough, try these exercises.  1. Kneeling lumbar extension: Begin on your hands and knees. At the same time, raise and straighten your right arm and left leg until they are parallel to the ground. Hold for 2 seconds and come back slowly to a starting position. Repeat with left arm and right leg, alternating 10 times.  2. Prone lumbar extension: Lie face down, arms extended overhead, palms on the floor. At the same time, raise your right arm and left leg as high as comfortably possible. Hold for 10 seconds and slowly return to start. Repeat with left arm and right leg, alternating 10 times. Gradually build up to 20 times. (Advanced: Repeat this exercise raising both arms and both legs a few inches off the floor at the same time. Hold for 5 seconds and release.)  3. Pelvic tilt: Lie on the floor on your back with your knees bent at 90 degrees. Your feet should be flat on the floor. Inhale, exhale, then slowly contract your abdominal muscles bringing your navel toward your spine.  Let your pelvis rock back until your lower back is flat on the floor. Hold for 10 seconds while breathing smoothly.  4. Abdominal crunch: Perform a pelvic tilt (above) flattening your lower back against the floor. Holding the tension in your abdominal muscles, take another breath and raise your shoulder blades off the ground (this is not a full sit-up). Keep your head in line with your body (dont bend your neck forward). Hold for 2 seconds, then slowly lower.  Date Last Reviewed: 6/1/2016 2000-2017 [employer]. [address] [place] [address]. All rights reserved. This information is not intended as a substitute for professional medical care. Always follow your healthcare professional's instructions.

## 2021-06-19 NOTE — PROGRESS NOTES
Assessment: Medication check for Phentermine  1. High risk medication use     2. Herpes  valACYclovir (VALTREX) 500 MG tablet   3. Migraine without aura     4. Herpes dermatitis     5. Contact dermatitis         Plan: The following high BMI interventions were performed this visit: encouragement to exercise and weight monitoring  Stop med and notify MD if any chest pain, palpations or shortness of breath. Follow-up in 1 month. This is month # 13 . Phentermine dose is 37.5 mg.    Patient Instructions   Lost 21 pounds overall!     Use triamcinolone 0.5 % max of 3 times a day for 2 weeks on tatto. Do not use on your face.     See Dr. Sharpe for full body skin check.     Complete mammogram.     Bone Density if able.     This is the 13 th prescription of phentermine 37.5 mg.     Follow up in 3 months if wishing to continue phentermine.              Chief Complaint   Patient presents with     Medication Management     phentermine     Arm Swelling     left forearm swelling at site of tatoo x 3 months      Subjective: Carole NEVILLE Lety comes in today for a medication check for Phentermine. No chest pain, palpations or shortness of breath. They have used 12 prescriptions of phentermine 37.5 mg. They have lost 2 pounds in the last month and 21 pounds overall. They exercise 20-30 minutes 2 days a week. She has been working 10-12 hours days and has not been able to complete other activities. She does not know if she will be able to work shorter days. During her work day, she sits for most of the day. 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 She has limited her work to 10 hours a day.     Rash: She has had left forearm swelling from orange ink from her tattoo. Area of orange ink is inflamed and she is inquiring if she can have a steroid cream.     ROS: Review of Systems - General ROS: negative      PFSH:   History   Smoking Status     Former Smoker     Quit date: 1986   Smokeless Tobacco      Never Used     Past Surgical History:   Procedure Laterality Date     APPENDECTOMY  1978     BELPHAROPTOSIS REPAIR Bilateral 12/28/2017    Dr. Kerwin Aly     BREAST EXCISIONAL BIOPSY Right 02/16/2017    DR. CORRAL     BUNIONECTOMY  Feb. 2015     KNEE SURGERY  1978     History reviewed. No pertinent past medical history.      Objective: BP (!) 132/96 (Patient Site: Right Arm, Patient Position: Sitting, Cuff Size: Adult Large) Comment: Manual  Pulse 79  Temp 96.8  F (36  C) (Oral)   Resp 16  Wt 161 lb 8 oz (73.3 kg)  BMI 28.61 kg/m2  General: No apparent distress  CV: Regular rate and rhythm without murmur  Lungs: Clear to auscultation bilaterally    ADDITIONAL HISTORY SUMMARIZED (2): Reviewed physical 02/05/2018.   DECISION TO OBTAIN EXTRA INFORMATION (1): None.   RADIOLOGY TESTS (1): None.  LABS (1): None.  MEDICINE TESTS (1): None.  INDEPENDENT REVIEW (2 each): None.     The visit lasted a total of 15 minutes face to face with the patient. Over 50% of the time was spent counseling and educating the patient about medication check and left forearm rash.    I, Deysi Lara, am scribing for and in the presence of, Dr. Minerva Lockhart.    I, Dr. Minerva Lockhart MD, personally performed the services described in this documentation, as scribed by Deysi Lara in my presence, and it is both accurate and complete.    Data Points: 2

## 2021-06-19 NOTE — LETTER
Letter by Viry Frausto FNP at      Author: Viry Frausto FNP Service: -- Author Type: --    Filed:  Encounter Date: 6/25/2019 Status: (Other)         [facility] MEDICINE/OB  06/25/19    Patient: Carole Davidson  YOB: 1960  Medical Record Number: [id number]  CSN: [id number]                                                                              Non-opioid Controlled Substance Agreement    I understand that my care provider has prescribed a controlled substance to help manage my condition(s). I am taking this medicine to help me function or work. I know this is strong medicine, and that it can cause serious side effects. Controlled substances can be sedating, addicting and may cause a dependency on the drug. They can affect my ability to drive or think, and cause depression. They need to be taken exactly as prescribed. Combining controlled substances with certain medicines or chemicals (such as cocaine, sedatives and tranquilizers, sleeping pills, meth) can be dangerous or even fatal. Also, if I stop controlled substances suddenly, I may have severe withdrawal symptoms.  If not helpful, I may be asked to stop them.    The risks, benefits, and side effects of these medicine(s) were explained to me. I agree that:    1. I will take part in other treatments as advised by my care team. This may be psychiatry or counseling, physical therapy, behavioral therapy, group treatment or a referral to a pain clinic. I will reduce or stop my medicine when my care team tells me to do so.  2. I will take my medicines as prescribed. I will not change the dose or schedule unless my care team tells me to. There will be no refills if I run out early.  I may be contactedwithout warning and asked to complete a urine drug test or pill count at any time.   3. I will keep all my appointments, and understand this is part of the monitoring of controlled substances. My care team may require an office visit for EVERY  controlled substance refill. If I miss appointments or dont follow instructions, my care team may stop my medicine.  4. I will not ask other providers to prescribe controlled substances, and I will not accept controlled substances from other people. If I need another prescribed controlled substance for a new reason, I will tell my care team within 1 business day.  5. I will use one pharmacy to fill all of my controlled substance prescriptions, and it is up to me to make sure that I do not run out of my medicines on weekends or holidays. If my care team is willing to refill my controlled substance prescription without a visit, I must request refills only during office hours, refills may take up to 3 days to process, and it may take up to 5 to 7 days for my medicine to be mailed and ready at my pharmacy. Prescriptions will not be mailed anywhere except my pharmacy.    6. I am responsible for my prescriptions. If the medicine/prescription is lost or stolen, it will not be replaced. I also agree not to share controlled substance medicines with anyone.          [facility] MEDICINE/OB  06/25/19  Patient:  Carole DuncanDamir  YOB: 1960  Medical Record Number: [id number]  CSN: [id number]    7. I agree to not use ANY illegal or recreational drugs. This includes marijuana, cocaine, bath salts or other drugs. I agree not to use alcohol unless my care team says I may. I agree to give urine samples whenever asked. If I dont give a urine sample, the care team may stop my medicine.    8. If I enroll in the [place] Medical Marijuana program, I will tell my care team. I will also sign an agreement to share my medical records with my care team.    9. I will bring in my list of medicines (or my medicine bottles) each time I come to the clinic.   10. I will tell my care team right away if I become pregnant or have a new medical problem treated outside of my regular clinic.  11. I understand that this medicine  can affect my thinking and judgment. It may be unsafe for me to drive, use machinery and do dangerous tasks. I will not do any of these things until I know how the medicine affects me. If my dose changes, I will wait to see how it affects me. I will contact my care team if I have concerns about medicine side effects.    I understand that if I do not follow any of the conditions above, my prescriptions or treatment may be stopped.      I agree that my provider, clinic care team, and pharmacy may work with any city, state or federal law enforcement agency that investigates the misuse, sale, or other diversion of my controlled medicine. I will allow my provider to discuss my care with or share a copy of this agreement with any other treating provider, pharmacy or emergency room where I receive care. I agree to give up (waive) any right of privacy or confidentiality with respect to these consents.   I have read this agreement and have asked questions about anything I did not understand.    ___________________________________________________________________________  Patient signature - Date/Time  -Carole Crowell-Damir                                      ___________________________________________________________________________  Witness signature                                                                    ___________________________________________________________________________  Provider signature- MARINA Noble

## 2021-06-20 NOTE — LETTER
Letter by Francisco Tavares MD at      Author: Francisco Tavares MD Service: -- Author Type: --    Filed:  Encounter Date: 1/27/2020 Status: (Other)         Minerva Lockhart MD  [address]  [facility] [address]                                  January 27, 2020    Patient: Carole Davidson   MR Number: [id number]   YOB: 1960   Date of Visit: 1/27/2020     Dear Dr. Richy MD:    Thank you for referring Carole Davidson to me for evaluation. Below are the relevant portions of my assessment and plan of care.    If you have questions, please do not hesitate to call me. I look forward to following Carole along with you.    Sincerely,        Francisco Tavares MD          CC  No Recipients  Francisco Tavares MD  1/27/2020 12:19 PM  Signed  Vascular Medicine Progress note    Dr Francisco Tavares MD, Vascular Medicine      Carole Davidson    Medical Record #:  [id number]    Date of Service: 01/27/2020     Date last seen:  Visit date not found    PRIMARY CARE PROVIDER: Minerva Lockhart MD      IMPRESSION/PLAN: Bilateral cold feet sensation with discoloration on leg dependency, patient is on no hormonal treatment, patient does not smoke, patient is having continuous pain affecting the feet and its mainly on the dorsum of the feet and when she walks gets worse if she sitting it is a little bit better but it is continuous all the time currently the feet are numb and tingly up to the midcalf patient had bunions and hammertoe surgeries on both feet 1 year ago and since that time she has been having pain and the pain is mainly radiating to both calves  Patient denies any history of claudication, no history of chest pain shortness of breath or palpitations  Patient had an SUKUMAR resting and post exercise done today with toe pressures the result looked completely normal with normal toe pressures and normal tracings  Patient has palpable DP and PT pulses bilaterally in addition the  "feet were not cold with normal capillary refill time    DISPOSITION:  1- CT lumbosacral spine to rule out any mechanical cause of radiculopathy affecting L5-S1 dermatomes  2- continue with compression treatment  3- check vitamin B12 and folic acid levels  4-we will see the patient once test results are available      ______________________________________________________________________    Subjective:    ALLERGIES:  Dog dander; Dust [other environmental allergy]; Mold extracts; and Topamax [topiramate]    MEDS:    Current Outpatient Medications:   ?  cholecalciferol, vitamin D3, 400 unit Chew, Chew., Disp: , Rfl:   ?  hydroCHLOROthiazide (MICROZIDE) 12.5 mg capsule, Take 1 capsule (12.5 mg total) by mouth daily., Disp: 90 capsule, Rfl: 3  ?  loratadine (CLARITIN) 10 mg tablet, Take 10 mg by mouth daily., Disp: , Rfl:   ?  multivitamin therapeutic tablet, Take 1 tablet by mouth daily., Disp: , Rfl:   ?  phentermine 37.5 MG capsule, Take 1 capsule (37.5 mg total) by mouth every morning., Disp: 30 capsule, Rfl: 2  ?  folic acid (FOLVITE) 1 MG tablet, Take 1 tablet (1 mg total) by mouth daily., Disp: 30 tablet, Rfl: 0  ?  valACYclovir (VALTREX) 500 MG tablet, TAKE 1 TABLET BY MOUTH TWICE DAILY UP TO 4-5 DAYS AS NEEDED, Disp: 30 tablet, Rfl: 2  ?  ZOLMitriptan (ZOMIG) 2.5 MG tablet, Take 1 tab as needed for migraine, may repeat in 2 hours, Disp: 12 tablet, Rfl: 5    REVIEW OF SYSTEMS:    A 12 point ROS was reviewed and except for what is listed in the HPI above, all others are negative    Objective:    PE:  BP (!) 128/94 (Patient Site: Right Arm)   Pulse 96   Temp 98.4  F (36.9  C)   Resp 18   Ht 5' 3\" (1.6 m)   Wt 170 lb (77.1 kg)   BMI 30.11 kg/m     Wt Readings from Last 1 Encounters:   01/27/20 170 lb (77.1 kg)     Body mass index is 30.11 kg/m .    EXAM:  GENERAL: This is a well-developed 59 y.o. female who appears her stated age  EYES: Grossly normal.  MOUTH: Buccal mucosa normal   CARDIAC:  Normal S1 and S2, " no Murmur  CHEST/LUNG:  Clear lung fields bilaterally  GASTROINTESINAL (ABDOMEN):Soft, non-tender, B/S present, no pulsatile mass     MUSCULOSKELETAL: Grossly normal and both lower extremities are intact.  HEME/LYMPH: No lymphedema  NEUROLOGIC: Focally intact, Alert and oriented x 3.   PSYCH: appropriate affect  INTEGUMENT: No open lesions or ulcers  Pulse Exam: Palpable  Circumferential measures:  No flowsheet data found.        DIAGNOSTIC STUDIES:     No results found.  I personally reviewed the following imaging today and those on care everywhere, if indicated    LABS:      Sodium   Date Value Ref Range Status   03/14/2019 142 136 - 145 mmol/L Final   02/05/2018 142 136 - 145 mmol/L Final   01/26/2017 144 136 - 145 mmol/L Final     Potassium   Date Value Ref Range Status   03/14/2019 4.0 3.5 - 5.0 mmol/L Final   02/05/2018 4.0 3.5 - 5.0 mmol/L Final   01/26/2017 4.0 3.5 - 5.0 mmol/L Final     Chloride   Date Value Ref Range Status   03/14/2019 108 (H) 98 - 107 mmol/L Final   02/05/2018 106 98 - 107 mmol/L Final   01/26/2017 107 98 - 107 mmol/L Final     BUN   Date Value Ref Range Status   03/14/2019 23 (H) 8 - 22 mg/dL Final   02/05/2018 26 (H) 8 - 22 mg/dL Final   01/26/2017 21 8 - 22 mg/dL Final     Creatinine   Date Value Ref Range Status   03/14/2019 0.79 0.60 - 1.10 mg/dL Final   02/05/2018 0.75 0.60 - 1.10 mg/dL Final   01/26/2017 0.84 0.60 - 1.10 mg/dL Final     Hemoglobin   Date Value Ref Range Status   03/14/2019 13.6 12.0 - 16.0 g/dL Final   12/27/2018 14.3 12.0 - 16.0 g/dL Final   02/05/2018 13.9 12.0 - 16.0 g/dL Final     Platelets   Date Value Ref Range Status   03/14/2019 275 140 - 440 thou/uL Final   02/05/2018 291 140 - 440 thou/uL Final   01/26/2017 267 140 - 440 thou/uL Final     BNP   Date Value Ref Range Status   09/30/2015 <10 0 - 84 pg/mL Final       Total time spent 25 (15,25,35) minutes face to face with patient with more than 50% time spent in counseling and coordination of  care.    Francisco Tavares MD  VASCULAR MEDICINE

## 2021-06-20 NOTE — LETTER
Letter by Minerva Lockhart MD at      Author: Minerva Lockhart MD Service: -- Author Type: --    Filed:  Encounter Date: 9/2/2020 Status: (Other)         [facility] MEDICINE/OB  09/02/20    Patient: Carole Davidson  YOB: 1960  Medical Record Number: [id number]  CSN: [id number]                                                                              Non-opioid Controlled Substance Agreement    I understand that my care provider has prescribed a controlled substance to help manage my condition(s). I am taking this medicine to help me function or work. I know this is strong medicine, and that it can cause serious side effects. Controlled substances can be sedating, addicting and may cause a dependency on the drug. They can affect my ability to drive or think, and cause depression. They need to be taken exactly as prescribed. Combining controlled substances with certain medicines or chemicals (such as cocaine, sedatives and tranquilizers, sleeping pills, meth) can be dangerous or even fatal. Also, if I stop controlled substances suddenly, I may have severe withdrawal symptoms.  If not helpful, I may be asked to stop them.    The risks, benefits, and side effects of these medicine(s) were explained to me. I agree that:    1. I will take part in other treatments as advised by my care team. This may be psychiatry or counseling, physical therapy, behavioral therapy, group treatment or a referral to a pain clinic. I will reduce or stop my medicine when my care team tells me to do so.  2. I will take my medicines as prescribed. I will not change the dose or schedule unless my care team tells me to. There will be no refills if I run out early.  I may be contactedwithout warning and asked to complete a urine drug test or pill count at any time.   3. I will keep all my appointments, and understand this is part of the monitoring of controlled substances. My care team may require an office  visit for EVERY controlled substance refill. If I miss appointments or dont follow instructions, my care team may stop my medicine.  4. I will not ask other providers to prescribe controlled substances, and I will not accept controlled substances from other people. If I need another prescribed controlled substance for a new reason, I will tell my care team within 1 business day.  5. I will use one pharmacy to fill all of my controlled substance prescriptions, and it is up to me to make sure that I do not run out of my medicines on weekends or holidays. If my care team is willing to refill my controlled substance prescription without a visit, I must request refills only during office hours, refills may take up to 3 days to process, and it may take up to 5 to 7 days for my medicine to be mailed and ready at my pharmacy. Prescriptions will not be mailed anywhere except my pharmacy.    6. I am responsible for my prescriptions. If the medicine/prescription is lost or stolen, it will not be replaced. I also agree not to share controlled substance medicines with anyone.          [facility] FAM[facility] MEDICINE/OB  09/02/20  Patient:  Carole Davidson  YOB: 1960  Medical Record Number: [id number]  CSN: [id number]    7. I agree to not use ANY illegal or recreational drugs. This includes marijuana, cocaine, bath salts or other drugs. I agree not to use alcohol unless my care team says I may. I agree to give urine samples whenever asked. If I dont give a urine sample, the care team may stop my medicine.    8. If I enroll in the [place] Medical Marijuana program, I will tell my care team. I will also sign an agreement to share my medical records with my care team.    9. I will bring in my list of medicines (or my medicine bottles) each time I come to the clinic.   10. I will tell my care team right away if I become pregnant or have a new medical problem treated outside of my regular clinic.  11. I understand that  this medicine can affect my thinking and judgment. It may be unsafe for me to drive, use machinery and do dangerous tasks. I will not do any of these things until I know how the medicine affects me. If my dose changes, I will wait to see how it affects me. I will contact my care team if I have concerns about medicine side effects.    I understand that if I do not follow any of the conditions above, my prescriptions or treatment may be stopped.      I agree that my provider, clinic care team, and pharmacy may work with any city, state or federal law enforcement agency that investigates the misuse, sale, or other diversion of my controlled medicine. I will allow my provider to discuss my care with or share a copy of this agreement with any other treating provider, pharmacy or emergency room where I receive care. I agree to give up (waive) any right of privacy or confidentiality with respect to these consents.   I have read this agreement and have asked questions about anything I did not understand.    ___________________________________________________________________________  Patient signature - Date/Time  -Carole Crowell-Damir                                      ___________________________________________________________________________  Witness signature                                                                    ___________________________________________________________________________  Provider signature- Minerva Lockhart MD

## 2021-06-21 NOTE — LETTER
Letter by Minerva Lockhart MD at      Author: Minerva Lockhart MD Service: -- Author Type: --    Filed:  Encounter Date: 3/29/2021 Status: (Other)       Non-Opioid Controlled Substance Agreement    This is an agreement between you and your provider regarding safe and appropriate controlled substance prescribing.? Controlled substances are?medicines that can cause physical and mental dependence. The manufacturing, possession and use of these medicines are regulated by law.  We here at [facility] are making a commitment to work with you in your efforts to get better.? To support you in this work, we will help you schedule regular appointments for medicine refills. If we must cancel or change your appointment for any reason, we will make sure you have enough medication to last until your next appointment.      As a Provider, I will:     Listen carefully to your concerns while treating you with dignity.     Recommend a treatment plan that I believe is in your best interest and may involve therapies other than medication.      Review the chance of benefit and the chance of harm of this medicine with you regularly and evaluate the safety and effectiveness of this therapy.       Provide a plan on how to discontinue if the decision is made to stop this medicine.       As a Patient, I understand controlled substances:       Are prescribed by my care provider to help me function or work and manage my condition(s).?    Are strong medicines and can cause serious side effects such as:     Drowsiness, which can seriously affect my driving ability    A lower breathing rate, enough to cause death    Harm to my thinking ability     Depression     Abuse of and addiction to this medicine.      Need to be taken exactly as prescribed.?Combining controlled substances with certain medicines or chemicals (such as illegal drugs, opioids, sedatives, sleeping pills, and benzodiazepines) can be dangerous or even fatal.? If I  stop taking my medicines suddenly, I may have severe withdrawal symptoms.     The risks, benefits, and side effects of these medicine(s) were explained to me. I agree that:    1. I will take part in other treatments as advised by my care team. This may be psychiatry or counseling, physical therapy, behavioral therapy, group treatment or a referral to specialist.    2. I will keep all my appointments and understand this is part of the monitoring of controlled substance.?My care team may require an office visit for EVERY controlled substance refill. If I miss appointments or dont follow instructions, my care team may stop my medicine    3. I will take my medicines as prescribed. I will not change the dose or schedule unless my care team tells me to. There will be no refills if I run out early.      4. I may be contactedwithout warning and asked to complete a urine drug test or pill count at any time. If I dont give a urine sample or participate in a pill count, the care team may stop my medicine.    5. I will only receive controlled substance prescriptions from this clinic. If treated by another provider, I will let them know I am taking controlled substances and that I have a treatment agreement with this provider. I will inform this care team within one business day if I am given a prescription for any controlled substance by another healthcare provider. This care team may contact other providers and pharmacists about my use of the medicines.     6. It is up to me to make sure that I do not run out of my medicines on weekends or holidays.?If my care team is willing to refill my prescription without a visit, I must request refills only during office hours. Refills may take up to 3 business days to process.  I will use one pharmacy to fill all my controlled substance prescriptions.  I will notify the clinic if any changes are made due to insurance changes or medication availability.    7. I am responsible for my  prescriptions. If the medicine/prescription is lost, stolen or destroyed, it will not be replaced.?I also agree not to share controlled substance medicines with anyone.     8. I am aware I should not use any illegal or recreational drugs. I agree not to drink alcohol unless my care team says I can.     9. If I enroll in the [place] Medical Cannabis program, I will tell my care team.?    10. I will tell my care team right away if I become pregnant, have a new medical problem treated outside of my regular clinic, or have a change in my medications.     11. I understand that this medicine can affect my thinking, judgment and reaction time.? Alcohol and drugs affect the brain and body, which can affect the safety of a persons driving. Being under the influence of alcohol or drugs can affect a persons decision-making, behaviors, personal safety, and the safety of others. Driving while impaired (DWI) can occur if a person is driving, operating, or in physical control of a car, motorcycle, boat, snowmobile, ATV, motorbike, off-road vehicle, or any other motor vehicle ([place] Statute 169A.20). I understand the risk if I choose to drive or operate machinery  I understand that if I do not follow any of the conditions above, my prescriptions or treatment may be stopped or changed.     I agree that my provider, clinic care team, and pharmacy may work with any city, state or federal law enforcement agency that investigates the misuse, sale, or other diversion of my controlled medicine. I will allow my provider to discuss my care with or share a copy of this agreement with any other treating provider, pharmacy or emergency room where I receive care.?    I have read this agreement and have asked questions about anything I did not understand.    ________________________________________________________  Patient Signature - Carole Crowell-Damir     ___________________                   Date      ________________________________________________________  Provider Signature - Minerva A Richy, MD       ___________________                   Date     ________________________________________________________  Witness Signature (required if provider not present while patient signing)          ___________________                   Date

## 2021-06-22 NOTE — PROGRESS NOTES
Preoperative Exam    Scheduled Procedure: Bilateral feet bunion surgery   Surgery Date:  1/14/2019  Surgery Location: [facility] [phone]  Surgeon:  Dr. Meeks     Assessment/Plan:     1. Preop general physical exam  - Hemoglobin          Surgical Procedure Risk: Low (reported cardiac risk generally < 1%)  Have you had prior anesthesia?: Yes  Have you or any family members had a previous anesthesia reaction:  No  Do you or any family members have a history of a clotting or bleeding disorder?: Yes: son has Protein S prone to clots, son has ITP. Has had testing and negative for patient.   Cardiac Risk Assessment: no increased risk for major cardiac complications    Patient approved for surgery with general or local anesthesia.        Functional Status: Independent  Patient plans to recover at home with family.     Subjective:      Carole Davidson is a 58 y.o. female who presents for a preoperative consultation.  Patient is healthy with chronic condition of migraine, hyperlipidemia, here for preoperative exam today. She is having bilateral bunion surgery, previously had right done but has had problems with it. She has done well with surgery in the past. No problem with anesthesia or bleeding. Has had previous workup for clotting due to family history. All negative. Able to perform up to 10 METS. Good exercise tolerance. No recent or remote SOB, chest pain, chest pressure, palpitations. No recent illness, fever chills, cold cough symptoms.     All other systems reviewed and are negative, other than those listed in the HPI.    Pertinent History  Do you have difficulty breathing or chest pain after walking up a flight of stairs: No  History of obstructive sleep apnea: No  Steroid use in the last 6 months: No  Frequent Aspirin/NSAID use: Yes: takes Advil regularly  Prior Blood Transfusion: No  Prior Blood Transfusion Reaction: No  If for some reason prior to, during or after the procedure, if it is  medically indicated, would you be willing to have a blood transfusion?:  There is no transfusion refusal.    Current Outpatient Medications   Medication Sig Dispense Refill     cholecalciferol, vitamin D3, 400 unit Chew Chew.       multivitamin therapeutic tablet Take 1 tablet by mouth daily.       valACYclovir (VALTREX) 500 MG tablet TAKE 1 TABLET BY MOUTH TWICE DAILY UP TO 4-5 DAYS AS NEEDED 30 tablet 2     ZOLMitriptan (ZOMIG) 2.5 MG tablet Take 1 tab as needed for migraine, may repeat in 2 hours 12 tablet 5     No current facility-administered medications for this visit.         Allergies   Allergen Reactions     Dog Dander      Dust [Other Environmental Allergy]      Mold Extracts        Patient Active Problem List   Diagnosis     Migraine without aura     Herpes dermatitis     Vitamin D deficiency     Nocturia     Adenomatous colon polyp     Dysplastic nevi     Hyperlipidemia     Urinary frequency     Snoring     Post-menopause     High risk medication use     History of adenomatous polyp of colon     Family history of hypothyroidism       No past medical history on file.    Past Surgical History:   Procedure Laterality Date     APPENDECTOMY  1978     BELPHAROPTOSIS REPAIR Bilateral 12/28/2017    Dr. Kerwin Aly     BREAST EXCISIONAL BIOPSY Right 02/16/2017    DR. CORRAL     BUNIONECTOMY  Feb. 2015     KNEE SURGERY  1978       Social History     Socioeconomic History     Marital status:      Spouse name: Not on file     Number of children: 1     Years of education: Not on file     Highest education level: Not on file   Social Needs     Financial resource strain: Not on file     Food insecurity - worry: Not on file     Food insecurity - inability: Not on file     Transportation needs - medical: Not on file     Transportation needs - non-medical: Not on file   Occupational History     Not on file   Tobacco Use     Smoking status: Former Smoker     Last attempt to quit: 1986     Years  "since quittin.0     Smokeless tobacco: Never Used   Substance and Sexual Activity     Alcohol use: Yes     Alcohol/week: 1.2 oz     Types: 2 Standard drinks or equivalent per week     Drug use: No     Sexual activity: Not on file   Other Topics Concern     Not on file   Social History Narrative     Not on file       Patient Care Team:  Minerva Lockhart MD as PCP - General (Family Medicine)          Objective:     Vitals:    18 1556 18 1643   BP: (!) 139/98 136/84   Pulse: (!) 111 94   Resp: 16    Temp: 98.2  F (36.8  C)    TempSrc: Oral    Weight: 167 lb 6 oz (75.9 kg)    Height: 5' 3\" (1.6 m)          Physical Exam:   /84 (Patient Site: Right Arm, Patient Position: Sitting, Cuff Size: Adult Regular) Comment: manual  Pulse 94   Temp 98.2  F (36.8  C) (Oral)   Resp 16   Ht 5' 3\" (1.6 m)   Wt 167 lb 6 oz (75.9 kg)   BMI 29.65 kg/m    General appearance: alert, appears stated age and cooperative  Head: Normocephalic, without obvious abnormality, atraumatic  Eyes: conjunctivae/corneas clear. PERRL, EOM's intact. Fundi benign.  Ears: normal TM's and external ear canals both ears  Nose: Nares normal. Septum midline. Mucosa normal. No drainage or sinus tenderness.  Throat: lips, mucosa, and tongue normal; teeth and gums normal  Neck: no adenopathy, no carotid bruit, no JVD, supple, symmetrical, trachea midline and thyroid not enlarged, symmetric, no tenderness/mass/nodules  Lungs: clear to auscultation bilaterally  Heart: regular rate and rhythm, S1, S2 normal, no murmur, click, rub or gallop  Extremities: extremities normal, atraumatic, no cyanosis or edema  Pulses: 2+ and symmetric  Neurologic: Grossly normal      There are no Patient Instructions on file for this visit.        Labs:  Recent Results (from the past 48 hour(s))   Hemoglobin    Collection Time: 18  4:44 PM   Result Value Ref Range    Hemoglobin 14.3 12.0 - 16.0 g/dL        Immunization History   Administered Date(s) " Administered     Pneumo Polysac 23-V 01/01/2010     Td, adult adsorbed, PF 08/29/2005     Td,adult,historic,unspecified 08/29/2005     Tdap 12/06/2013           Electronically signed by MARINA Noble 12/27/18 3:58 PM

## 2021-06-24 NOTE — PROGRESS NOTES
Assessment/Plan:    Patient is a 58 y.o. female here for physical exam. See health maintenance section below. Labs as ordered.      1. Routine general medical examination at a health care facility     2. Herpes  valACYclovir (VALTREX) 500 MG tablet   3. Visit for screening mammogram  CANCELED: Mammo Screening Bilateral   4. Menopause  DXA Bone Density Scan   5. Mixed hyperlipidemia  Lipid [place]    Comprehensive Metabolic Panel    HM2(CBC w/o Differential)   6. Vitamin D deficiency  Vitamin D, Total (25-Hydroxy)   7. Adenomatous polyp of colon, unspecified part of colon     8. Dysplastic nevi  Ambulatory referral to Dermatology   9. Family history of hypothyroidism  Thyroid [place]   10. Encounter for screening mammogram for malignant neoplasm of breast     11. Breast pain  Mammo Diagnostic Bilateral    US Breast Limited (Focal) Bilateral   12. High risk medication use         Diagnotic mammogram and ultrasound ordered today.  PAP next year at your next wellness physical.   Colonoscopy due in 2023 but sooner if symptoms.   Declined hepatitis A and B shots.   Dermatology consult to Dr. Sharpe for a full body skin check.  Start 30 mg phentermine a day. Follow up for a med check in 3 months.   Please ask your foot surgeon when it is okay to exercise, if cannot do weightbearing exercise, ask non-weight bearing exercises.   For ear congestion, you can use Flonase 2 sprays each nostril daily for 1 month.   Dexa scan ordered.  Overlooked controlled substance agreement and urine tox today.  We will do that at the 3-month of phentermine follow-up.  .     HPI    Chief Complaint   Patient presents with     Annual Exam     pt is fasting- no pap       Health Maintenance   Topic Date Due     ZOSTER VACCINES (1 of 2) 10/04/2008     INFLUENZA VACCINE RULE BASED (1) 08/01/2018     MAMMOGRAM  02/06/2019     PAP SMEAR  09/30/2020     COLONOSCOPY  01/15/2023     TD 18+ HE  12/06/2023     ADVANCE DIRECTIVES DISCUSSED WITH PATIENT   03/14/2024     TDAP ADULT ONE TIME DOSE  Completed        Patient Active Problem List   Diagnosis     Migraine without aura     Herpes dermatitis     Vitamin D deficiency     Nocturia     Adenomatous colon polyp     Dysplastic nevi     Hyperlipidemia     Urinary frequency     Snoring     Post-menopause     High risk medication use     History of adenomatous polyp of colon     Family history of hypothyroidism     Current Outpatient Medications   Medication Sig     cholecalciferol, vitamin D3, 400 unit Chew Chew.     multivitamin therapeutic tablet Take 1 tablet by mouth daily.     potassium 99 mg Tab Take by mouth.     phentermine 30 MG capsule Take 1 capsule (30 mg total) by mouth every morning.     valACYclovir (VALTREX) 500 MG tablet TAKE 1 TABLET BY MOUTH TWICE DAILY UP TO 4-5 DAYS AS NEEDED     ZOLMitriptan (ZOMIG) 2.5 MG tablet Take 1 tab as needed for migraine, may repeat in 2 hours       Patient is a 58 y.o. female presents for a physical exam. The patient reports that she has put on weight since being off phentermine. She states that she has had some pain and congestion in her left ear. She also notes some pain on her right lateral breast near her armpit. She states that her LNMP was about 7 years ago. She denies postmenopausal bleeding or chest pain with exercise.    The following portions of the patient's history were reviewed and updated as appropriate: allergies, current medications, past family history, past medical history, past social history, past surgical history and problem list.    Review of Systems  Positive for: Weight gain, left ear pain, right breast pain  Denies: vaginal bleeding, chest pain    PMFS  Nonsmoker  Currently unable to exercise due to recent foot surgery.     Immunization History   Administered Date(s) Administered     Pneumo Polysac 23-V 01/01/2010     Td, adult adsorbed, PF 08/29/2005     Td,adult,historic,unspecified 08/29/2005     Tdap 12/06/2013     Recent Results (from the  "past 240 hour(s))   HM2(CBC w/o Differential)   Result Value Ref Range    WBC 6.8 4.0 - 11.0 thou/uL    RBC 4.36 3.80 - 5.40 mill/uL    Hemoglobin 13.6 12.0 - 16.0 g/dL    Hematocrit 40.3 35.0 - 47.0 %    MCV 92 80 - 100 fL    MCH 31.2 27.0 - 34.0 pg    MCHC 33.8 32.0 - 36.0 g/dL    RDW 12.0 11.0 - 14.5 %    Platelets 275 140 - 440 thou/uL    MPV 7.9 7.0 - 10.0 fL     I have had an Advance Directives discussion with the patient.  The following high BMI interventions were performed this visit: encouragement to exercise and lifestyle education regarding diet  Objective:    BP (!) 120/98 (Patient Site: Right Arm, Patient Position: Sitting, Cuff Size: Adult Regular) Comment: [place]  Pulse 87   Temp 98.4  F (36.9  C) (Oral)   Resp 16   Ht 5' 2.75\" (1.594 m)   Wt 169 lb (76.7 kg)   BMI 30.18 kg/m        General Appearance:    Alert, cooperative, no distress, appears stated age   Head:    Normocephalic, without obvious abnormality, atraumatic   Eyes:    PERRL, conjunctiva/corneas clear, EOM's intact, fundi     benign, both eyes   Ears:    Normal TM's and external ear canals, both ears   Nose:   Nares normal, septum midline, mucosa normal, no drainage    or sinus tenderness   Throat:   Lips, mucosa, and tongue normal; teeth and gums normal   Neck:   Supple, symmetrical, trachea midline, no adenopathy;     thyroid:  no enlargement/tenderness/nodules; no carotid    bruit or JVD   Back:     Symmetric, no curvature, ROM normal, no CVA tenderness   Lungs:     Clear to auscultation bilaterally, respirations unlabored   Chest Wall:    No tenderness or deformity    Heart:    Regular rate and rhythm, S1 and S2 normal, no murmur, rub   or gallop   Breast Exam:    Tenderness to the right and left lateral breast, no masses, or nipple abnormality   Abdomen:     Soft, non-tender, bowel sounds active all four quadrants,     no masses, no organomegaly           Extremities:   Extremities normal, atraumatic, no cyanosis or edema "   Pulses:   2+ and symmetric all extremities   Skin:   Skin color, texture, turgor normal, no rashes or lesions   Lymph nodes:   Cervical, supraclavicular, and axillary nodes normal   Neurologic:   CNII-XII intact, normal strength, sensation and reflexes     throughout        ADDITIONAL HISTORY SUMMARIZED (2): 3/10/17 dermatology note reviewed regarding skin lesions.  DECISION TO OBTAIN EXTRA INFORMATION (1): None.   RADIOLOGY TESTS (1): None.  LABS (1): 2/05/18 labs reviewed. Labs ordered today.   MEDICINE TESTS (1): None.  INDEPENDENT REVIEW (2 each): None.       The visit lasted a total of 31 minutes face to face with the patient. Over 50% of the time was spent counseling and educating the patient about weight management and general wellness.    INita, am scribing for and in the presence of, Dr. Lockhart at  3/14/19 . 11:00am    IDr. Lockhart, personally performed the services described in this documentation, as scribed by Nita Davila in my presence, and it is both accurate and complete.    Total data points: 3

## 2021-06-24 NOTE — PATIENT INSTRUCTIONS - HE
Diagnotic mammogram and ultrasound ordered today.    PAP next year at your next wellness physical.     Colonoscopy due in 2023 but sooner if symptoms.     Declined hepatitis A and B shots.     Dermatology consult to Dr. Sharpe for a full body skin check.     Start 30 mg phentermine a day. Follow up for a med check in 3 months.     Please ask your foot surgeon when it is okay to exercise, if cannot do weightbearing exercise, ask non-weight bearing exercises.     For ear congestion, you can use Flonase 2 sprays each nostril daily for 1 month.     Health Maintenance   Topic Date Due     ZOSTER VACCINES (1 of 2) If you are interested in the new shingles shot, Shingrix, please check with insurance for coverage either in clinic or at a pharmacy. It is a 2 shot series 2-6 months apart.      INFLUENZA VACCINE RULE BASED (1) Declined     MAMMOGRAM  Ordered     PAP SMEAR  Next year     ADVANCE DIRECTIVES DISCUSSED WITH PATIENT  Declined     COLONOSCOPY  01/15/2023, sooner if symptoms     TD 18+ HE  12/06/2023     TDAP ADULT ONE TIME DOSE  Completed     Dexa scan-Ordered

## 2021-06-28 ENCOUNTER — OFFICE VISIT - HEALTHEAST (OUTPATIENT)
Dept: FAMILY MEDICINE | Facility: CLINIC | Age: 61
End: 2021-06-28

## 2021-06-28 DIAGNOSIS — B00.89 HERPES DERMATITIS: ICD-10-CM

## 2021-06-28 DIAGNOSIS — Z79.899 HIGH RISK MEDICATION USE: ICD-10-CM

## 2021-06-28 DIAGNOSIS — G43.009 MIGRAINE WITHOUT AURA AND WITHOUT STATUS MIGRAINOSUS, NOT INTRACTABLE: ICD-10-CM

## 2021-07-06 VITALS
SYSTOLIC BLOOD PRESSURE: 121 MMHG | RESPIRATION RATE: 16 BRPM | TEMPERATURE: 97 F | BODY MASS INDEX: 30.56 KG/M2 | WEIGHT: 171.13 LBS | HEART RATE: 77 BPM | DIASTOLIC BLOOD PRESSURE: 84 MMHG

## 2021-07-07 NOTE — PROGRESS NOTES
Assessment & Plan     Carole was seen today for medication management.    Diagnoses and all orders for this visit:    High risk medication use    Migraine without aura and without status migrainosus, not intractable  -     ZOLMitriptan (ZOMIG) 2.5 MG tablet; TAKE 1 TABLET BY MOUTH AS NEEDED FOR MIGRAINE, MAY REPEAT IN 2 HOURS    Herpes dermatitis  -     acyclovir (ZOVIRAX) 400 MG tablet; 1 tab 5 times a day for 4 to 5 days as needed for cold sore.    Great job with loosing 7 lbs!.    Set physical in 3 months.   Please check at .      When you need your phentermine refilled call the pharmacy I will give 30 days and 2 refills.    20 minutes spent on the date of the encounter doing chart review, history and exam, documentation and further activities per the note       Return in about 3 months (around 9/28/2021) for Annual physical.    Minerva Lockhart MD  [facility]   Carole CrowellDamir is 60 y.o. and presents today for the following health issues   HPI   Chief Complaint   Patient presents with     Medication Management     CSA/UDS 3/29/21     High risk med use:  Restarted phentermine 3 months ago.  She just filled her third prescription.  No side effects of chest pain palpitation shortness of breath etc.  She is walking most days of the week. She is staying away from carbs.  [place]  Max weight is 183, today 171.  Just filled 3rd script.   Lost 7 lbs since last visit.                Objective    /84 (Patient Site: Left Arm, Patient Position: Sitting, Cuff Size: Adult Regular)   Pulse 77   Temp 97  F (36.1  C) (Oral)   Resp 16   Wt 171 lb 2 oz (77.6 kg)   BMI 30.56 kg/m    Body mass index is 30.56 kg/m .  Physical Exam  Gen: no apparent distress  CV: Regular rate and rhythm without murmur  Lungs: Clear to auscultation bilaterally

## 2021-07-07 NOTE — PATIENT INSTRUCTIONS - HE
Great job with loosing 7 lbs!.    Set physical in 3 months.   Please check at .      When you need your phentermine refilled call the pharmacy I will give 30 days and 2 refills.

## 2021-07-21 ENCOUNTER — RECORDS - HEALTHEAST (OUTPATIENT)
Dept: ADMINISTRATIVE | Facility: CLINIC | Age: 61
End: 2021-07-21

## 2021-07-28 DIAGNOSIS — Z79.899 HIGH RISK MEDICATION USE: Primary | ICD-10-CM

## 2021-07-30 RX ORDER — PHENTERMINE HYDROCHLORIDE 37.5 MG/1
CAPSULE ORAL
Qty: 30 CAPSULE | Refills: 2 | Status: SHIPPED | OUTPATIENT
Start: 2021-07-30 | End: 2021-12-13

## 2021-07-30 NOTE — TELEPHONE ENCOUNTER
Routing refill request to provider for review/approval because:  Controlled medication refill request.  Routing to provider's care team.  ___________________________________________________________    Copy of Last Rx:        Last office visit with provider:  6/28/21   ___________________________________________________________      Requested Prescriptions   Pending Prescriptions Disp Refills     phentermine (ADIPEX-P) 37.5 MG capsule [Pharmacy Med Name: PHENTERMINE 37.5MG CAPSULES] 30 capsule      Sig: TAKE 1 CAPSULE(37.5 MG) BY MOUTH EVERY MORNING       There is no refill protocol information for this order            Brianna Raza RN 07/29/21 9:17 PM

## 2021-08-14 ENCOUNTER — HEALTH MAINTENANCE LETTER (OUTPATIENT)
Age: 61
End: 2021-08-14

## 2021-09-17 ENCOUNTER — TELEPHONE (OUTPATIENT)
Dept: FAMILY MEDICINE | Facility: CLINIC | Age: 61
End: 2021-09-17

## 2021-09-17 NOTE — TELEPHONE ENCOUNTER
Notification from [facility]     Disp Refills Start End KAVIN   phentermine (ADIPEX-P) 37.5 MG capsule 30 capsule 2 7/30/2021  No   Sig: TAKE 1 CAPSULE(37.5 MG) BY MOUTH EVERY MORNING   Sent to pharmacy as: Phentermine HCl 37.5 MG Oral Capsule (ADIPEX-P)   Class: E-Prescribe   Order: [id number]   E-Prescribing Status: Receipt confirmed by pharmacy (7/30/2021  5:28 PM CDT)     Not covered rx, PA started  Cover my Meds  Key [id number]

## 2021-09-20 NOTE — TELEPHONE ENCOUNTER
Central Prior Authorization Team   Phone: [phone]      PA Initiation    Medication: phentermine -Initiated  Insurance Company: [employer] ([facility]) - Phone [phone] Fax [phone]  Pharmacy Filling the Rx: [id number] [place], [place] - [address] [place] [address] E AT [facility] [address] & [facility]  Filling Pharmacy Phone: [phone]  Filling Pharmacy Fax:    Start Date: 9/20/2021

## 2021-09-21 NOTE — TELEPHONE ENCOUNTER
Prior Authorization Approval    Authorization Effective Date: 9/20/2021  Authorization Expiration Date: 12/20/2021  Medication: phentermine -APPROVED  Approved Dose/Quantity:   Reference #:     Insurance Company: [employer] ([facility]) - Phone [phone] Fax [phone]  Expected CoPay:       CoPay Card Available:      Foundation Assistance Needed:    Which Pharmacy is filling the prescription (Not needed for infusion/clinic administered): [id number] - [place] [address] [address] AT [place] [address] & [facility]  Pharmacy Notified: Yes  Patient Notified: No    Pharmacy will notify patient when medication is ready.

## 2021-09-22 ENCOUNTER — OFFICE VISIT (OUTPATIENT)
Dept: FAMILY MEDICINE | Facility: CLINIC | Age: 61
End: 2021-09-22
Payer: COMMERCIAL

## 2021-09-22 VITALS
TEMPERATURE: 96.7 F | HEART RATE: 75 BPM | WEIGHT: 169 LBS | HEIGHT: 63 IN | DIASTOLIC BLOOD PRESSURE: 83 MMHG | SYSTOLIC BLOOD PRESSURE: 126 MMHG | BODY MASS INDEX: 29.95 KG/M2 | RESPIRATION RATE: 16 BRPM

## 2021-09-22 DIAGNOSIS — E78.5 HYPERLIPIDEMIA LDL GOAL <130: ICD-10-CM

## 2021-09-22 DIAGNOSIS — D12.6 ADENOMATOUS POLYP OF COLON, UNSPECIFIED PART OF COLON: ICD-10-CM

## 2021-09-22 DIAGNOSIS — R05.9 COUGH: ICD-10-CM

## 2021-09-22 DIAGNOSIS — Z23 NEED FOR PROPHYLACTIC VACCINATION AND INOCULATION AGAINST INFLUENZA: Primary | ICD-10-CM

## 2021-09-22 DIAGNOSIS — D23.9 DYSPLASTIC NEVI: ICD-10-CM

## 2021-09-22 DIAGNOSIS — Z79.899 HIGH RISK MEDICATION USE: ICD-10-CM

## 2021-09-22 DIAGNOSIS — Z12.31 VISIT FOR SCREENING MAMMOGRAM: ICD-10-CM

## 2021-09-22 DIAGNOSIS — Z78.0 MENOPAUSE: ICD-10-CM

## 2021-09-22 DIAGNOSIS — I10 BENIGN ESSENTIAL HYPERTENSION: ICD-10-CM

## 2021-09-22 DIAGNOSIS — E55.9 VITAMIN D DEFICIENCY: ICD-10-CM

## 2021-09-22 LAB
ALBUMIN SERPL-MCNC: 3.9 G/DL (ref 3.5–5)
ALP SERPL-CCNC: 57 U/L (ref 45–120)
ALT SERPL W P-5'-P-CCNC: 13 U/L (ref 0–45)
ANION GAP SERPL CALCULATED.3IONS-SCNC: 11 MMOL/L (ref 5–18)
AST SERPL W P-5'-P-CCNC: 19 U/L (ref 0–40)
BILIRUB SERPL-MCNC: 0.9 MG/DL (ref 0–1)
BUN SERPL-MCNC: 20 MG/DL (ref 8–22)
CALCIUM SERPL-MCNC: 9.6 MG/DL (ref 8.5–10.5)
CHLORIDE BLD-SCNC: 103 MMOL/L (ref 98–107)
CHOLEST SERPL-MCNC: 254 MG/DL
CO2 SERPL-SCNC: 29 MMOL/L (ref 22–31)
CREAT SERPL-MCNC: 0.78 MG/DL (ref 0.6–1.1)
ERYTHROCYTE [DISTWIDTH] IN BLOOD BY AUTOMATED COUNT: 12.9 % (ref 10–15)
FASTING STATUS PATIENT QL REPORTED: YES
GFR SERPL CREATININE-BSD FRML MDRD: 82 ML/MIN/1.73M2
GLUCOSE BLD-MCNC: 93 MG/DL (ref 70–125)
HCT VFR BLD AUTO: 44.2 % (ref 35–47)
HDLC SERPL-MCNC: 68 MG/DL
HGB BLD-MCNC: 14.5 G/DL (ref 11.7–15.7)
LDLC SERPL CALC-MCNC: 157 MG/DL
MCH RBC QN AUTO: 30.4 PG (ref 26.5–33)
MCHC RBC AUTO-ENTMCNC: 32.8 G/DL (ref 31.5–36.5)
MCV RBC AUTO: 93 FL (ref 78–100)
PLATELET # BLD AUTO: 322 10E3/UL (ref 150–450)
POTASSIUM BLD-SCNC: 3.9 MMOL/L (ref 3.5–5)
PROT SERPL-MCNC: 6.9 G/DL (ref 6–8)
RBC # BLD AUTO: 4.77 10E6/UL (ref 3.8–5.2)
SODIUM SERPL-SCNC: 143 MMOL/L (ref 136–145)
TRIGL SERPL-MCNC: 143 MG/DL
TSH SERPL DL<=0.005 MIU/L-ACNC: 2.53 UIU/ML (ref 0.3–5)
WBC # BLD AUTO: 8.1 10E3/UL (ref 4–11)

## 2021-09-22 PROCEDURE — 36415 COLL VENOUS BLD VENIPUNCTURE: CPT | Performed by: FAMILY MEDICINE

## 2021-09-22 PROCEDURE — 80053 COMPREHEN METABOLIC PANEL: CPT | Performed by: FAMILY MEDICINE

## 2021-09-22 PROCEDURE — 82306 VITAMIN D 25 HYDROXY: CPT | Performed by: FAMILY MEDICINE

## 2021-09-22 PROCEDURE — 99213 OFFICE O/P EST LOW 20 MIN: CPT | Mod: 25 | Performed by: FAMILY MEDICINE

## 2021-09-22 PROCEDURE — 80061 LIPID PANEL: CPT | Performed by: FAMILY MEDICINE

## 2021-09-22 PROCEDURE — 84443 ASSAY THYROID STIM HORMONE: CPT | Performed by: FAMILY MEDICINE

## 2021-09-22 PROCEDURE — 85027 COMPLETE CBC AUTOMATED: CPT | Performed by: FAMILY MEDICINE

## 2021-09-22 PROCEDURE — 99396 PREV VISIT EST AGE 40-64: CPT | Performed by: FAMILY MEDICINE

## 2021-09-22 RX ORDER — MULTIVITAMIN,THERAPEUTIC
2 TABLET ORAL DAILY
COMMUNITY

## 2021-09-22 RX ORDER — HYDROCHLOROTHIAZIDE 25 MG/1
25 TABLET ORAL DAILY
COMMUNITY
Start: 2021-04-23 | End: 2022-06-06

## 2021-09-22 RX ORDER — FLUTICASONE PROPIONATE 50 MCG
1 SPRAY, SUSPENSION (ML) NASAL DAILY
Qty: 16 G | Refills: 4 | Status: SHIPPED | OUTPATIENT
Start: 2021-09-22 | End: 2021-12-22

## 2021-09-22 ASSESSMENT — MIFFLIN-ST. JEOR: SCORE: 1287.77

## 2021-09-22 NOTE — PROGRESS NOTES
SUBJECTIVE:   CC: Carole Reich is an 60 year old woman who presents for preventive health visit.       Patient has been advised of split billing requirements and indicates understanding: Yes  Imm/Inj    Healthy Habits:     Getting at least 3 servings of Calcium per day:  Yes    Bi-annual eye exam:  NO    Dental care twice a year:  Yes    Sleep apnea or symptoms of sleep apnea:  None    Diet:  Regular (no restrictions)    Frequency of exercise:  2-3 days/week    Duration of exercise:  15-30 minutes    Taking medications regularly:  No    Medication side effects:  None    PHQ-2 Total Score: 0    Additional concerns today:  Yes    HPI:  Cough: for year. Constantly have to clear her throat.  COes as goes.  Not a smoker, quit 1986. More of a dry cough.  Very reare phlegm in throat. Possible more iteh morning or with sitting back.  Rare GERD.  No dysohagia.  Nihisotry of asthma not shortof breath.  No chest pain.    High risk med use:  No SE.  Will try to increase exercise to 4 days a week but very hard with work.   5 scripts so far in 2021.  Restarted med in March.  Denies any chest pain palpitations or shortness of breath.  She is down 2 pounds from her last visit and 9 pound since March and 15 pounds from her max weight.        Health Maintenance   Topic Date Due     ANNUAL REVIEW OF HM ORDERS  Today     COVID-19 Vaccine (1) Phone number to call to set up Covid vaccine is [phone].     ZOSTER IMMUNIZATION (1 of 2) If you are interested in the new shingles shot, Shingrix, please check with insurance for coverage either in clinic or at a pharmacy. It is a 2 shot series 2-6 months apart.      MAMMO SCREENING  04/10/2021, ordered     INFLUENZA VACCINE (1) Declined     PREVENTIVE CARE VISIT  Today     COLORECTAL CANCER SCREENING  Ordered     DTAP/TDAP/TD IMMUNIZATION (5 - Td or Tdap) 12/06/2023     HPV TEST  09/02/2023     LIPID  Today     ADVANCE CARE PLANNING  Packet given     PAP  09/02/2023     HEPATITIS  C SCREENING  Completed     HIV SCREENING  Completed     PHQ-2  Completed     Pneumococcal Vaccine: Pediatrics (0 to 5 Years) and At-Risk Patients (6 to 64 Years)  Will give at age 65     IPV IMMUNIZATION  Aged Out     MENINGITIS IMMUNIZATION  Aged Out     HEPATITIS B IMMUNIZATION  Discussed     Dexa scan:  Ordered            Today's PHQ-2 Score:   PHQ-2 (  [employer]) 2021   Q1: Little interest or pleasure in doing things 0   Q2: Feeling down, depressed or hopeless 0   PHQ-2 Score 0   Q1: Little interest or pleasure in doing things Not at all   Q2: Feeling down, depressed or hopeless Not at all   PHQ-2 Score 0       Abuse: Current or Past (Physical, Sexual or Emotional) - No  Do you feel safe in your environment? Yes        Social History     Tobacco Use     Smoking status: Former Smoker     Years: 10.00     Types: Cigarettes, Cigarettes     Quit date: 1986     Years since quittin.7     Smokeless tobacco: Never Used   Substance Use Topics     Alcohol use: Yes     Alcohol/week: 2.0 standard drinks         Alcohol Use 2021   Prescreen: >3 drinks/day or >7 drinks/week? No       Reviewed orders with patient.  Reviewed health maintenance and updated orders accordingly - Yes  Lab work is in process    Breast Cancer Screening:  Any new diagnosis of family breast, ovarian, or bowel cancer? Yes       FHS-7: No flowsheet data found.      Pertinent mammograms are reviewed under the imaging tab.    History of abnormal Pap smear: NO - age 30- 65 PAP every 3 years recommended  PAP / HPV Latest Ref Rng & Units 2020   PAP Negative for squamous intraepithelial lesion or malignancy. Negative for squamous intraepithelial lesion or malignancy  Electronically signed by Kelsy Sullivan CT (ASCP) on 2020 at  2:00 PM   Negative for squamous intraepithelial lesion or malignancy  Electronically signed by Viry Crystal CT (ASCP) on 10/13/2015 at  2:52 PM     HPV16 NEG Negative -   HPV18 NEG Negative  -   HRHPV NEG Negative -     Reviewed and updated as needed this visit by clinical staff   Allergies  Meds              Reviewed and updated as needed this visit by Provider                    Review of Systems  CONSTITUTIONAL: NEGATIVE for fever, chills, change in weight  INTEGUMENTARY/SKIN: NEGATIVE for worrisome rashes, moles or lesions  EYES: NEGATIVE for vision changes or irritation  ENT: NEGATIVE for ear, mouth and throat problems  RESP: NEGATIVE for significant cough or SOB  BREAST: NEGATIVE for masses, tenderness or discharge  CV: NEGATIVE for chest pain, palpitations or peripheral edema  GI: NEGATIVE for nausea, abdominal pain, heartburn, or change in bowel habits  : NEGATIVE for unusual urinary or vaginal symptoms. No vaginal bleeding.  MUSCULOSKELETAL: NEGATIVE for significant arthralgias or myalgia  NEURO: NEGATIVE for weakness, dizziness or paresthesias  PSYCHIATRIC: NEGATIVE for changes in mood or affect      OBJECTIVE:   There were no vitals taken for this visit.  Physical Exam  GENERAL: healthy, alert and no distress  EYES: Eyes grossly normal to inspection, PERRL and conjunctivae and sclerae normal  HENT: ear canals and TM's normal, nose and mouth without ulcers or lesions  NECK: no adenopathy, no asymmetry, masses, or scars and thyroid normal to palpation  RESP: lungs clear to auscultation - no rales, rhonchi or wheezes  BREAST: normal without masses, tenderness or nipple discharge and no palpable axillary masses or adenopathy  CV: regular rate and rhythm, normal S1 S2, no S3 or S4, no murmur, click or rub, no peripheral edema and peripheral pulses strong  ABDOMEN: soft, nontender, no hepatosplenomegaly, no masses and bowel sounds normal  MS: no gross musculoskeletal defects noted, no edema  SKIN: no suspicious lesions or rashes  NEURO: Normal strength and tone, mentation intact and speech normal  PSYCH: mentation appears normal, affect normal/bright    Diagnostic Test Results:  Labs reviewed  in Epic    ASSESSMENT/PLAN:       ICD-10-CM    1. Need for prophylactic vaccination and inoculation against influenza  Z23    2. Vitamin D deficiency  E55.9 Vitamin D Deficiency     Vitamin D Deficiency   3. Adenomatous polyp of colon, unspecified part of colon  D12.6 Adult Gastro Ref - Procedure Only   4. Hyperlipidemia LDL goal <130  E78.5 Comprehensive metabolic panel     Lipid Profile     Comprehensive metabolic panel     Lipid Profile   5. Benign essential hypertension  I10 CBC with platelets     CBC with platelets   6. Dysplastic nevi  D23.9 Adult Dermatology Referral   7. High risk medication use  Z79.899 TSH with free T4 reflex     TSH with free T4 reflex   8. Visit for screening mammogram  Z12.31 *MA Screening Digital Bilateral   9. Menopause  Z78.0 DX Hip/Pelvis/Spine   10. Cough  R05 XR Chest 2 Views     fluticasone (FLONASE) 50 MCG/ACT nasal spray     For the intermittent chronic cough I am going to get a chest x-ray today.    Also prescribing Flonase 2 sprays each nostril daily.  You want to use it in a month or longer and can stop when we have a hard frost.  Could restart in the spring if needed.    If chest x-ray is normal and the Flonase is not helping please let me know and I will refer you to a lung specialist.    Phone number to call to set up Covid vaccine is [phone].    Phone number to call and set up her mammogram is [phone].  Please ask for 3D if you are agreeable to that as that is a better mammogram.     Declined hepatitis a and B shots for now, if you change your mind you can have nurse visits.    Ideal calories are about  a day, you do not want to get under 1200 [place] or you put your body in starvation.    Trying to work and exercise 4 days a week.    Phentermine, have had 5 scripts since March. Just picked.    Set virtual or in person visit in 3 months for med check.    Dermatology consult ordered for full body skin check.    Patient has been advised of split billing  "requirements and indicates understanding: Yes  COUNSELING:  Reviewed preventive health counseling, as reflected in patient instructions       Regular exercise       Healthy diet/nutrition    Estimated body mass index is 30.56 kg/m  as calculated from the following:    Height as of 9/2/20: 1.594 m (5' 2.75\").    Weight as of 6/28/21: 77.6 kg (171 lb 2 oz).    Weight management plan: Discussed healthy diet and exercise guidelines    She reports that she quit smoking about 35 years ago. Her smoking use included cigarettes and cigarettes. She quit after 10.00 years of use. She has never used smokeless tobacco.      Counseling Resources:  ATP IV Guidelines  Pooled Cohorts Equation Calculator  Breast Cancer Risk Calculator  BRCA-Related Cancer Risk Assessment: FHS-7 Tool  FRAX Risk Assessment  ICSI Preventive Guidelines  Dietary Guidelines for Americans, 2010  USDA's MyPlate  ASA Prophylaxis  Lung CA Screening    Minerva Lockhart MD  [facility]  "

## 2021-09-22 NOTE — PATIENT INSTRUCTIONS
For the intermittent chronic cough I am going to get a chest x-ray today.    Also prescribing Flonase 2 sprays each nostril daily.  You want to use it in a month or longer and can stop when we have a hard frost.  Could restart in the spring if needed.    If chest x-ray is normal and the Flonase is not helping please let me know and I will refer you to a lung specialist.    Phone number to call to set up Covid vaccine is [phone].    Phone number to call and set up her mammogram is [phone].  Please ask for 3D if you are agreeable to that as that is a better mammogram.     Declined hepatitis a and B shots for now, if you change your mind you can have nurse visits.    Ideal calories are about  a day, you do not want to get under 1200 dalton or you put your body in starvation.    Trying to work and exercise 4 days a week.    Phentermine, have had 5 scripts since March. Just picked.    Set virtual or in person visit in 3 months for med check.    Dermatology consult ordered for full body skin check.      Health Maintenance   Topic Date Due     ANNUAL REVIEW OF HM ORDERS  Today     COVID-19 Vaccine (1) Phone number to call to set up Covid vaccine is [phone].     ZOSTER IMMUNIZATION (1 of 2) If you are interested in the new shingles shot, Shingrix, please check with insurance for coverage either in clinic or at a pharmacy. It is a 2 shot series 2-6 months apart.      MAMMO SCREENING  04/10/2021, ordered     INFLUENZA VACCINE (1) Declined     PREVENTIVE CARE VISIT  Today     COLORECTAL CANCER SCREENING  Ordered     DTAP/TDAP/TD IMMUNIZATION (5 - Td or Tdap) 12/06/2023     HPV TEST  09/02/2023     LIPID  Today     ADVANCE CARE PLANNING  Packet given     PAP  09/02/2023     HEPATITIS C SCREENING  Completed     HIV SCREENING  Completed     PHQ-2  Completed     Pneumococcal Vaccine: Pediatrics (0 to 5 Years) and At-Risk Patients (6 to 64 Years)  Will give at age 65     IPV IMMUNIZATION  Aged Out      MENINGITIS IMMUNIZATION  Aged Out     HEPATITIS B IMMUNIZATION  Discussed     Dexa scan:  Ordered

## 2021-09-24 LAB — DEPRECATED CALCIDIOL+CALCIFEROL SERPL-MC: 50 UG/L (ref 30–80)

## 2021-10-09 ENCOUNTER — HEALTH MAINTENANCE LETTER (OUTPATIENT)
Age: 61
End: 2021-10-09

## 2021-10-20 ENCOUNTER — ANCILLARY PROCEDURE (OUTPATIENT)
Dept: BONE DENSITY | Facility: CLINIC | Age: 61
End: 2021-10-20
Attending: FAMILY MEDICINE
Payer: COMMERCIAL

## 2021-10-20 DIAGNOSIS — Z78.0 MENOPAUSE: ICD-10-CM

## 2021-10-20 PROCEDURE — 77080 DXA BONE DENSITY AXIAL: CPT | Mod: TC | Performed by: RADIOLOGY

## 2021-11-29 ENCOUNTER — ANCILLARY PROCEDURE (OUTPATIENT)
Dept: MAMMOGRAPHY | Facility: CLINIC | Age: 61
End: 2021-11-29
Attending: FAMILY MEDICINE
Payer: COMMERCIAL

## 2021-11-29 DIAGNOSIS — Z12.31 VISIT FOR SCREENING MAMMOGRAM: ICD-10-CM

## 2021-11-29 PROCEDURE — 77063 BREAST TOMOSYNTHESIS BI: CPT

## 2021-12-07 ENCOUNTER — TRANSFERRED RECORDS (OUTPATIENT)
Dept: HEALTH INFORMATION MANAGEMENT | Facility: CLINIC | Age: 61
End: 2021-12-07
Payer: COMMERCIAL

## 2021-12-13 DIAGNOSIS — Z79.899 HIGH RISK MEDICATION USE: ICD-10-CM

## 2021-12-13 RX ORDER — PHENTERMINE HYDROCHLORIDE 37.5 MG/1
CAPSULE ORAL
Qty: 30 CAPSULE | Refills: 2 | Status: SHIPPED | OUTPATIENT
Start: 2021-12-13 | End: 2022-06-06

## 2021-12-22 ENCOUNTER — OFFICE VISIT (OUTPATIENT)
Dept: FAMILY MEDICINE | Facility: CLINIC | Age: 61
End: 2021-12-22
Payer: COMMERCIAL

## 2021-12-22 VITALS
TEMPERATURE: 97.3 F | DIASTOLIC BLOOD PRESSURE: 88 MMHG | BODY MASS INDEX: 30.09 KG/M2 | SYSTOLIC BLOOD PRESSURE: 124 MMHG | RESPIRATION RATE: 16 BRPM | HEART RATE: 76 BPM | WEIGHT: 167.2 LBS

## 2021-12-22 DIAGNOSIS — Z79.899 HIGH RISK MEDICATION USE: Primary | ICD-10-CM

## 2021-12-22 PROCEDURE — 99213 OFFICE O/P EST LOW 20 MIN: CPT | Performed by: FAMILY MEDICINE

## 2021-12-22 NOTE — PATIENT INSTRUCTIONS
Continue your treadmill 3 times a week and on the nontreadmill days try to get in 6000-10,000 steps.    If you find it helpful track daily calories trying to keep them about 6799-3564 a day.    If you feel you have COVID please submit an E-visit to me and we will order a Covid swab for you at one of our walk-in or drive-through locations.  If that is positive then you could go on the [facility] website and see if you qualify for the monoclonal antibodies.  Certainly if you are severely ill you do need to call 911 or get to the hospital.    Phone number to call to set up Covid vaccine is [phone].    Set med check in 3 months.    Physical due in Sept 2022.

## 2021-12-22 NOTE — PROGRESS NOTES
Assessment & Plan       ICD-10-CM    1. High risk medication use  Z79.899      Continue your treadmill 3 times a week and on the nontreadmill days try to get in 6000-10,000 steps.    If you find it helpful track daily calories trying to keep them about 5539-9646 a day.    If you feel you have COVID please submit an E-visit to me and we will order a Covid swab for you at one of our walk-in or drive-through locations.  If that is positive then you could go on the [facility] website and see if you qualify for the monoclonal antibodies.  Certainly if you are severely ill you do need to call 911 or get to the hospital.    Phone number to call to set up Covid vaccine is [phone].    Set med check in 3 months.    Physical due in Sept 2022.      20 minutes spent on the date of the encounter doing chart review, history and exam, documentation and further activities per the note           No follow-ups on file.    Minerva Lockhart MD  [facility]    Daniel Lam is a 61 year old who presents for the following health issues     HPI   Treadmill 2 miles 3 times a week.  No side effects from the phentermine such as chest pain palpitations or shortness of breath. 6 scripts since Mar.  Cutting out sweets and sugars.  Eating a mix of protein and carbs.  Monitoring intake.   She is down 2 pound since her last visit and 17 pounds overall.    Covid qeustion:  Not wanting vaccine.  She wants to know if she were to get Covid if she would qualify for monoclonal antibodies.          Review of Systems         Objective    /88 (BP Location: Left arm, Patient Position: Sitting, Cuff Size: Adult Regular)   Pulse 76   Temp 97.3  F (36.3  C) (Oral)   Resp 16   Wt 75.8 kg (167 lb 3.2 oz)   BMI 30.09 kg/m    Body mass index is 30.09 kg/m .  Physical Exam   GENERAL: healthy, alert and no distress  RESP: lungs clear to auscultation - no rales, rhonchi or wheezes  CV:  regular rate and rhythm, normal S1 S2, no S3 or S4, no murmur, click or rub, no peripheral edema and peripheral pulses strong

## 2022-02-02 ENCOUNTER — TELEPHONE (OUTPATIENT)
Dept: FAMILY MEDICINE | Facility: CLINIC | Age: 62
End: 2022-02-02
Payer: COMMERCIAL

## 2022-02-03 NOTE — TELEPHONE ENCOUNTER
I do not do prior auths for Phentermine. If she needs to pay out of pocket for this,  it is cheapest at [employer], [employer] or [employer].

## 2022-06-06 ENCOUNTER — OFFICE VISIT (OUTPATIENT)
Dept: FAMILY MEDICINE | Facility: CLINIC | Age: 62
End: 2022-06-06
Payer: COMMERCIAL

## 2022-06-06 VITALS
BODY MASS INDEX: 30.44 KG/M2 | DIASTOLIC BLOOD PRESSURE: 94 MMHG | SYSTOLIC BLOOD PRESSURE: 124 MMHG | RESPIRATION RATE: 16 BRPM | HEART RATE: 75 BPM | TEMPERATURE: 97.6 F | WEIGHT: 169.13 LBS

## 2022-06-06 DIAGNOSIS — Z12.11 SCREEN FOR COLON CANCER: Primary | ICD-10-CM

## 2022-06-06 DIAGNOSIS — Z79.899 HIGH RISK MEDICATION USE: ICD-10-CM

## 2022-06-06 DIAGNOSIS — Z79.899 CONTROLLED SUBSTANCE AGREEMENT SIGNED: ICD-10-CM

## 2022-06-06 DIAGNOSIS — Z79.899 ENCOUNTER FOR LONG-TERM (CURRENT) USE OF MEDICATIONS: ICD-10-CM

## 2022-06-06 DIAGNOSIS — G43.009 MIGRAINE WITHOUT AURA AND WITHOUT STATUS MIGRAINOSUS, NOT INTRACTABLE: ICD-10-CM

## 2022-06-06 DIAGNOSIS — I10 BENIGN ESSENTIAL HYPERTENSION: ICD-10-CM

## 2022-06-06 LAB — CREAT UR-MCNC: 56 MG/DL

## 2022-06-06 PROCEDURE — 80307 DRUG TEST PRSMV CHEM ANLYZR: CPT | Performed by: FAMILY MEDICINE

## 2022-06-06 PROCEDURE — 99214 OFFICE O/P EST MOD 30 MIN: CPT | Performed by: FAMILY MEDICINE

## 2022-06-06 RX ORDER — ZOLMITRIPTAN 2.5 MG/1
2.5 TABLET, FILM COATED ORAL PRN
Status: CANCELLED | OUTPATIENT
Start: 2022-06-06

## 2022-06-06 RX ORDER — HYDROCORTISONE 2.5 %
CREAM (GRAM) TOPICAL
COMMUNITY
Start: 2021-12-07 | End: 2022-09-12

## 2022-06-06 RX ORDER — PHENTERMINE HYDROCHLORIDE 37.5 MG/1
CAPSULE ORAL
Qty: 30 CAPSULE | Refills: 2 | Status: ON HOLD | OUTPATIENT
Start: 2022-06-06 | End: 2022-09-14

## 2022-06-06 RX ORDER — ZOLMITRIPTAN 5 MG/1
5 TABLET, FILM COATED ORAL
Qty: 12 TABLET | Refills: 3 | Status: SHIPPED | OUTPATIENT
Start: 2022-06-06 | End: 2022-10-03

## 2022-06-06 RX ORDER — HYDROCHLOROTHIAZIDE 25 MG/1
25 TABLET ORAL DAILY
Qty: 90 TABLET | Refills: 3 | Status: ON HOLD | OUTPATIENT
Start: 2022-06-06 | End: 2022-09-14

## 2022-06-06 RX ORDER — KETOCONAZOLE 20 MG/G
CREAM TOPICAL
COMMUNITY
Start: 2021-12-07 | End: 2022-09-12

## 2022-06-06 NOTE — PROGRESS NOTES
Assessment & Plan       ICD-10-CM    1. Screen for colon cancer  Z12.11    2. High risk medication use  Z79.899 phentermine (ADIPEX-P) 37.5 MG capsule   3. Benign essential hypertension  I10 hydrochlorothiazide (HYDRODIURIL) 25 MG tablet   4. Migraine without aura and without status migrainosus, not intractable  G43.009 ZOLMitriptan (ZOMIG) 5 MG tablet   5. Controlled substance agreement signed-Phentermine 37.5 mg, max OF 30 PER 30 DAYS, CSA AND URINE TOX DONE 6-6-22  Z79.899    6. Encounter for long-term (current) use of medications  Z79.899 [id number] - Urine Drug Confirmation Panel (Comprehensive)     [id number] - Urine Drug Confirmation Panel (Comprehensive)     Patient Instructions   Recommendation would be to start tracking calories keeping him 2447-2064 a day.  Also a lower carb diet.    If possible trying to get 30 to 45 minutes of activity most days of the week or 10,000 steps in a day.  If that is not possible try to maybe increase exercise from 3 to 4 days a week by your next visit.    Refill phentermine 37.5 mg 30 tabs 2 refills.    Physical will be due in September.    You will have 6 prescriptions left on this cycle of phentermine before the body needs a break.    We will get you colonoscopy report.    Since your blood pressure cuff is similar to ours we will continue your same dose of medication at this point and please check your blood pressure a couple of times a week at home.  If you do get a reading over 140 on top or over 90 on the bottom let me know and I will increase your hydrochlorothiazide.    Zomig refilled.  Hydrochlorothiazide refilled.    Renewed controlled substance agreement and did urine tox today for phentermine 37.5 mg.           30 minutes spent on the date of the encounter doing chart review, history and exam, documentation and further activities per the note       BMI:   Estimated body mass index is 30.44 kg/m  as calculated from the following:    Height as of 9/22/21: 1.588 m (5'  "2.5\").    Weight as of this encounter: 76.7 kg (169 lb 2 oz).           No follow-ups on file.    Minerva Lockhart MD  [facility]    Daniel Lam is a 61 year old who presents for the following health issues     History of Present Illness       Reason for visit:  Med check    She eats 2-3 servings of fruits and vegetables daily.She exercises with enough effort to increase her heart rate 20 to 29 minutes per day.  She exercises with enough effort to increase her heart rate 3 or less days per week. She is missing 2 dose(s) of medications per week.     Hypertension:  Elevated at home as well as here today.  3 weeks ago was normal at 121/86.  No vision change or headache.    Phentermine:  Our of Phentermine for 6 weeks.  Exercising 3 days a  Week for 30 min.  Plus other activity.  Not tracking calories.  Max weigh 183 lbs.  Gained 2 lbs in 6 months.  NO side effects from med such as chest pain, shortness of breath or palpitations.  Using 2 scripts per 3 months.  3 scripts in last 6 months.  9 scripts since Mar 2021.  Very high level stress job and hard to leave her desk at home.  for [employer] company.  Thinking about retiring.    Saw Derm.  For full body skin check.    Social:  Mom with anal cancer that has spread to her abdomen.          Review of Systems         Objective    There were no vitals taken for this visit.  There is no height or weight on file to calculate BMI.  Physical Exam   GENERAL: healthy, alert and no distress  RESP: lungs clear to auscultation - no rales, rhonchi or wheezes  CV: regular rate and rhythm, normal S1 S2, no S3 or S4, no murmur, click or rub, no peripheral edema and peripheral pulses strong                "

## 2022-06-06 NOTE — PATIENT INSTRUCTIONS
Recommendation would be to start tracking calories keeping him [phone] a day.  Also a lower carb diet.    If possible trying to get 30 to 45 minutes of activity most days of the week or 10,000 steps in a day.  If that is not possible try to maybe increase exercise from 3 to 4 days a week by your next visit.    Refill phentermine 37.5 mg 30 tabs 2 refills.    Physical will be due in September.    You will have 6 prescriptions left on this cycle of phentermine before the body needs a break.    We will get you colonoscopy report.    Since your blood pressure cuff is similar to ours we will continue your same dose of medication at this point and please check your blood pressure a couple of times a week at home.  If you do get a reading over 140 on top or over 90 on the bottom let me know and I will increase your hydrochlorothiazide.    Zomig refilled.  Hydrochlorothiazide refilled.    Renewed controlled substance agreement and did urine tox today for phentermine 37.5 mg.

## 2022-06-06 NOTE — LETTER
[facility]  06/06/22  Patient: Carole Reich  YOB: 1960  Medical Record Number: [id number]                                                                                  Non-Opioid Controlled Substance Agreement    This is an agreement between you and your provider regarding safe and appropriate use of controlled substances prescribed by your care team. Controlled substances are?medicines that can cause physical and mental dependence (abuse).     There are strict laws about having and using these medicines. We here at [facility] are  committed to working with you in your efforts to get better. To support you in this work, we'll help you schedule regular office appointments for medicine refills. If we must cancel or change your appointment for any reason, we'll make sure you have enough medicine to last until your next appointment.     As a Provider, I will:     Listen carefully to your concerns while treating you with respect.     Recommend a treatment plan that I believe is in your best interest and may involve therapies other than medicine.      Talk with you often about the possible benefits and the risk of harm of any medicine that we prescribe for you.    Assess the safety of this medicine and check how well it works.      Provide a plan on how to taper (discontinue or go off) using this medicine if the decision is made to stop its use.      ::  As a Patient, I understand controlled substances:       Are prescribed by my care provider to help me function or work and manage my condition(s).?    Are strong medicines and can cause serious side effects.       Need to be taken exactly as prescribed.?Combining controlled substances with certain medicines or chemicals (such as illegal drugs, alcohol, sedatives, sleeping pills, and benzodiazepines) can be dangerous or even fatal.? If I stop taking my medicines suddenly, I may have severe withdrawal symptoms.      The risks, benefits, and side effects of these medicine(s) were explained to me. I agree that:    1. I will take part in other treatments as advised by my care team. This may be psychiatry or counseling, physical therapy, behavioral therapy, group treatment or a referral to specialist.    2. I will keep all my appointments and understand this is part of the monitoring of controlled substances.?My care team may require an office visit for EVERY controlled substance refill. If I miss appointments or don t follow instructions, my care team may stop my medicine    3. I will take my medicines as prescribed. I will not change the dose or schedule unless my care team tells me to. There will be no refills if I run out early.      4. I may be asked to come to the clinic and complete a urine drug test or complete a pill count. If I don t give a urine sample or participate in a pill count, the care team may stop my medicine.    5. I will only receive controlled substance prescriptions from this clinic. If I am treated by another provider, I will tell them that I am taking controlled substances and that I have a treatment agreement with this provider. I will inform my [facility] care team within one business day if I am given a prescription for any controlled substance by another healthcare provider. My [facility] care team can contact other providers and pharmacists about my use of any medicines.    6. It is up to me to make sure that I don't run out of my medicines on weekends or holidays.?If my care team is willing to refill my prescription without a visit, I must request refills only during office hours. Refills may take up to 3 business days to process. I will use one pharmacy to fill all my controlled substance prescriptions. I will notify the clinic about any changes to my insurance or medicine availability.    7. I am responsible for my prescriptions. If the medicine/prescription is lost, stolen or  destroyed, it will not be replaced.?I also agree not to share controlled substance medicines with anyone.     8. I am aware I should not use any illegal or recreational drugs. I agree not to drink alcohol unless my care team says I can.     9. If I enroll in the [place] Medical Cannabis program, I will tell my care team before my next refill.    10. I will tell my care team right away if I become pregnant, have a new medical problem treated outside of my regular clinic, or have a change in my medicines.     11. I understand that this medicine can affect my thinking, judgment and reaction time.? Alcohol and drugs affect the brain and body, which can affect the safety of my driving. Being under the influence of alcohol or drugs can affect my decision-making, behaviors, personal safety and the safety of others. Driving while impaired (DWI) can occur if a person is driving, operating or in physical control of a car, motorcycle, boat, snowmobile, ATV, motorbike, off-road vehicle or any other motor vehicle ([place] Statute 169A.20). I understand the risk if I choose to drive or operate any vehicle or machinery.    I understand that if I do not follow any of the conditions above, my prescriptions or treatment may be stopped or changed.   I agree that my provider, clinic care team and pharmacy may work with any city, state or federal law enforcement agency that investigates the misuse, sale or other diversion of my controlled medicine. I will allow my provider to discuss my care with, or share a copy of, this agreement with any other treating provider, pharmacy or emergency room where I receive care.     I have read this agreement and have asked questions about anything I did not understand.    ________________________________________________________  Patient Signature - Carole Crowell Damir     ___________________                   Date     ________________________________________________________  Provider Signature - Minerva  A Richy, MD       ___________________                   Date     ________________________________________________________  Witness Signature (required if provider not present while patient signing)          ___________________                   Date

## 2022-07-29 DIAGNOSIS — I10 BENIGN ESSENTIAL HYPERTENSION: ICD-10-CM

## 2022-07-31 RX ORDER — HYDROCHLOROTHIAZIDE 25 MG/1
TABLET ORAL
Qty: 90 TABLET | Refills: 3 | OUTPATIENT
Start: 2022-07-31

## 2022-09-12 ENCOUNTER — HOSPITAL ENCOUNTER (INPATIENT)
Facility: HOSPITAL | Age: 62
LOS: 2 days | Discharge: HOME | DRG: 291 | End: 2022-09-14
Attending: EMERGENCY MEDICINE | Admitting: FAMILY MEDICINE
Payer: COMMERCIAL

## 2022-09-12 ENCOUNTER — TRANSFERRED RECORDS (OUTPATIENT)
Dept: HEALTH INFORMATION MANAGEMENT | Facility: CLINIC | Age: 62
End: 2022-09-12

## 2022-09-12 DIAGNOSIS — I21.4 NSTEMI (NON-ST ELEVATED MYOCARDIAL INFARCTION) (H): Primary | ICD-10-CM

## 2022-09-12 DIAGNOSIS — I10 BENIGN ESSENTIAL HYPERTENSION: ICD-10-CM

## 2022-09-12 DIAGNOSIS — I50.31 ACUTE HEART FAILURE WITH PRESERVED EJECTION FRACTION (HFPEF) (H): ICD-10-CM

## 2022-09-12 PROBLEM — M54.6 ACUTE MIDLINE THORACIC BACK PAIN: Status: ACTIVE | Noted: 2022-09-12

## 2022-09-12 LAB
BNP SERPL-MCNC: 446 PG/ML (ref 0–101)
BNP SERPL-MCNC: 457 PG/ML (ref 0–101)
ERYTHROCYTE [DISTWIDTH] IN BLOOD BY AUTOMATED COUNT: 13.7 % (ref 10–15)
HCT VFR BLD AUTO: 39 % (ref 35–47)
HGB BLD-MCNC: 12.4 G/DL (ref 11.7–15.7)
MCH RBC QN AUTO: 30 PG (ref 26.5–33)
MCHC RBC AUTO-ENTMCNC: 31.8 G/DL (ref 31.5–36.5)
MCV RBC AUTO: 94 FL (ref 78–100)
PLATELET # BLD AUTO: 264 10E3/UL (ref 150–450)
RBC # BLD AUTO: 4.13 10E6/UL (ref 3.8–5.2)
TROPONIN I SERPL-MCNC: 0.22 NG/ML (ref 0–0.29)
TROPONIN I SERPL-MCNC: 0.27 NG/ML (ref 0–0.29)
UFH PPP CHRO-ACNC: 0.43 IU/ML
WBC # BLD AUTO: 11.4 10E3/UL (ref 4–11)

## 2022-09-12 PROCEDURE — 250N000013 HC RX MED GY IP 250 OP 250 PS 637: Performed by: FAMILY MEDICINE

## 2022-09-12 PROCEDURE — 84484 ASSAY OF TROPONIN QUANT: CPT | Performed by: EMERGENCY MEDICINE

## 2022-09-12 PROCEDURE — 250N000011 HC RX IP 250 OP 636: Performed by: INTERNAL MEDICINE

## 2022-09-12 PROCEDURE — 83880 ASSAY OF NATRIURETIC PEPTIDE: CPT | Performed by: FAMILY MEDICINE

## 2022-09-12 PROCEDURE — 250N000011 HC RX IP 250 OP 636: Performed by: EMERGENCY MEDICINE

## 2022-09-12 PROCEDURE — 96374 THER/PROPH/DIAG INJ IV PUSH: CPT

## 2022-09-12 PROCEDURE — 83880 ASSAY OF NATRIURETIC PEPTIDE: CPT | Performed by: INTERNAL MEDICINE

## 2022-09-12 PROCEDURE — 84484 ASSAY OF TROPONIN QUANT: CPT | Performed by: FAMILY MEDICINE

## 2022-09-12 PROCEDURE — 36415 COLL VENOUS BLD VENIPUNCTURE: CPT | Performed by: EMERGENCY MEDICINE

## 2022-09-12 PROCEDURE — 210N000001 HC R&B IMCU HEART CARE

## 2022-09-12 PROCEDURE — 36415 COLL VENOUS BLD VENIPUNCTURE: CPT | Performed by: INTERNAL MEDICINE

## 2022-09-12 PROCEDURE — 99222 1ST HOSP IP/OBS MODERATE 55: CPT | Performed by: INTERNAL MEDICINE

## 2022-09-12 PROCEDURE — 93005 ELECTROCARDIOGRAM TRACING: CPT | Performed by: EMERGENCY MEDICINE

## 2022-09-12 PROCEDURE — 85520 HEPARIN ASSAY: CPT | Performed by: FAMILY MEDICINE

## 2022-09-12 PROCEDURE — 99285 EMERGENCY DEPT VISIT HI MDM: CPT | Mod: 25

## 2022-09-12 PROCEDURE — 85027 COMPLETE CBC AUTOMATED: CPT | Performed by: EMERGENCY MEDICINE

## 2022-09-12 PROCEDURE — 99223 1ST HOSP IP/OBS HIGH 75: CPT | Performed by: FAMILY MEDICINE

## 2022-09-12 RX ORDER — ATORVASTATIN CALCIUM 10 MG/1
10 TABLET, FILM COATED ORAL EVERY EVENING
Status: DISCONTINUED | OUTPATIENT
Start: 2022-09-12 | End: 2022-09-14 | Stop reason: HOSPADM

## 2022-09-12 RX ORDER — ACETAMINOPHEN 650 MG/1
650 SUPPOSITORY RECTAL EVERY 6 HOURS PRN
Status: DISCONTINUED | OUTPATIENT
Start: 2022-09-12 | End: 2022-09-14 | Stop reason: HOSPADM

## 2022-09-12 RX ORDER — MORPHINE SULFATE 2 MG/ML
2 INJECTION, SOLUTION INTRAMUSCULAR; INTRAVENOUS
Status: DISCONTINUED | OUTPATIENT
Start: 2022-09-12 | End: 2022-09-14 | Stop reason: HOSPADM

## 2022-09-12 RX ORDER — ACETAMINOPHEN 325 MG/1
650 TABLET ORAL EVERY 6 HOURS PRN
Status: DISCONTINUED | OUTPATIENT
Start: 2022-09-12 | End: 2022-09-14 | Stop reason: HOSPADM

## 2022-09-12 RX ORDER — DOCUSATE SODIUM 100 MG/1
100 CAPSULE, LIQUID FILLED ORAL 2 TIMES DAILY
Status: DISCONTINUED | OUTPATIENT
Start: 2022-09-12 | End: 2022-09-14 | Stop reason: HOSPADM

## 2022-09-12 RX ORDER — ONDANSETRON 2 MG/ML
4 INJECTION INTRAMUSCULAR; INTRAVENOUS EVERY 6 HOURS PRN
Status: DISCONTINUED | OUTPATIENT
Start: 2022-09-12 | End: 2022-09-14 | Stop reason: HOSPADM

## 2022-09-12 RX ORDER — NITROGLYCERIN 0.4 MG/1
0.4 TABLET SUBLINGUAL EVERY 5 MIN PRN
Status: DISCONTINUED | OUTPATIENT
Start: 2022-09-12 | End: 2022-09-14 | Stop reason: HOSPADM

## 2022-09-12 RX ORDER — CETIRIZINE HYDROCHLORIDE 10 MG/1
10 TABLET ORAL DAILY
COMMUNITY

## 2022-09-12 RX ORDER — FUROSEMIDE 10 MG/ML
20 INJECTION INTRAMUSCULAR; INTRAVENOUS EVERY 12 HOURS
Status: COMPLETED | OUTPATIENT
Start: 2022-09-12 | End: 2022-09-13

## 2022-09-12 RX ORDER — BISACODYL 10 MG
10 SUPPOSITORY, RECTAL RECTAL DAILY PRN
Status: DISCONTINUED | OUTPATIENT
Start: 2022-09-12 | End: 2022-09-14 | Stop reason: HOSPADM

## 2022-09-12 RX ORDER — CETIRIZINE HYDROCHLORIDE 10 MG/1
10 TABLET ORAL DAILY
Status: DISCONTINUED | OUTPATIENT
Start: 2022-09-12 | End: 2022-09-14 | Stop reason: HOSPADM

## 2022-09-12 RX ORDER — PROCHLORPERAZINE 25 MG
25 SUPPOSITORY, RECTAL RECTAL EVERY 12 HOURS PRN
Status: DISCONTINUED | OUTPATIENT
Start: 2022-09-12 | End: 2022-09-14 | Stop reason: HOSPADM

## 2022-09-12 RX ORDER — LIDOCAINE 40 MG/G
CREAM TOPICAL
Status: DISCONTINUED | OUTPATIENT
Start: 2022-09-12 | End: 2022-09-14 | Stop reason: HOSPADM

## 2022-09-12 RX ORDER — HEPARIN SODIUM 10000 [USP'U]/100ML
0-5000 INJECTION, SOLUTION INTRAVENOUS CONTINUOUS
Status: DISCONTINUED | OUTPATIENT
Start: 2022-09-12 | End: 2022-09-13

## 2022-09-12 RX ORDER — PROCHLORPERAZINE MALEATE 10 MG
10 TABLET ORAL EVERY 6 HOURS PRN
Status: DISCONTINUED | OUTPATIENT
Start: 2022-09-12 | End: 2022-09-14 | Stop reason: HOSPADM

## 2022-09-12 RX ORDER — ONDANSETRON 4 MG/1
4 TABLET, ORALLY DISINTEGRATING ORAL EVERY 6 HOURS PRN
Status: DISCONTINUED | OUTPATIENT
Start: 2022-09-12 | End: 2022-09-14 | Stop reason: HOSPADM

## 2022-09-12 RX ORDER — ACYCLOVIR 400 MG/1
TABLET ORAL
COMMUNITY
End: 2022-10-03

## 2022-09-12 RX ORDER — HYDROCHLOROTHIAZIDE 25 MG/1
25 TABLET ORAL DAILY
Status: DISCONTINUED | OUTPATIENT
Start: 2022-09-12 | End: 2022-09-12

## 2022-09-12 RX ADMIN — HEPARIN SODIUM AND DEXTROSE 900 UNITS/HR: 10000; 5 INJECTION INTRAVENOUS at 12:55

## 2022-09-12 RX ADMIN — FUROSEMIDE 20 MG: 10 INJECTION, SOLUTION INTRAMUSCULAR; INTRAVENOUS at 17:59

## 2022-09-12 RX ADMIN — CETIRIZINE HYDROCHLORIDE 10 MG: 10 TABLET ORAL at 17:59

## 2022-09-12 RX ADMIN — ACETAMINOPHEN 650 MG: 325 TABLET, FILM COATED ORAL at 22:12

## 2022-09-12 RX ADMIN — DOCUSATE SODIUM 100 MG: 100 CAPSULE, LIQUID FILLED ORAL at 22:06

## 2022-09-12 RX ADMIN — ACETAMINOPHEN 650 MG: 325 TABLET, FILM COATED ORAL at 17:59

## 2022-09-12 RX ADMIN — ATORVASTATIN CALCIUM 10 MG: 10 TABLET, FILM COATED ORAL at 22:05

## 2022-09-12 ASSESSMENT — ACTIVITIES OF DAILY LIVING (ADL)
DIFFICULTY_EATING/SWALLOWING: NO
ADLS_ACUITY_SCORE: 18
WALKING_OR_CLIMBING_STAIRS_DIFFICULTY: NO
ADLS_ACUITY_SCORE: 35
CHANGE_IN_FUNCTIONAL_STATUS_SINCE_ONSET_OF_CURRENT_ILLNESS/INJURY: NO
WEAR_GLASSES_OR_BLIND: NO
DRESSING/BATHING_DIFFICULTY: NO
ADLS_ACUITY_SCORE: 35
FALL_HISTORY_WITHIN_LAST_SIX_MONTHS: NO
DOING_ERRANDS_INDEPENDENTLY_DIFFICULTY: NO
CONCENTRATING,_REMEMBERING_OR_MAKING_DECISIONS_DIFFICULTY: NO
ADLS_ACUITY_SCORE: 18
ADLS_ACUITY_SCORE: 18
TOILETING_ISSUES: NO
ADLS_ACUITY_SCORE: 35
DEPENDENT_IADLS:: INDEPENDENT

## 2022-09-12 ASSESSMENT — ENCOUNTER SYMPTOMS
SHORTNESS OF BREATH: 1
DIAPHORESIS: 1
ABDOMINAL PAIN: 0
CHEST TIGHTNESS: 1
FATIGUE: 1

## 2022-09-12 NOTE — H&P
"[facility]    History and Physical - Hospitalist Service       Date of Admission:  9/12/2022    Assessment & Plan      Carole Reich is a 61 year old female history of hypertension as well as phentermine use admitted to the hospital with increasing dyspnea on exertion and abnormal trip    NSTEMI  --- Concern for symptoms of increasing exertional chest pain with dyspnea on exertion and elevated troponin emergency room; differential also includes strain secondary to CHF  --- Repeat initial troponin here is at the upper limits of normal  --- Check BNP as well as echo given CTA findings of effusions and pulmonary edema  --- Continue heparin drip started at urgency room  --- N.p.o.  --- Cardiac telemetry  --- Cardiology consultation  --- Has had nitro x2.  If chest pain recurs would start nitro drip  --- Echo ordered  --- Stop phentermine  ---check flp and start statin    Hypertension  --- Patient stopped HCTZ due to concerns for hypotension.  --- Checking BNP.  If elevated will plan to start Lasix  --- Hold HCTZ for now    Thyroid nodule  --- 1 cm, seen on emergency room CTA  ---needs outpatient               Diet: NPO for Medical/Clinical Reasons Except for: No Exceptions    DVT Prophylaxis: on heparin gtt  Farr Catheter: Not present  Central Lines: None  Code Status:   full code    Clinically Significant Risk Factors Present on Admission                    # Overweight: Estimated body mass index is 29.21 kg/m  as calculated from the following:    Height as of this encounter: 1.6 m (5' 3\").    Weight as of this encounter: 74.8 kg (164 lb 14.5 oz).        Disposition Plan   Anticipate discharge in 2-3 days  The patient's care was discussed with the Patient and Patient's Family.    Joy Chaves MD  [facility]  Securely message with the Vocera Web Console (learn more here)  Text page via [employer] " [place]y      ______________________________________________________________________    Chief Complaint   Chest pain/LEE      History of Present Illness   Carole Reich is a 61 year old female with history of hypertension who came into the emergency room department for further work-up and evaluation of 6 days of worsening dyspnea on exertion and diaphoresis.  History is obtained from the patient and the presence of her .  She states that she was up at her cabin trying to trim hedges about 6 days ago when she noticed much more diaphoresis and dyspnea than she thought.  It was hot and she went in and rested.  She later became dizzy and noted systolic blood pressure in the 90s so stopped her blood pressure medications as well as the phentermine that she had been taking.  She then felt more short of breath than usual throughout the week felt more fatigued than usual.  Over the last 2 days she felt more chest discomfort.  She noted this discomfort when taking a walk although was able to paint without significant difficulty except mild dizziness.  She denies increase in lower extremity edema or bloating.      [place] about symptom persistence of dyspnea on exertion as well as mild chest pressure and went to urgency room.  In the emergency room troponin was found to be elevated to started on heparin drip and transferred here    Review of Systems    The 10 point Review of Systems is negative other than noted in the HPI or here.     Past Medical History    I have reviewed this patient's medical history and updated it with pertinent information if needed.   Past Medical History:   Diagnosis Date     Adenomatous colon polyp      Dysplastic nevi      Eyelid problem     excess loose skin     Herpes dermatitis      Hyperlipidemia      Migraine headache without aura      Nocturia      Snoring      Urinary frequency      Vitamin D deficiency        Past Surgical History    I have reviewed this patient's surgical history and updated it with pertinent information if needed.  Past Surgical History:   Procedure Laterality Date     APPENDECTOMY       APPENDECTOMY       BELPHAROPTOSIS REPAIR Bilateral 2017    Dr. Kerwin Aly     BREAST EXCISIONAL BIOPSY Right 2017    DR. CORRAL benign     BREAST SURGERY      biopsy     BUNIONECTOMY  2015     KNEE SURGERY  1978     ORTHOPEDIC SURGERY      knee surgery     ORTHOPEDIC SURGERY      bunionectomy     REPAIR PTOSIS BILATERAL Bilateral 2017    Procedure: REPAIR PTOSIS BILATERAL;  BILATERAL UPPER LID PTOSIS AND MECHANICAL PTOSIS REPAIR(MAC);  Surgeon: Ryder Suresh MD;  Location: [facility]       Social History   I have reviewed this patient's social history and updated it with pertinent information if needed.  Social History     Tobacco Use     Smoking status: Former Smoker     Years: 10.00     Types: Cigarettes, Cigarettes     Quit date: 1986     Years since quittin.7     Smokeless tobacco: Never Used   Substance Use Topics     Alcohol use: Yes     Alcohol/week: 6.0 standard drinks     Types: 6 Glasses of wine per week     Drug use: No       Family History   I have reviewed this patient's family history and updated it with pertinent information if needed.  Family History   Problem Relation Age of Onset     Hypertension Mother      Cancer Mother         Rectal     Asthma Mother      Hypothyroidism Mother      Depression Father      Alcoholism Father      Depression Sister      Colon Polyps Sister         Adenomatous     Prostate Cancer Brother      Anxiety Disorder Brother      Anxiety Disorder Brother      Cerebrovascular Disease Brother      Breast Cancer Paternal Grandmother      Colon Cancer Son 34     Thrombophilia Son         ITP     Colon Cancer Paternal Uncle      Colon Cancer Cousin        Prior to Admission Medications   Prior to Admission Medications   Prescriptions Last Dose  Informant Patient Reported? Taking?   Multiple Vitamins-Minerals (ONCOVITE) TABS Past Week at Unknown time  Yes Yes   Sig: Take 2 tablets by mouth daily   ZOLMitriptan (ZOMIG) 5 MG tablet Past Month at Unknown time  No Yes   Sig: Take 1 tablet (5 mg) by mouth at onset of headache for migraine May repeat in 2 hours. Max 2 tablets/24 hours.   acyclovir (ZOVIRAX) 400 MG tablet Unknown at Unknown time  Yes Yes   Sig: TAKE 1 TABLET BY MOUTH FIVE TIMES DAILY FOR 4 TO 5 DAYS AS NEEDED FOR COLD SORE   cetirizine (ZYRTEC) 10 MG tablet 9/11/2022 at AM  Yes Yes   Sig: Take 10 mg by mouth daily   hydrochlorothiazide (HYDRODIURIL) 25 MG tablet Past Week at Unknown time  No Yes   Sig: Take 1 tablet (25 mg) by mouth daily   phentermine (ADIPEX-P) 37.5 MG capsule Past Week at Unknown time  No Yes   Sig: TAKE 1 CAPSULE(37.5 MG) BY MOUTH EVERY MORNING   vitamin D3 (CHOLECALCIFEROL) 250 mcg (29411 units) capsule Past Week at Unknown time  Yes Yes   Sig: Take 2 capsules by mouth daily      Facility-Administered Medications: None     Allergies   Allergies   Allergen Reactions     Dog Hair [Dogs]      Dust Mites      Mold      Topiramate      Confusion, memory changes       Physical Exam   Vital Signs: Temp: 98.5  F (36.9  C) Temp src: Oral BP: 131/81 Pulse: 88   Resp: 18 SpO2: 97 % O2 Device: None (Room air)    Weight: 164 lbs 14.47 oz    General Appearance: Pleasant slightly anxious appearing female in no apparent distress  Eyes: Sclera nonicteric extraocular muscles intact  HEENT: Oropharynx moist, tongue midline  Respiratory: Relatively clear to auscultation anterior  Cardiovascular: Regular rate and rhythm with a 2 out of 6 to 3 out of 6 systolic murmur  GI: Soft and nontender without hepatosplenomegaly  Skin: Trace lower extremity edema bilaterally  Musculoskeletal: No significant joint effusions noted  Neurologic: Neurologically grossly intact without focal deficits appreciated.    Data   Data reviewed today: I reviewed all  medications, new labs and imaging results over the last 24 hours. I personally reviewed the EKG tracing showing Normal sinus rhythm.  No acute ST changes.  QTc 460.    No orders to display       Recent Results (from the past 24 hour(s))   COVID-19 VIRUS (CORONAVIRUS) BY PCR (EXTERNAL RESULT)    Collection Time: 09/12/22  9:36 AM   Result Value Ref Range    COVID-19 Virus by PCR (External Result) Negative Negative   Troponin I    Collection Time: 09/12/22 12:27 PM   Result Value Ref Range    Troponin I 0.27 0.00 - 0.29 ng/mL   CBC with platelets    Collection Time: 09/12/22 12:27 PM   Result Value Ref Range    WBC Count 11.4 (H) 4.0 - 11.0 10e3/uL    RBC Count 4.13 3.80 - 5.20 10e6/uL    Hemoglobin 12.4 11.7 - 15.7 g/dL    Hematocrit 39.0 35.0 - 47.0 %    MCV 94 78 - 100 fL    MCH 30.0 26.5 - 33.0 pg    MCHC 31.8 31.5 - 36.5 g/dL    RDW 13.7 10.0 - 15.0 %    Platelet Count 264 150 - 450 10e3/uL     SODIUM 137 - 145 mmol/L 141    POTASSIUM 3.5 - 5.1 mmol/L 4.0    CHLORIDE 98 - 107 mmol/L 110 High     CO2,TOTAL 22 - 30 mmol/L 26    ANION GAP 8 - 12 5 Low     GLUCOSE,RANDOM 74 - 106 mg/dL 108 High     CALCIUM 8.4 - 10.2 mg/dL 8.8    BUN 7 - 17 mg/dL 24 High     CREATININE 0.52 - 1.04 mg/dL 0.70    BUN/CREAT RATIO 10 - 20 34 High     eGFR >90 mL/min/1.73m2 >90      Patient has mild changes of failure with trace effusions and mild pulmonary edema.   2.  No evidence of pulmonary emboli.   3.  There is a 1 cm left thyroid nodule. This needs no additional follow-up. Reference given below.     REFERENCE:   Richard FOX et al. Managing Incidental Thyroid Nodules Detected on Imaging: White Paper of the ACR Incidental Thyroid Findings Committee. JACR 2015; 12:143-150.     Incidental thyroid nodule detected on CT or MRI without suspicious findings. Applies to general population without limited life expectancy or significant comorbidities.     Age greater than or equal to 35 years   Less than 1.5 cm: No further evaluation.   Greater  than or equal to 1.5 cm: Evaluate with thyroid ultrasound.    Narrative    For Patients: As a result of the 21st Century Cures Act, medical imaging exams and procedure reports are released immediately into your electronic medical record. You may view this report before your referring provider. If you have questions, please contact your health care provider.     EXAM: CTA CHEST   LOCATION: [facility] [place]   DATE/TIME: 9/12/2022 10:40 AM     INDICATION: Chest pain   COMPARISON: None.   TECHNIQUE: CT chest pulmonary angiogram during arterial phase injection of IV contrast. Multiplanar reformats and MIP reconstructions were performed. Dose reduction techniques were used.   CONTRAST: IOPAMIDOL 300 MG/ML  ML BOTTLE: 100mL     FINDINGS:   ANGIOGRAM CHEST: Excellent opacification of pulmonary arteries and branches. No evidence of pulmonary emboli. Thoracic aorta is normal caliber.     LUNGS AND PLEURA: There are trace, bilateral pleural effusions, mild interlobular septal thickening and scattered, subpleural groundglass opacities. Minimal basilar atelectatic lung enhances normally. Minimal subpleural fibroatelectasis posteriorly in the right upper lobe. No suspicious lesions.     MEDIASTINUM/AXILLAE: There is a 1 cm left thyroid nodule. No adenopathy.     CORONARY ARTERY CALCIFICATION: None.     UPPER ABDOMEN: Normal.     MUSCULOSKELETAL: No suspicious lesions.

## 2022-09-12 NOTE — ED TRIAGE NOTES
Triage Assessment     Row Name 09/12/22 1201       Triage Assessment (Adult)    Airway WDL WDL       Respiratory WDL    Respiratory WDL WDL       Cardiac WDL    Cardiac WDL chest pain       Peripheral/Neurovascular WDL    Peripheral Neurovascular WDL WDL       Cognitive/Neuro/Behavioral WDL    Cognitive/Neuro/Behavioral WDL WDL

## 2022-09-12 NOTE — ED TRIAGE NOTES
Patient arrives to ED by [facility] from urgent care clinic with c/o chest discomfort. EMS reports that she was treated at urgent care clinic with 324mg aspirin, 4,000 units heparin, and 2 ,nitroglycerin and patient states she feels a little tightness but not pain.     Triage Assessment     Row Name 09/12/22 1201       Triage Assessment (Adult)    Airway WDL WDL       Respiratory WDL    Respiratory WDL WDL       Cardiac WDL    Cardiac WDL chest pain       Peripheral/Neurovascular WDL    Peripheral Neurovascular WDL WDL       Cognitive/Neuro/Behavioral WDL    Cognitive/Neuro/Behavioral WDL WDL

## 2022-09-12 NOTE — ED NOTES
Expected Patient Referral to ED  11:17 AM    Referring Clinic/Provider:  [facility]    Reason for referral/Clinical facts:  Chest pain and dyspnea on exertion. NSTEMI, mild CHF. Sx for about 5 days. Trop 0.0188. CTA chest negative for PE, shows mild CHF. Gave full dose aspirin. Loading dose of heparin will be given. Coming by ambulance    Recommendations provided:  Send to ED for further evaluation    Caller was informed that this institution does possess the capabilities and/or resources to provide for patient and should be transferred to our facility.    Discussed that if direct admit is sought and any hurdles are encountered, this ED would be happy to see the patient and evaluate.    Informed caller that recommendations provided are recommendations based only on the facts provided and that they responsible to accept or reject the advice, or to seek a formal in person consultation as needed and that this ED will see/treat patient should they arrive.      Kip Flood MD  [facility] EMERGENCY DEPARTMENT  [address] [place] [id number] [phone]       Kip Flood MD  09/12/22 [id number]

## 2022-09-12 NOTE — CONSULTS
CARDIOLOGY CONSULT NOTE         Assessment:   1.  Troponin elevation-high-sensitivity at outside hospital was elevated at 0.188 with upper limit of normal 0.027.  Her troponin is normal.  We will recheck troponin, check echo, if troponin is normal doubt this is an acute event.  2.  NSTEMI (non-ST elevated myocardial infarction) (H)-as above, not sure if this is an acute coronary syndrome due to troponin elevation and will recheck troponin.  Suspect this could be an element of heart failure acute on chronic in the setting of preserved ejection fraction.  We will check echo as well as BNP.  3.  Shortness of breath-suspect this is heart failure, possibly acute in the setting preserved ejection fraction due to discontinuation of HCTZ in the setting of hypotension.  Will check BNP and also get some low-dose diuretic.     Plan:   1.  Check BNP.  2.  Low-dose furosemide.  3.  Exercise stress nuclear tomorrow, in case unable to exercise will then switch over to pharmacological stress nuclear.  4.  Can follow with me as primary cardiologist.     Current History:   [facility] by Dr. Joy Chaves to evaluate Carole Reich  who is a 61 year old year old white female for positive troponin.  Patient about a week or so ago was out working in the yard on a very hot day.  She became somewhat dehydrated and dizzy and lightheaded.  She stopped her HCTZ at that time.  Subsequently since then, she has felt more short of breath with a tightness in her chest.  This is worsened over the last 2 to 3 days to the point she had it while lying in bed last night.  This prompted her to present to the emergency room where she was noted to have borderline increased troponin and sent over to the [facility] emergency department.  Discomfort for the most part is resolved but she feels extremely fatigued and cardiology consultation is requested.    Past Medical History:     Past Medical History:   Diagnosis Date      Adenomatous colon polyp      Dysplastic nevi      Eyelid problem     excess loose skin     Herpes dermatitis      Hyperlipidemia  Benign essential hypertension      Migraine headache without aura      Nocturia      Snoring      Urinary frequency      Vitamin D deficiency       Past history is negative for cancer, tuberculosis, diabetes mellitus, myocardial infarction,  rheumatic fever cerebrovascular accident, chronic kidney disease, peptic ulcer disease, chronic obstructive pulmonary disease, or thyroid disorder.    Past Surgical History:     Past Surgical History:   Procedure Laterality Date     APPENDECTOMY       APPENDECTOMY  1978     BELPHAROPTOSIS REPAIR Bilateral 12/28/2017    Dr. Kerwin Aly     BREAST EXCISIONAL BIOPSY Right 02/16/2017    DR. CORRAL benign     BREAST SURGERY      biopsy     BUNIONECTOMY  Feb. 2015     KNEE SURGERY  1978     ORTHOPEDIC SURGERY      knee surgery     ORTHOPEDIC SURGERY      bunionectomy     REPAIR PTOSIS BILATERAL Bilateral 12/28/2017    Procedure: REPAIR PTOSIS BILATERAL;  BILATERAL UPPER LID PTOSIS AND MECHANICAL PTOSIS REPAIR(MAC);  Surgeon: Ryder Suresh MD;  Location: [facility]     Family History:   Reviewed, and high lipids in brother and mother.  Negative for coronary artery disease.    Social History:   Reviewed, and she  reports that she quit smoking about 36 years ago. Her smoking use included cigarettes and cigarettes. She quit after 10.00 years of use. She has never used smokeless tobacco. She reports current alcohol use of about 6.0 standard drinks of alcohol per week. She reports that she does not use drugs.  She lives independently with her , works as a  at a desk job, and primary physician is Dr. Minerva Lockhart.    Meds:       atorvastatin  10 mg Oral QPM     cetirizine  10 mg Oral Daily     docusate sodium  100 mg Oral BID     [Held by provider] hydrochlorothiazide  25 mg Oral Daily     sodium chloride (PF)  3  "mL Intracatheter Q8H       Allergies:   Dog hair [dogs], Dust mites, Mold, and Topiramate    Review of Systems:   A 12 point comprehensive review of systems was  as follows:   General: Positive for fatigue, negative weight loss or weight gain  Constitutional: negative for anorexia, chills, fevers, malaise, night sweats   Eyes: negative for cataracts, color blindness, contacts/glasses, glaucoma, icterus, irritation, redness and visual disturbance  Ears, nose, mouth, throat, and face: negative for ear drainage, earaches, epistaxis, facial trauma, hearing loss, hoarseness, nasal congestion, snoring, sore mouth, sore throat, tinnitus and voice change  Respiratory: negative for cough, dyspnea on exertion,  hemoptysis, sputum production, pleurisy, stridor, wheezing, PND, orthopnea  Cardiovascular:positive for chest pain, chest pressure/discomfort, negative claudication, dyspnea, exertional chest pressure/discomfort, fatigue, irregular heart beat, lower extremity edema, near-syncope,  palpitations,  syncope   Gastrointestinal: negative for abdominal pain, change in bowel haibits, constipation, diarrhea, dyspepsia, dysphagia, jaundice, melena, nausea, odynophagia, reflux symptoms and vomiting  Genitourinary: negative for decreased stream  Hematologic/lymphatic: negative for bleeding  Musculoskeletal: negative for arthralgias, back pain, bone pain, muscle weakness, myalgias, neck pain and stiff joints  Neurological: negative for syncope, presyncope, coordination problems, dizziness, gait problems, headaches, memory problems, paresthesia, seizures, speech problems, tremors, vertigo and weakness    Objective:     Physical Exam:  BP (!) 148/78   Pulse 87   Temp 98.5  F (36.9  C) (Oral)   Resp 19   Ht 1.6 m (5' 3\")   Wt 74.8 kg (164 lb 14.5 oz)   SpO2 96%   BMI 29.21 kg/m       Head:    Normocephalic, without obvious abnormality, atraumatic   Eyes:    PERRL, conjunctiva/corneas clear, EOM's intact,           Nose:   Nares " normal, septum midline, mucosa normal, no drainage  or sinus tenderness   Throat:   Lips, mucosa, and tongue normal; teeth and gums normal   Neck:   Supple, symmetrical, trachea midline, no adenopathy;        thyroid:  No enlargement/tenderness/nodules; no carotid    bruit or JVD   Back:     Symmetric, no curvature, ROM normal, no CVA tenderness   Lungs:     Clear to auscultation bilaterally, respirations unlabored   Chest wall:    No tenderness or deformity   Heart:    Regular rate and rhythm, S1 and S2 normal, no murmur, rub   or gallop   Abdomen:     Soft, non-tender, bowel sounds active all four quadrants,     no masses, no organomegaly   Extremities:   Extremities normal, atraumatic, no cyanosis or edema   Pulses:   2+ and symmetric all extremities   Skin:   Skin color, texture, turgor normal, no rashes or lesions   Neurologic:   CNII-XII intact. Normal strength, sensation and reflexes       throughout     Cardiographics and Imaging  Radiology Results: Chest x-ray shows   1.  Patient has mild changes of failure with trace effusions and mild pulmonary edema.   2.  No evidence of pulmonary emboli.   3.  There is a 1 cm left thyroid nodule. This needs no additional follow-up. Reference given below.       EKG Results: personally reviewed sinus rhythm, indeterminate axis, incomplete right bundle branch block pattern.    Lab Review   Pertinent Labs  Lab Results: personally reviewed       Lab Results   Component Value Date    TROPONINI 0.27 09/12/2022       Lab Results   Component Value Date    BUN 20 09/22/2021     09/22/2021    CO2 29 09/22/2021       Lab Results   Component Value Date    WBC 11.4 (H) 09/12/2022    HGB 12.4 09/12/2022    HCT 39.0 09/12/2022    MCV 94 09/12/2022     09/12/2022       No results found for: BNP     Clinically Significant Risk Factors Present on Admission               # Overweight: Estimated body mass index is 29.21 kg/m  as calculated from the following:    Height as of  "this encounter: 1.6 m (5' 3\").    Weight as of this encounter: 74.8 kg (164 lb 14.5 oz).                  Systemic: Chronic Fatigue and Other Debilities: Age-related physical debility          "

## 2022-09-12 NOTE — ED PROVIDER NOTES
EMERGENCY DEPARTMENT ENCOUNTER      NAME: Carole Reich  AGE: 61 year old female  YOB: 1960  MRN: [id number]  EVALUATION DATE & TIME: 9/12/2022 11:58 AM    PCP: Minerva Lockhart    ED PROVIDER: Kip Flood M.D.      Chief Complaint   Patient presents with     Chest Pain         FINAL IMPRESSION:  1. NSTEMI (non-ST elevated myocardial infarction) (H)          ED COURSE & MEDICAL DECISION MAKING:    Pertinent Labs & Imaging studies reviewed. (See chart for details)  ED Course as of 09/12/22 1407   Mon Sep 12, 2022   1233 Patient is a 61-year-old woman, otherwise healthy, here after diagnosis of NSTEMI in the emergency room and sent here for hospitalization.  She has had exertional shortness of breath for little over a week, chest pressure has been prominent over the past couple days.  She has an elevated troponin, but nonischemic EKG earlier today.  She has been given heparin bolus, aspirin.  She has been given 2 doses of nitroglycerin which helped with the pain both times.  On exam she is pain-free.  Normal exam.   1235 EKG shows sinus rhythm with a rate of 94.  No acute ischemic ST or T wave morphology.  Right axis deviation.  Septal T wave inversions.  When compared to prior EKG earlier today in clinic, there is no significant change.  Impression: Sinus rhythm, nonspecific septal T wave inversions   1311 Troponin I: 0.27  Just below threshold    1406 Patient is started on heparin, blood pressures have normalized, now about 130 systolic.  Troponin here is 0.27, with likely serial troponins in the works.  Admitted to hospital for NSTEMI/unstable angina.         Additional ED Course Timestamps:  12:05 PM I met with the patient and performed my initial interview and exam   12:53 PM I spoke with Dr. Kincaid, Cardiology, regarding the patient's care   1:05 PM I spoke with Dr. Joy Chaves, Hospitalist, who agreed to accept the patient for admission   At the conclusion of the encounter I discussed  "the results of all of the tests and the disposition. The questions were answered. The patient or family acknowledged understanding and was agreeable with the care plan.       60 minutes of critical care time for management of NSTEMI with close cardiac monitoring, repeat examinations, cardiology consultation, initiation of heparin drip    MEDICATIONS GIVEN IN THE EMERGENCY:  Medications   heparin infusion 25,000 units in D5W 250 mL ANTICOAGULANT (900 Units/hr Intravenous New Bag 9/12/22 1255)         NEW PRESCRIPTIONS STARTED AT TODAY'S ER VISIT  New Prescriptions    No medications on file          =================================================================    HPI    Patient information was obtained from: Patient     Use of : N/A         Carole Reich is a 61 year old female with a pertinent history of HLD who presents to this ED for evaluation of chest pain     Per chart review: Patient was seen at [facility] in [place] with chest pain. A CT scan was performed and was negative for a PE. Her blood results showed a hemoglobin of 13.3, platelets of 236, and a troponin of 0.188. She was diagnosed with a NSTEMI and treated with 0.4mg of nitroglycerin, 600mg of ibuprofen, 4,000mg of heparin, and a full aspirin. She was transported to the ED via EMS and was given an additional dose of nitroglycerin en route.     Patient reports that 3 days ago she was \"coughing uncontrollably\" and felt an onset of chest pain and tightness that \"felt like someone was sitting on my chest\". She describes the pain as a pressure in her chest that comes and goes, but feels like a 8-9/10 at its worst. Prior to this event, on 9/6 the patient was trimming hedges at her cabin and \"got winded\" and \"had to rest for 30 minutes\" and was \"sweating like crazy\".  She notes that her chest pain started a few days later. Since then, she has been feeling \"run down and tired\" while working outside. Patient reports that her " shortness of breath got worse yesterday (9/11). Patient notes that she currently is not experiencing the chest pain and that the nitroglycerin helped. Patient denies smoking, abdominal pain, leg swelling, or any other symptoms or complaints.                REVIEW OF SYSTEMS   Review of Systems   Constitutional: Positive for diaphoresis and fatigue.   Respiratory: Positive for chest tightness and shortness of breath.    Cardiovascular: Positive for chest pain. Negative for leg swelling.   Gastrointestinal: Negative for abdominal pain.   All other systems reviewed and are negative.      PAST MEDICAL HISTORY:  Past Medical History:   Diagnosis Date     Adenomatous colon polyp      Dysplastic nevi      Eyelid problem     excess loose skin     Herpes dermatitis      Hyperlipidemia      Migraine headache without aura      Nocturia      Snoring      Urinary frequency      Vitamin D deficiency        PAST SURGICAL HISTORY:  Past Surgical History:   Procedure Laterality Date     APPENDECTOMY       APPENDECTOMY  1978     BELPHAROPTOSIS REPAIR Bilateral 12/28/2017    [place] Dr. Kerwin Siegel     BREAST EXCISIONAL BIOPSY Right 02/16/2017    DR. CORRAL benign     BREAST SURGERY      biopsy     BUNIONECTOMY  Feb. 2015     KNEE SURGERY  1978     ORTHOPEDIC SURGERY      knee surgery     ORTHOPEDIC SURGERY      bunionectomy     REPAIR PTOSIS BILATERAL Bilateral 12/28/2017    Procedure: REPAIR PTOSIS BILATERAL;  BILATERAL UPPER LID PTOSIS AND MECHANICAL PTOSIS REPAIR(MAC);  Surgeon: Ryder Suresh MD;  Location: [facility]           CURRENT MEDICATIONS:    Current Facility-Administered Medications   Medication     heparin infusion 25,000 units in D5W 250 mL ANTICOAGULANT     Current Outpatient Medications   Medication     acyclovir (ZOVIRAX) 400 MG tablet     cetirizine (ZYRTEC) 10 MG tablet     hydrochlorothiazide (HYDRODIURIL) 25 MG tablet     Multiple Vitamins-Minerals (ONCOVITE) TABS     phentermine (ADIPEX-P) 37.5 MG  "capsule     vitamin D3 (CHOLECALCIFEROL) 250 mcg (11894 units) capsule     ZOLMitriptan (ZOMIG) 5 MG tablet       ALLERGIES:  Allergies   Allergen Reactions     Dog Hair [Dogs]      Dust Mites      Mold      Topiramate      Confusion, memory changes       FAMILY HISTORY:  Family History   Problem Relation Age of Onset     Hypertension Mother      Cancer Mother         Rectal     Asthma Mother      Hypothyroidism Mother      Depression Father      Alcoholism Father      Depression Sister      Colon Polyps Sister         Adenomatous     Prostate Cancer Brother      Anxiety Disorder Brother      Anxiety Disorder Brother      No Known Problems Brother      Breast Cancer Paternal Grandmother      Colon Cancer Son 34     Thrombophilia Son         ITP     Colon Cancer Paternal Uncle      Colon Cancer Cousin        SOCIAL HISTORY:   Social History     Socioeconomic History     Marital status:      Spouse name: None     Number of children: 1     Years of education: None     Highest education level: None   Tobacco Use     Smoking status: Former Smoker     Years: 10.00     Types: Cigarettes, Cigarettes     Quit date: 1986     Years since quittin.7     Smokeless tobacco: Never Used   Substance and Sexual Activity     Alcohol use: Yes     Alcohol/week: 2.0 standard drinks     Drug use: No       VITALS:  /81   Pulse 88   Temp 98.5  F (36.9  C) (Oral)   Resp 18   Ht 1.6 m (5' 3\")   Wt 74.8 kg (164 lb 14.5 oz)   SpO2 97%   BMI 29.21 kg/m      PHYSICAL EXAM    Constitutional: Well developed, well nourished. Comfortable appearing.  HENT: Normocephalic, atraumatic, mucous membranes moist, nose normal. Neck- Supple, gross ROM intact.   Eyes: Pupils mid-range, conjunctiva without injection, no discharge.   Respiratory: Clear to auscultation bilaterally, no respiratory distress, no wheezing, speaks full sentences easily. No cough.  Cardiovascular: Normal heart rate, regular rhythm, no murmurs.   GI: Soft, no " tenderness to deep palpation in all quadrants, no masses.  Musculoskeletal: Moving all 4 extremities intentionally and without pain. No obvious deformity.  Skin: Warm, dry, no rash.  Neurologic: Alert & oriented x 3, cranial nerves grossly intact.  Psychiatric: Affect normal, cooperative.       LAB:  All pertinent labs reviewed and interpreted.  Labs Ordered and Resulted from Time of ED Arrival to Time of ED Departure   CBC WITH PLATELETS - Abnormal       Result Value    WBC Count 11.4 (*)     RBC Count 4.13      Hemoglobin 12.4      Hematocrit 39.0      MCV 94      MCH 30.0      MCHC 31.8      RDW 13.7      Platelet Count 264     TROPONIN I - Normal    Troponin I 0.27     HEPARIN UNFRACTIONATED ANTI XA LEVEL       RADIOLOGY:  Reviewed all pertinent imaging. Please see official radiology report.  No orders to display       EKG:    All EKG interpretations will be found in ED course above.        I, Jesika Crespo am serving as a scribe to document services personally performed by Dr. Kip Flood based on my observation and the provider's statements to me. I, Kip Flood MD attest that Jesika Crespo is acting in a scribe capacity, has observed my performance of the services and has documented them in accordance with my direction.    Kip Flood M.D.  Emergency Medicine  [facility] EMERGENCY DEPARTMENT  [facility] [facility] [id number] [phone]  Dept: [phone]     Kip Flood MD  09/12/22 1465

## 2022-09-12 NOTE — PHARMACY-ADMISSION MEDICATION HISTORY
Pharmacy Note - Admission Medication History    Pertinent Provider Information: Patient reports that they stopped taking most of their meds except for Zyrtec last Tuesday due to not feeling well.      ______________________________________________________________________    Prior To Admission (PTA) med list completed and updated in EMR.       PTA Med List   Medication Sig Note Last Dose     acyclovir (ZOVIRAX) 400 MG tablet TAKE 1 TABLET BY MOUTH FIVE TIMES DAILY FOR 4 TO 5 DAYS AS NEEDED FOR COLD SORE  Unknown at Unknown time     cetirizine (ZYRTEC) 10 MG tablet Take 10 mg by mouth daily  9/11/2022 at AM     hydrochlorothiazide (HYDRODIURIL) 25 MG tablet Take 1 tablet (25 mg) by mouth daily 9/12/2022: Last dose on Tuesday Past Week at Unknown time     Multiple Vitamins-Minerals (ONCOVITE) TABS Take 2 tablets by mouth daily  Past Week at Unknown time     phentermine (ADIPEX-P) 37.5 MG capsule TAKE 1 CAPSULE(37.5 MG) BY MOUTH EVERY MORNING 9/12/2022: Last dose Tuesday Past Week at Unknown time     vitamin D3 (CHOLECALCIFEROL) 250 mcg (57049 units) capsule Take 2 capsules by mouth daily  Past Week at Unknown time     ZOLMitriptan (ZOMIG) 5 MG tablet Take 1 tablet (5 mg) by mouth at onset of headache for migraine May repeat in 2 hours. Max 2 tablets/24 hours.  Past Month at Unknown time       Information source(s): Patient, Family member and [facility]  Method of interview communication: in-person    Summary of Changes to PTA Med List  New: Acyclovir, Zyrtec  Discontinued: Valacyclovir  Changed: None    Patient was asked about OTC/herbal products specifically.  PTA med list reflects this.    In the past week, patient estimated taking medication this percent of the time:  50-90% due to illness.    Allergies were reviewed, assessed, and updated with the patient.      Patient did not bring any medications to the hospital and can't retrieve from home. No multi-dose medications are available for use during  hospital stay.     The information provided in this note is only as accurate as the sources available at the time of the update(s).    Thank you for the opportunity to participate in the care of this patient.    JANINA MURCIA GONZALEZ  9/12/2022 1:27 PM

## 2022-09-13 ENCOUNTER — APPOINTMENT (OUTPATIENT)
Dept: CARDIOLOGY | Facility: HOSPITAL | Age: 62
DRG: 291 | End: 2022-09-13
Attending: INTERNAL MEDICINE
Payer: COMMERCIAL

## 2022-09-13 ENCOUNTER — APPOINTMENT (OUTPATIENT)
Dept: NUCLEAR MEDICINE | Facility: HOSPITAL | Age: 62
DRG: 291 | End: 2022-09-13
Attending: INTERNAL MEDICINE
Payer: COMMERCIAL

## 2022-09-13 ENCOUNTER — APPOINTMENT (OUTPATIENT)
Dept: CARDIOLOGY | Facility: HOSPITAL | Age: 62
DRG: 291 | End: 2022-09-13
Attending: FAMILY MEDICINE
Payer: COMMERCIAL

## 2022-09-13 LAB
ANION GAP SERPL CALCULATED.3IONS-SCNC: 9 MMOL/L (ref 5–18)
BUN SERPL-MCNC: 19 MG/DL (ref 8–22)
CALCIUM SERPL-MCNC: 8.6 MG/DL (ref 8.5–10.5)
CHLORIDE BLD-SCNC: 110 MMOL/L (ref 98–107)
CHOLEST SERPL-MCNC: 210 MG/DL
CO2 SERPL-SCNC: 24 MMOL/L (ref 22–31)
CREAT SERPL-MCNC: 0.69 MG/DL (ref 0.6–1.1)
CV STRESS CURRENT BP HE: NORMAL
CV STRESS CURRENT HR HE: 102
CV STRESS CURRENT HR HE: 103
CV STRESS CURRENT HR HE: 105
CV STRESS CURRENT HR HE: 106
CV STRESS CURRENT HR HE: 107
CV STRESS CURRENT HR HE: 107
CV STRESS CURRENT HR HE: 108
CV STRESS CURRENT HR HE: 83
CV STRESS CURRENT HR HE: 87
CV STRESS CURRENT HR HE: 88
CV STRESS CURRENT HR HE: 91
CV STRESS CURRENT HR HE: 95
CV STRESS CURRENT HR HE: 95
CV STRESS CURRENT HR HE: 96
CV STRESS CURRENT HR HE: 97
CV STRESS CURRENT HR HE: 98
CV STRESS DEVIATION TIME HE: NORMAL
CV STRESS ECHO PERCENT HR HE: NORMAL
CV STRESS EXERCISE STAGE HE: NORMAL
CV STRESS FINAL RESTING BP HE: NORMAL
CV STRESS FINAL RESTING HR HE: 95
CV STRESS MAX HR HE: 108
CV STRESS MAX TREADMILL GRADE HE: 0
CV STRESS MAX TREADMILL SPEED HE: 0
CV STRESS PEAK DIA BP HE: NORMAL
CV STRESS PEAK SYS BP HE: NORMAL
CV STRESS PHASE HE: NORMAL
CV STRESS PROTOCOL HE: NORMAL
CV STRESS RESTING PT POSITION HE: NORMAL
CV STRESS RESTING PT POSITION HE: NORMAL
CV STRESS ST DEVIATION AMOUNT HE: NORMAL
CV STRESS ST DEVIATION ELEVATION HE: NORMAL
CV STRESS ST EVELATION AMOUNT HE: NORMAL
CV STRESS TEST TYPE HE: NORMAL
CV STRESS TOTAL STAGE TIME MIN 1 HE: NORMAL
ERYTHROCYTE [DISTWIDTH] IN BLOOD BY AUTOMATED COUNT: 13.7 % (ref 10–15)
GFR SERPL CREATININE-BSD FRML MDRD: >90 ML/MIN/1.73M2
GLUCOSE BLD-MCNC: 102 MG/DL (ref 70–125)
HCT VFR BLD AUTO: 36.4 % (ref 35–47)
HDLC SERPL-MCNC: 57 MG/DL
HGB BLD-MCNC: 12 G/DL (ref 11.7–15.7)
LDLC SERPL CALC-MCNC: 127 MG/DL
LVEF ECHO: NORMAL
MCH RBC QN AUTO: 30.6 PG (ref 26.5–33)
MCHC RBC AUTO-ENTMCNC: 33 G/DL (ref 31.5–36.5)
MCV RBC AUTO: 93 FL (ref 78–100)
PLATELET # BLD AUTO: 231 10E3/UL (ref 150–450)
POTASSIUM BLD-SCNC: 3.6 MMOL/L (ref 3.5–5)
RATE PRESSURE PRODUCT: NORMAL
RBC # BLD AUTO: 3.92 10E6/UL (ref 3.8–5.2)
SODIUM SERPL-SCNC: 143 MMOL/L (ref 136–145)
STRESS ECHO BASELINE DIASTOLIC HE: 83
STRESS ECHO BASELINE HR: 83
STRESS ECHO BASELINE SYSTOLIC BP: 132
STRESS ECHO CALCULATED PERCENT HR: 69 %
STRESS ECHO LAST STRESS DIASTOLIC BP: 87
STRESS ECHO LAST STRESS HR: 106
STRESS ECHO LAST STRESS SYSTOLIC BP: 146
STRESS ECHO POST ESTIMATED WORKLOAD: 1
STRESS ECHO POST EXERCISE DUR MIN: 4
STRESS ECHO POST EXERCISE DUR SEC: 0
STRESS ECHO TARGET HR: 157
TRIGL SERPL-MCNC: 132 MG/DL
TROPONIN I SERPL-MCNC: 0.16 NG/ML (ref 0–0.29)
TROPONIN I SERPL-MCNC: 0.19 NG/ML (ref 0–0.29)
UFH PPP CHRO-ACNC: 0.37 IU/ML
WBC # BLD AUTO: 7 10E3/UL (ref 4–11)

## 2022-09-13 PROCEDURE — 78452 HT MUSCLE IMAGE SPECT MULT: CPT | Mod: 26 | Performed by: INTERNAL MEDICINE

## 2022-09-13 PROCEDURE — 343N000001 HC RX 343: Performed by: FAMILY MEDICINE

## 2022-09-13 PROCEDURE — 99232 SBSQ HOSP IP/OBS MODERATE 35: CPT | Mod: 25 | Performed by: INTERNAL MEDICINE

## 2022-09-13 PROCEDURE — 250N000013 HC RX MED GY IP 250 OP 250 PS 637: Performed by: INTERNAL MEDICINE

## 2022-09-13 PROCEDURE — 36415 COLL VENOUS BLD VENIPUNCTURE: CPT | Performed by: FAMILY MEDICINE

## 2022-09-13 PROCEDURE — 250N000011 HC RX IP 250 OP 636: Performed by: INTERNAL MEDICINE

## 2022-09-13 PROCEDURE — 93018 CV STRESS TEST I&R ONLY: CPT | Performed by: INTERNAL MEDICINE

## 2022-09-13 PROCEDURE — 93017 CV STRESS TEST TRACING ONLY: CPT

## 2022-09-13 PROCEDURE — 255N000002 HC RX 255 OP 636: Performed by: FAMILY MEDICINE

## 2022-09-13 PROCEDURE — 84484 ASSAY OF TROPONIN QUANT: CPT | Performed by: FAMILY MEDICINE

## 2022-09-13 PROCEDURE — 999N000208 ECHOCARDIOGRAM COMPLETE

## 2022-09-13 PROCEDURE — 210N000001 HC R&B IMCU HEART CARE

## 2022-09-13 PROCEDURE — 85027 COMPLETE CBC AUTOMATED: CPT | Performed by: FAMILY MEDICINE

## 2022-09-13 PROCEDURE — 36415 COLL VENOUS BLD VENIPUNCTURE: CPT | Performed by: STUDENT IN AN ORGANIZED HEALTH CARE EDUCATION/TRAINING PROGRAM

## 2022-09-13 PROCEDURE — 93017 CV STRESS TEST TRACING ONLY: CPT | Performed by: INTERNAL MEDICINE

## 2022-09-13 PROCEDURE — 93016 CV STRESS TEST SUPVJ ONLY: CPT | Performed by: INTERNAL MEDICINE

## 2022-09-13 PROCEDURE — 80048 BASIC METABOLIC PNL TOTAL CA: CPT | Performed by: FAMILY MEDICINE

## 2022-09-13 PROCEDURE — 78452 HT MUSCLE IMAGE SPECT MULT: CPT

## 2022-09-13 PROCEDURE — 93306 TTE W/DOPPLER COMPLETE: CPT | Mod: 26 | Performed by: INTERNAL MEDICINE

## 2022-09-13 PROCEDURE — 80061 LIPID PANEL: CPT | Performed by: FAMILY MEDICINE

## 2022-09-13 PROCEDURE — 99233 SBSQ HOSP IP/OBS HIGH 50: CPT | Performed by: FAMILY MEDICINE

## 2022-09-13 PROCEDURE — 250N000013 HC RX MED GY IP 250 OP 250 PS 637: Performed by: FAMILY MEDICINE

## 2022-09-13 PROCEDURE — A9500 TC99M SESTAMIBI: HCPCS | Performed by: FAMILY MEDICINE

## 2022-09-13 PROCEDURE — 85520 HEPARIN ASSAY: CPT | Performed by: STUDENT IN AN ORGANIZED HEALTH CARE EDUCATION/TRAINING PROGRAM

## 2022-09-13 RX ORDER — ALBUTEROL SULFATE 0.83 MG/ML
2.5 SOLUTION RESPIRATORY (INHALATION)
Status: ACTIVE | OUTPATIENT
Start: 2022-09-13 | End: 2022-09-13

## 2022-09-13 RX ORDER — FUROSEMIDE 10 MG/ML
20 INJECTION INTRAMUSCULAR; INTRAVENOUS ONCE
Status: COMPLETED | OUTPATIENT
Start: 2022-09-13 | End: 2022-09-13

## 2022-09-13 RX ORDER — POTASSIUM CHLORIDE 1500 MG/1
20 TABLET, EXTENDED RELEASE ORAL ONCE
Status: COMPLETED | OUTPATIENT
Start: 2022-09-13 | End: 2022-09-13

## 2022-09-13 RX ORDER — CAFFEINE 200 MG
200 TABLET ORAL
Status: ACTIVE | OUTPATIENT
Start: 2022-09-13 | End: 2022-09-13

## 2022-09-13 RX ORDER — REGADENOSON 0.08 MG/ML
0.4 INJECTION, SOLUTION INTRAVENOUS ONCE
Status: COMPLETED | OUTPATIENT
Start: 2022-09-13 | End: 2022-09-13

## 2022-09-13 RX ORDER — AMINOPHYLLINE 25 MG/ML
50 INJECTION, SOLUTION INTRAVENOUS
Status: ACTIVE | OUTPATIENT
Start: 2022-09-13 | End: 2022-09-13

## 2022-09-13 RX ORDER — CAFFEINE CITRATE 20 MG/ML
60 SOLUTION INTRAVENOUS
Status: ACTIVE | OUTPATIENT
Start: 2022-09-13 | End: 2022-09-13

## 2022-09-13 RX ADMIN — REGADENOSON 0.4 MG: 0.08 INJECTION, SOLUTION INTRAVENOUS at 08:34

## 2022-09-13 RX ADMIN — ATORVASTATIN CALCIUM 10 MG: 10 TABLET, FILM COATED ORAL at 21:09

## 2022-09-13 RX ADMIN — ACETAMINOPHEN 650 MG: 325 TABLET, FILM COATED ORAL at 13:47

## 2022-09-13 RX ADMIN — FUROSEMIDE 20 MG: 10 INJECTION, SOLUTION INTRAMUSCULAR; INTRAVENOUS at 05:17

## 2022-09-13 RX ADMIN — PERFLUTREN 3 ML: 6.52 INJECTION, SUSPENSION INTRAVENOUS at 14:32

## 2022-09-13 RX ADMIN — ACETAMINOPHEN 650 MG: 325 TABLET, FILM COATED ORAL at 21:09

## 2022-09-13 RX ADMIN — DOCUSATE SODIUM 100 MG: 100 CAPSULE, LIQUID FILLED ORAL at 09:14

## 2022-09-13 RX ADMIN — Medication 31.4 MILLICURIE: at 11:06

## 2022-09-13 RX ADMIN — CETIRIZINE HYDROCHLORIDE 10 MG: 10 TABLET ORAL at 09:14

## 2022-09-13 RX ADMIN — POTASSIUM CHLORIDE 20 MEQ: 1500 TABLET, EXTENDED RELEASE ORAL at 10:23

## 2022-09-13 RX ADMIN — FUROSEMIDE 20 MG: 10 INJECTION, SOLUTION INTRAMUSCULAR; INTRAVENOUS at 10:23

## 2022-09-13 RX ADMIN — Medication 8.3 MILLICURIE: at 07:49

## 2022-09-13 RX ADMIN — ACETAMINOPHEN 650 MG: 325 TABLET, FILM COATED ORAL at 05:28

## 2022-09-13 ASSESSMENT — ACTIVITIES OF DAILY LIVING (ADL)
ADLS_ACUITY_SCORE: 18

## 2022-09-13 NOTE — PROGRESS NOTES
Nuclear Medicine Lexiscan stress test:  Sitting protocol.  0.4 mg IV lexiscan administered.  Peak VS:  HR:  105  BP:  146/87.  Symptoms:  Mild SOB, lightheadedness and c/o feeling tired all that resolved in recovery.  Nuclear imaging and interpretation pending.

## 2022-09-13 NOTE — PROGRESS NOTES
CARDIOLOGY PROGRESS NOTE      Assessment/Plan:  1.  Troponin elevation-high-sensitivity at outside hospital was elevated at 0.188 with upper limit of normal 0.027.  Her troponin has been normal here, suspect elevation is due to nonischemic LV wall stress increase due to heart failure..  Awaiting results of pharmacological stress nuclear today.   2.  Shortness of breath-suspect this is heart failure, possibly acute in the setting preserved ejection fraction due to discontinuation of HCTZ in the setting of hypotension.    BNP is elevated, symptomatically improved on IV furosemide.    3.  Benign essential hypertension- under good control currently.  4.  Pure hypercholesterolemia- patient has elected not to use statin in the past.  Atorvastatin was started by primary yesterday.  We will need to discuss continue this as an outpatient.  5.  Borderline hypokalemia-secondary to furosemide will give dose of potassium.    Plan:  1.  Echocardiogram today.  2.  Await results of stress test today.  3.  Continue IV diuretic.  4.  Potential discharge home tomorrow.  5.  Oral potassium.    Discharge Plannin.  Barrier to discharge as outcome of echo, stress test and last dose of IV diuretic.  Assume tomorrow.  2.  Can follow with me as primary cardiologist.     LOS: 1 day     Subjective:  Interval History:    61-year-old white female being seen on second day of hospitalization.  Still little bit fatigue, lethargy.  No chest pains, palpitations, shortness of breath, PND, orthopnea or peripheral edema.    Medications    atorvastatin  10 mg Oral QPM     cetirizine  10 mg Oral Daily     docusate sodium  100 mg Oral BID     sodium chloride (PF)  3 mL Intracatheter Q8H     technetium sestamibi 2 UD per study  25-45 millicurie Intravenous Once     Objective:   Vital signs in last 24 hours:  Temp:  [98  F (36.7  C)-98.7  F (37.1  C)] 98  F (36.7  C)  Pulse:  [77-95] 77  Resp:  [18-20] 18  BP: (116-153)/(75-89) 125/79  SpO2:  [95  "%-98 %] 95 %    Physical Exam:  /79 (BP Location: Right arm)   Pulse 77   Temp 98  F (36.7  C) (Oral)   Resp 18   Ht 1.6 m (5' 3\")   Wt 75.4 kg (166 lb 2 oz)   SpO2 95%   BMI 29.43 kg/m      General Appearance:    Alert, cooperative, no distress, appears stated age   Head:    Normocephalic, without obvious abnormality, atraumatic   Throat:   Lips, mucosa, and tongue normal; teeth and gums normal   Neck:   Supple, symmetrical, trachea midline, no adenopathy;        thyroid:  No enlargement/tenderness/nodules; no carotid    bruit or JVD   Back:     Symmetric, no curvature, ROM normal, no CVA tenderness   Lungs:     Clear to auscultation bilaterally, respirations unlabored   Chest wall:    No tenderness or deformity   Heart:    Regular rate and rhythm, S1 and S2 normal, no murmur, rub   or gallop   Abdomen:     Soft, non-tender, bowel sounds active all four quadrants,     no masses, no organomegaly   Extremities:   Normal, atraumatic, no cyanosis or edema   Pulses:   2+ and symmetric all extremities   Skin:   Skin color, texture, turgor normal, no rashes or lesions     Cardiographics:      ECG: Personally reviewed by myself shows sinus rhythm, indeterminate axis, incomplete right bundle branch block pattern.    Echocardiogram: Pending      Lab Results:   Recent Labs   Lab 09/13/22  0443   WBC 7.0   HGB 12.0   HCT 36.4        Recent Labs   Lab 09/13/22  0443      CO2 24   BUN 19   .       Lab Results   Component Value Date    TROPONINI 0.16 09/13/2022           "

## 2022-09-13 NOTE — CONSULTS
Care Management Initial Consult    General Information  Assessment completed with: Patient, Spouse or significant other, pt  Type of CM/SW Visit: Initial Assessment    Primary Care Provider verified and updated as needed: Yes   Readmission within the last 30 days: no previous admission in last 30 days   Return Category: Progression of disease  Reason for Consult: discharge planning  Advance Care Planning: Advance Care Planning Reviewed: no concerns identified          Communication Assessment  Patient's communication style: spoken language (English or Bilingual)    Hearing Difficulty or Deaf: no   Wear Glasses or Blind: no    Cognitive  Cognitive/Neuro/Behavioral: WDL                      Living Environment:   People in home: spouse     Current living Arrangements: house      Able to return to prior arrangements: yes       Family/Social Support:  Care provided by: self  Provides care for: no one  Marital Status:             Description of Support System: Supportive, Involved    Support Assessment: Adequate family and caregiver support, Adequate social supports    Current Resources:   Patient receiving home care services: No     Community Resources: None  Equipment currently used at home: none  Supplies currently used at home: None    Employment/Financial:  Employment Status:          Financial Concerns: No concerns identified   Referral to Financial Worker: No       Lifestyle & Psychosocial Needs:  Social Determinants of Health     Tobacco Use: Medium Risk     Smoking Tobacco Use: Former Smoker     Smokeless Tobacco Use: Never Used   Alcohol Use: Not on file   Financial Resource Strain: Not on file   Food Insecurity: Not on file   Transportation Needs: Not on file   Physical Activity: Not on file   Stress: Not on file   Social Connections: Not on file   Intimate Partner Violence: Not on file   Depression: Not at risk     PHQ-2 Score: 0   Housing Stability: Not on file       Functional Status:  Prior to  admission patient needed assistance:   Dependent ADLs:: Independent  Dependent IADLs:: Independent  Assesssment of Functional Status: Not at baseline with ADL Functioning, Not at baseline with mobility, Not at  functional baseline    Mental Health Status:  Mental Health Status: No Current Concerns       Chemical Dependency Status:                Values/Beliefs:  Spiritual, Cultural Beliefs, [employer] Practices, Values that affect care:                 Additional Information:  Assessed, lives w/spouse Dami, independent at baseline and no svcs,       Diann Moctezuma RN

## 2022-09-13 NOTE — PROGRESS NOTES
[facility]    Medicine Progress Note - Hospitalist Service    Date of Admission:  9/12/2022    Assessment & Plan               Carole Reich is a 61 year old female history of hypertension as well as phentermine use admitted to the hospital with increasing dyspnea on exertion and abnormal troponin    Abnormal troponin  --- Resolved.  Present at outside facility and not present here.  Serial troponin here normal  --- Concern for symptoms of increasing exertional chest pain with dyspnea on exertion and elevated troponin emergency room; differential also includes strain secondary to CHF  --- Elevated BNP, CTA findings of effusions and pulmonary edema  --- Cardiology consulted and following   --- Abnormal stress test with possible small nontransmural myocardial infarction, normal EF of 70% with low risk of future cardiac ischemic events   --- Continue Lasix  --- Stop heparin drip started at urgency room  --- Continue telemetry  --- Echo ordered  --- Stop phentermine  --- , continue statin    Hypertension  --- Patient stopped HCTZ due to concerns for hypotension.  --- Continue Lasix  --- Hold HCTZ for now  --- Check BMP in a.m.    Thyroid nodule  --- 1 cm, seen on emergency room CTA  ---needs outpatient     Allergies--home Zyrtec           Diet: 2 Gram Sodium Diet    DVT Prophylaxis: Low Risk/Ambulatory with no VTE prophylaxis indicated  Farr Catheter: Not present  Central Lines: None  Cardiac Monitoring: ACTIVE order. Indication: Acute decompensated heart failure (48 hours)  Code Status: Full Code      Disposition Plan      Expected Discharge Date: 09/14/2022      Destination: home          The patient's care was discussed with the Cardiology Consultant.    Joy Chaves MD  Hospitalist Service  [facility]  Securely message with the Vocera Web Console (learn more here)  Text page via [employer] Paging/Directory         Clinically Significant Risk Factors Present on  "Admission                # Overweight: Estimated body mass index is 29.43 kg/m  as calculated from the following:    Height as of this encounter: 1.6 m (5' 3\").    Weight as of this encounter: 75.4 kg (166 lb 2 oz).        ______________________________________________________________________    Interval History   --- Patient seen and chart reviewed.  --- She feels less congestion in her chest.  Cough is almost gone.  She wonders if a mild morning cough might be related to what she was feeling  --- No further chest  --- Denies PND orthopnea    Data reviewed today: I reviewed all medications, new labs and imaging results over the last 24 hours. .    Physical Exam   Vital Signs: Temp: 98.3  F (36.8  C) Temp src: Oral BP: 108/62 Pulse: 90   Resp: 20 SpO2: 97 % O2 Device: None (Room air)    Weight: 166 lbs 2 oz  General Appearance: Pleasant, no apparent distress  Respiratory: Mild crackles bilaterally without wheezing  Cardiovascular: Regular rate and rhythm with 2 out of 6 systolic murmur  GI: Soft and nontender without hepatosplenomegaly  Skin: Trace lower extremity edema  Other: Neurologically grossly intact without focal deficits appreciate  Psych; mildly anxious but overall mood euthymic and speech regular rate and rhythm    Data   Recent Labs   Lab 09/13/22  0443 09/12/22  1227   WBC 7.0 11.4*   HGB 12.0 12.4   MCV 93 94    264     --    POTASSIUM 3.6  --    CHLORIDE 110*  --    CO2 24  --    BUN 19  --    CR 0.69  --    ANIONGAP 9  --    KARLA 8.6  --      --      "

## 2022-09-14 ENCOUNTER — TELEPHONE (OUTPATIENT)
Dept: CARDIOLOGY | Facility: CLINIC | Age: 62
End: 2022-09-14

## 2022-09-14 VITALS
TEMPERATURE: 98.3 F | RESPIRATION RATE: 20 BRPM | SYSTOLIC BLOOD PRESSURE: 134 MMHG | DIASTOLIC BLOOD PRESSURE: 82 MMHG | BODY MASS INDEX: 29.23 KG/M2 | WEIGHT: 165 LBS | HEIGHT: 63 IN | OXYGEN SATURATION: 98 % | HEART RATE: 84 BPM

## 2022-09-14 DIAGNOSIS — D23.9 DYSPLASTIC NEVI: ICD-10-CM

## 2022-09-14 DIAGNOSIS — I21.4 NSTEMI (NON-ST ELEVATED MYOCARDIAL INFARCTION) (H): ICD-10-CM

## 2022-09-14 DIAGNOSIS — R94.39 ABNORMAL CARDIOVASCULAR STRESS TEST: Primary | ICD-10-CM

## 2022-09-14 LAB
ANION GAP SERPL CALCULATED.3IONS-SCNC: 6 MMOL/L (ref 5–18)
BUN SERPL-MCNC: 22 MG/DL (ref 8–22)
CALCIUM SERPL-MCNC: 9 MG/DL (ref 8.5–10.5)
CHLORIDE BLD-SCNC: 109 MMOL/L (ref 98–107)
CO2 SERPL-SCNC: 27 MMOL/L (ref 22–31)
CREAT SERPL-MCNC: 0.76 MG/DL (ref 0.6–1.1)
GFR SERPL CREATININE-BSD FRML MDRD: 88 ML/MIN/1.73M2
GLUCOSE BLD-MCNC: 102 MG/DL (ref 70–125)
POTASSIUM BLD-SCNC: 4.3 MMOL/L (ref 3.5–5)
SODIUM SERPL-SCNC: 142 MMOL/L (ref 136–145)
UFH PPP CHRO-ACNC: <0.1 IU/ML

## 2022-09-14 PROCEDURE — 99232 SBSQ HOSP IP/OBS MODERATE 35: CPT | Performed by: INTERNAL MEDICINE

## 2022-09-14 PROCEDURE — 250N000013 HC RX MED GY IP 250 OP 250 PS 637: Performed by: INTERNAL MEDICINE

## 2022-09-14 PROCEDURE — 36415 COLL VENOUS BLD VENIPUNCTURE: CPT | Performed by: FAMILY MEDICINE

## 2022-09-14 PROCEDURE — 99239 HOSP IP/OBS DSCHRG MGMT >30: CPT | Performed by: FAMILY MEDICINE

## 2022-09-14 PROCEDURE — 85520 HEPARIN ASSAY: CPT | Performed by: FAMILY MEDICINE

## 2022-09-14 PROCEDURE — 80048 BASIC METABOLIC PNL TOTAL CA: CPT | Performed by: FAMILY MEDICINE

## 2022-09-14 PROCEDURE — 250N000013 HC RX MED GY IP 250 OP 250 PS 637: Performed by: FAMILY MEDICINE

## 2022-09-14 RX ORDER — HYDROCHLOROTHIAZIDE 25 MG/1
25 TABLET ORAL DAILY
Status: DISCONTINUED | OUTPATIENT
Start: 2022-09-14 | End: 2022-09-14 | Stop reason: HOSPADM

## 2022-09-14 RX ORDER — HYDROCHLOROTHIAZIDE 25 MG/1
25 TABLET ORAL DAILY
Qty: 90 TABLET | Refills: 0 | Status: SHIPPED | OUTPATIENT
Start: 2022-09-14 | End: 2023-01-05

## 2022-09-14 RX ORDER — ASPIRIN 81 MG/1
81 TABLET ORAL DAILY
Status: DISCONTINUED | OUTPATIENT
Start: 2022-09-14 | End: 2022-09-14 | Stop reason: HOSPADM

## 2022-09-14 RX ORDER — ATORVASTATIN CALCIUM 10 MG/1
10 TABLET, FILM COATED ORAL EVERY EVENING
Qty: 30 TABLET | Refills: 0 | Status: SHIPPED | OUTPATIENT
Start: 2022-09-14 | End: 2022-09-14

## 2022-09-14 RX ORDER — ATORVASTATIN CALCIUM 10 MG/1
10 TABLET, FILM COATED ORAL EVERY EVENING
Qty: 30 TABLET | Refills: 0 | Status: SHIPPED | OUTPATIENT
Start: 2022-09-14 | End: 2022-10-03

## 2022-09-14 RX ADMIN — HYDROCHLOROTHIAZIDE 25 MG: 25 TABLET ORAL at 10:19

## 2022-09-14 RX ADMIN — CETIRIZINE HYDROCHLORIDE 10 MG: 10 TABLET ORAL at 08:00

## 2022-09-14 RX ADMIN — ASPIRIN 81 MG: 81 TABLET ORAL at 10:19

## 2022-09-14 RX ADMIN — DOCUSATE SODIUM 100 MG: 100 CAPSULE, LIQUID FILLED ORAL at 08:00

## 2022-09-14 RX ADMIN — ACETAMINOPHEN 650 MG: 325 TABLET, FILM COATED ORAL at 08:00

## 2022-09-14 ASSESSMENT — ACTIVITIES OF DAILY LIVING (ADL)
ADLS_ACUITY_SCORE: 18

## 2022-09-14 NOTE — DISCHARGE SUMMARY
[facility]  Hospitalist Discharge Summary      Date of Admission:  9/12/2022  Date of Discharge:  9/14/2022 11:56 AM  Discharging Provider: Joy Chaves MD  Discharge Service: Hospitalist Service    Discharge Diagnoses       Acute HFpEF, improved    Troponin elevation, borderline.  Only elevated at outside facility and normal here.  Suspect due to nonischemic LV wall stress increase due to heart failure.  Planning outpatient coronary CTA    Thyroid nodule; incidental on outpatient CT.  Recommend outpatient follow-up    Hypertension    Hyperlipidemia    Coronary artery disease; nuclear stress test suggest small area of mild distal nontransmural anterior apical scar.  Planning coronary CTA as    Follow-ups Needed After Discharge   Follow-up Appointments     Follow-up and recommended labs and tests       Follow up with primary care provider, Minerva Lockhart, within 7 days   for hospital follow- up.  The following labs/tests are recommended: BMP.    Follow up with Dr. Kincaid in 4-6 weeks after CTA done  Follow up for outpatient  coronary CTA at [facility]             Discharge Disposition   Discharged to home  Condition at discharge: Stable      Hospital Course                 Carole Reich is a 61 year old female history of hypertension as well as phentermine use admitted to the hospital with increasing dyspnea on exertion and abnormal troponin.  She had been seen at outside facility and with concerns for NSTEMI was transferred here.  Initially treated with heparin drip but serial troponin markers here were not elevated.  Outside CTA found pulmonary edema.  BNP also elevated.  Underwent stress test with that was mildly abnormal with low risk future cardiac ischemic events.  Echo with normal EF but did show borderline distal anterior wall hypokinesis.  Patient admitted to stopping hydrochlorothiazide due to concerns for dizziness 6 days prior to presentation.  Was felt to clinically have  CHF likely related to cardiac strain but will need full ischemic evaluation with coronary CTA as an outpatient felt much better after receiving Lasix IV.  Was placed back on hydrochlorothiazide and discharged in good condition for further outpatient cardiac work-up.  Patient started on statin for LDL of 127.  Started on aspirin due to findings of coronary artery disease.  Encouraged to stop phentermine and any NSAID use and use Tylenol instead.  Discharged in good condition    Hypertension  --- Patient stopped HCTZ due to concerns for hypotension.  --- Was on Lasix while here but resume HCTZ for discharge    Thyroid nodule  --- 1 cm, seen on emergency room CTA  ---needs outpatient follow-up            Consultations This Hospital Stay   PHARMACY IP CONSULT  PHARMACY IP CONSULT  CARDIOLOGY IP CONSULT  CARE MANAGEMENT / SOCIAL WORK IP CONSULT    Code Status   Prior    Time Spent on this Encounter   I, Joy Chaves MD, personally saw the patient today and spent greater than 30 minutes discharging this patient.       Joy Chaves MD  [facility]  [address] [place] [id number]  Phone: [phone]  Fax: [phone]  ______________________________________________________________________    Physical Exam   Vital Signs: Temp: 98.3  F (36.8  C) Temp src: Oral BP: 134/82 Pulse: 84   Resp: 20 SpO2: 98 % O2 Device: None (Room air)    Weight: 165 lbs 0 oz  General Appearance: Pleasant female, no apparent distress  Respiratory: Fairly clear, minimal crackles bilateral  Cardiovascular: Regular rate and rhythm without murmur  GI: Soft and nontender without hepatosplenomegaly  Skin: Trace lower extremity edema  Other: Neurologically grossly intact without focal deficits       Primary Care Physician   Minerva Lockhart    Discharge Orders      Primary Care - Care Coordination Referral      Reason for your hospital stay    Congestive heart failure     Follow-up and recommended labs and  tests     Follow up with primary care provider, Minerva Lockhart, within 7 days for hospital follow- up.  The following labs/tests are recommended: BMP.    Follow up with Dr. Kincaid in 4-6 weeks after CTA done  Follow up for outpatient  [facility]on[facility]    Your activity upon discharge: activity as tolerated     Diet    Follow this diet upon discharge: Orders Placed This Encounter      NPO for Medical/Clinical Reasons Except for: Meds         Significant Results and Procedures   Most Recent 3 CBC's:Recent Labs   Lab Test 22  0443 22  1227 21  1102   WBC 7.0 11.4* 8.1   HGB 12.0 12.4 14.5   MCV 93 94 93    264 322     Most Recent 3 BMP's:Recent Labs   Lab Test 22  0506 22  0443 21  1102    143 143   POTASSIUM 4.3 3.6 3.9   CHLORIDE 109* 110* 103   CO2 27 24 29   BUN 22 19 20   CR 0.76 0.69 0.78   ANIONGAP 6 9 11   KARLA 9.0 8.6 9.6    102 93   ,   Results for orders placed or performed during the hospital encounter of 22   Echocardiogram Complete     Value    LVEF  60-65%    Narrative    [id number]  [id number]  [id number]  [id number]^LILI^LONNIE^[place] [address]     Name: DESMOND JOYCE  MRN: [id number]  : 1960  Study Date: 2022 02:07 PM  Age: 61 yrs  Gender: Female  Patient Location: [facility]  Reason For Study: CHF  Ordering Physician: LONNIE PEARSON  Performed By: BP     BSA: 1.8 m2  Height: 63 in  Weight: 166 lb  HR: 77  ______________________________________________________________________________  Procedure  Complete Portable Echo Adult. Definity (NDC #86674-271) given intravenously.  No hemodynamically significant valvular abnormalities on 2D or color flow  imaging. There is no comparison study available.  ______________________________________________________________________________  Interpretation Summary     1.Left ventricular size, wall motion and function are normal. The  ejection  fraction is 60-65%.  2.There is borderline distal anterior wall hypokinesis.  3.Normal right ventricle size and systolic function.  4.No hemodynamically significant valvular abnormalities on 2D or color flow  imaging.  There is no comparison study available.  ______________________________________________________________________________  I      WMSI = 1.13     % Normal = 88     X - Cannot   0 -                      (2) - Mildly 2 -          Segments  Size  Interpret    Hyperkinetic 1 - Normal  Hypokinetic  Hypokinetic  1-2     small                                                     7 -          3-5      moderate  3 - Akinetic 4 -          5 -         6 - Akinetic Dyskinetic   6-14    large               Dyskinetic   Aneurysmal  w/scar       w/scar       15-16   diffuse     Left Ventricle  Left ventricular size, wall motion and function are normal. The ejection  fraction is 60-65%. There is normal left ventricular wall thickness. Left  ventricular diastolic function is normal. There is borderline anterior wall  hypokinesis.     Right Ventricle  Normal right ventricle size and systolic function. TAPSE is normal, which is  consistent with normal right ventricular systolic function.     Atria  Normal left atrial size. Right atrial size is normal. There is no color  Doppler evidence of an atrial shunt.     Mitral Valve  Mitral valve leaflets appear normal. There is no evidence of mitral stenosis  or clinically significant mitral regurgitation. There is no mitral  regurgitation noted. There is no mitral valve stenosis.     Tricuspid Valve  The tricuspid valve is not well visualized, but is grossly normal. Right  ventricle systolic pressure estimate normal. There is physiologic tricuspid  regurgitation. There is no tricuspid stenosis.     Aortic Valve  Aortic valve leaflets appear normal. There is no evidence of aortic stenosis  or clinically significant aortic regurgitation. The aortic valve is  trileaflet. No  aortic regurgitation is present. No aortic stenosis is present.     Pulmonic Valve  The pulmonic valve is not well seen, but is grossly normal. This degree of  valvular regurgitation is within normal limits. There is no pulmonic valvular  stenosis.     Vessels  The aorta root is normal. Normal size ascending aorta. IVC diameter <2.1 cm  collapsing >50% with sniff suggests a normal RA pressure of 3 mmHg.     Pericardium  There is no pericardial effusion.     Rhythm  Sinus rhythm was noted.     ______________________________________________________________________________  MMode/2D Measurements & Calculations  IVSd: 0.97 cm  LVIDd: 4.3 cm  LVIDs: 2.8 cm  LVPWd: 0.92 cm     FS: 35.3 %  LV mass(C)d: 129.7 grams  LV mass(C)dI: 72.6 grams/m2  Ao root diam: 3.3 cm  LA dimension: 3.5 cm  LA/Ao: 1.1  LVOT diam: 1.8 cm  LVOT area: 2.4 cm2     EF(MOD-bp): 60.6 %  LA Volume Indexed (AL/bp): 22.6 ml/m2  RWT: 0.43     Time Measurements  MM HR: 77.0 BPM     Doppler Measurements & Calculations  MV E max chin: 67.6 cm/sec  MV A max chin: 58.5 cm/sec  MV E/A: 1.2  MV max PG: 3.5 mmHg  MV mean P.3 mmHg  MV V2 VTI: 22.4 cm  MVA(VTI): 2.8 cm2  MV P1/2t max chin: 73.4 cm/sec  MV P1/2t: 105.4 msec  MVA(P1/2t): 2.1 cm2  MV dec slope: 204.0 cm/sec2  MV dec time: 0.21 sec  Ao V2 max: 128.5 cm/sec  Ao max P.0 mmHg  Ao V2 mean: 99.6 cm/sec  Ao mean P.5 mmHg  Ao V2 VTI: 25.3 cm  VAN(I,D): 2.5 cm2  VAN(V,D): 2.4 cm2  LV V1 max P.5 mmHg  LV V1 max: 127.3 cm/sec  LV V1 VTI: 26.0 cm  SV(LVOT): 63.1 ml  SI(LVOT): 35.3 ml/m2  PA acc time: 0.10 sec  TR max chin: 206.3 cm/sec  TR max P.0 mmHg  AV Chin Ratio (DI): 0.99  VAN Index (cm2/m2): 1.4  E/E': 16.5  E/E' av.3  Lateral E/e': 17.9  Medial E/e': 16.7     Peak E' Chin: 4.1 cm/sec     ______________________________________________________________________________  Report approved by: Patricia Zacarias 2022 03:36 PM         NM MPI w Lexiscan     Value    Pharmacologic  Protocol Lexiscan    Test Type Pharmacological    Baseline HR 83    Baseline Systolic     Baseline Diastolic BP 83    Last Stress     Last Stress Systolic     Last Stress Diastolic BP 87    Target     PERCENT HR 85%    Exercise duration (min) 4    Exercise duration (sec) 0    Estimated workload 1.0    ST Deviation Elevation V1 0.2mm    Deviation Time II -0.3mm    ST Elevation Amount II 0.6mm    ST Deviation Amount he aVR -0.6mm    Final Resting /69    Final Resting HR 95    Max Treadmill Speed 0.0    Max Treadmill Grade 0.0    Peak Systolic /87    Peak Diastolic /93    Max HR  108    Stress Phase Resting    Stress Resting Pt Position STANDING    Current HR 88    Current /93    Stress Phase Resting    Stress Resting Pt Position MANUAL EVENT    Current HR 83    Current /83    Stress Phase Stress    Stage Minute EXE 00:00    Exercise Stage STAGE 2    Current HR 91    Current /93    Stress Phase Stress    Stage Minute EXE 01:00    Exercise Stage STAGE 3    Current HR 87    Current /93    Stress Phase Stress    Stage Minute EXE 01:33    Exercise Stage STAGE 3    Current     Current /73    Stress Phase Stress    Stage Minute EXE 02:00    Exercise Stage STAGE 4    Current     Current /73    Stress Phase Stress    Stage Minute EXE 02:07    Exercise Stage STAGE 4    Current     Current /82    Stress Phase Stress    Stage Minute EXE 03:00    Exercise Stage STAGE 5    Current     Current /82    Stress Phase Stress    Stage Minute EXE 03:18    Exercise Stage STAGE 5    Current     Current /87    Stress Phase Stress    Stage Minute EXE 04:00    Exercise Stage STAGE 6    Current     Current /87    Stress Phase Stress    Stage Minute EXE 04:00    Exercise Stage STAGE 6    Current     Current /87    Stress Phase Recovery    Stage Minute REC 00:52    Exercise Stage Recovery    Current      Current /93    Stress Phase Recovery    Stage Minute REC 00:59    Exercise Stage Recovery    Current     Current /93    Stress Phase Recovery    Stage Minute REC 01:59    Exercise Stage Recovery    Current HR 98    Current /93    Stress Phase Recovery    Stage Minute REC 02:13    Exercise Stage Recovery    Current HR 97    Current /69    Stress Phase Recovery    Stage Minute REC 02:59    Exercise Stage Recovery    Current HR 95    Current /69    Stress Phase Recovery    Stage Minute REC 03:59    Exercise Stage Recovery    Current HR 96    Current /69    Stress Phase Recovery    Stage Minute REC 04:01    Exercise Stage Recovery    Current HR 95    Current /69    Max Predicted HR  69    Rate Pressure Product 15,768.0    Narrative       There is no prior study for comparison.     Pharmacological regadenoson stress ECG is negative for inducible   myocardial ischemia.     Pharmacological regadenoson nuclear stress test is abnormal.  There is   reduced radiotracer uptake in mid to distal anterior wall on both stress   and rest imaging, more evident on stress imaging indicating a small size   of inducible myocardial ischemia.  There is possible small sized   nontransmural myocardial infarction.     Normal left ventricular size, wall motion and systolic function.  The   calculated left ventricular ejection fraction is more than 70%.     The patient is at a low to intermediate risk of future cardiac ischemic   events.           Discharge Medications   Discharge Medication List as of 9/14/2022 11:01 AM      CONTINUE these medications which have CHANGED    Details   aspirin (ASA) 81 MG EC tablet Take 1 tablet (81 mg) by mouth daily, Disp-30 tablet, R-0, E-Prescribe      atorvastatin (LIPITOR) 10 MG tablet Take 1 tablet (10 mg) by mouth every evening, Disp-30 tablet, R-0, E-Prescribe      hydrochlorothiazide (HYDRODIURIL) 25 MG tablet Take 1 tablet (25 mg) by mouth  daily, Disp-90 tablet, R-0, E-Prescribe         CONTINUE these medications which have NOT CHANGED    Details   acyclovir (ZOVIRAX) 400 MG tablet TAKE 1 TABLET BY MOUTH FIVE TIMES DAILY FOR 4 TO 5 DAYS AS NEEDED FOR COLD SORE, Historical      cetirizine (ZYRTEC) 10 MG tablet Take 10 mg by mouth daily, Historical      Multiple Vitamins-Minerals (ONCOVITE) TABS Take 2 tablets by mouth daily, Historical      vitamin D3 (CHOLECALCIFEROL) 250 mcg (63299 units) capsule Take 2 capsules by mouth daily, Historical      ZOLMitriptan (ZOMIG) 5 MG tablet Take 1 tablet (5 mg) by mouth at onset of headache for migraine May repeat in 2 hours. Max 2 tablets/24 hours., Disp-12 tablet, R-3, E-Prescribe         STOP taking these medications       phentermine (ADIPEX-P) 37.5 MG capsule Comments:   Reason for Stopping:             Allergies   Allergies   Allergen Reactions     Dog Hair [Dogs]      Dust Mites      Mold      Topiramate      Confusion, memory changes

## 2022-09-14 NOTE — TELEPHONE ENCOUNTER
----- Message from Jere Kincaid MD sent at 9/14/2022  9:33 AM CDT -----  Inpatient here at [facility] going home today.  Given abnormal echo and stress nuclear can you please arrange for coronary CTA and then follow-up with me?  LF

## 2022-09-14 NOTE — PROGRESS NOTES
CARDIOLOGY PROGRESS NOTE      Assessment/Plan:  1.  Troponin elevation-high-sensitivity at outside hospital was elevated at 0.188 with upper limit of normal 0.027.  Her troponin has been normal here, suspect elevation is due to nonischemic LV wall stress increase due to heart failure.  Cannot exclude remote MI of distal anterior wall..  No significant ischemia.    2.  Shortness of breath-suspect this is heart failure, possibly acute in the setting preserved ejection fraction of greater than 70% due to discontinuation of HCTZ in the setting of hypotension.    BNP was elevated, symptomatically improved on IV furosemide.    3.  Benign essential hypertension- under good control currently.  Restart home HCTZ.  4.  Pure hypercholesterolemia- patient has elected not to use statin in the past.  Atorvastatin was started by hospitalist.  Given abnormal nuclear stress test and echo agree with atorvastatin, if causing headache we will need to discontinue.   5.  Coronary artery disease-nuclear stress test suggest small area of mild distal nontransmural anterior apical scar with a small amount of jase-infarct apical ischemia.  Echocardiogram does suggest wall motion abnormality.  Will evaluate completely with coronary CTA as outpatient.  5.  Borderline hypokalemia-secondary to furosemide and resolved.  6.  Headache- uncertain etiology, could be due to statin, defer to primary.    Plan:  1.  Home today.    Discharge Plannin.  No significant cardiac barrier to discharge.    2.  Can follow with me as primary cardiologist.  3.  Needs outpatient coronary CT[facility] at [facility].     LOS: 2 day     Subjective:  Interval History:    61-year-old white female being seen on third day of hospitalization.  Still little bit fatigue, lethargy.  Complains of mild cough, does have a frontal headache.  No chest pains, palpitations, shortness of breath, PND, orthopnea or peripheral edema.    Medications    atorvastatin  10 mg Oral QPM  "    cetirizine  10 mg Oral Daily     docusate sodium  100 mg Oral BID     sodium chloride (PF)  3 mL Intracatheter Q8H     Objective:   Vital signs in last 24 hours:  Temp:  [98.3  F (36.8  C)-99.2  F (37.3  C)] 98.3  F (36.8  C)  Pulse:  [71-90] 84  Resp:  [18-20] 20  BP: (108-134)/(62-82) 134/82  SpO2:  [93 %-98 %] 98 %    Physical Exam:  /82 (BP Location: Right arm)   Pulse 84   Temp 98.3  F (36.8  C) (Oral)   Resp 20   Ht 1.6 m (5' 3\")   Wt 74.8 kg (165 lb)   SpO2 98%   BMI 29.23 kg/m      General Appearance:    Alert, cooperative, no distress, appears stated age   Head:    Normocephalic, without obvious abnormality, atraumatic   Throat:   Lips, mucosa, and tongue normal; teeth and gums normal   Neck:   Supple, symmetrical, trachea midline, no adenopathy;        thyroid:  No enlargement/tenderness/nodules; no carotid    bruit or JVD   Back:     Symmetric, no curvature, ROM normal, no CVA tenderness   Lungs:     Clear to auscultation bilaterally, respirations unlabored   Chest wall:    No tenderness or deformity   Heart:    Regular rate and rhythm, S1 and S2 normal, no murmur, rub   or gallop   Abdomen:     Soft, non-tender, bowel sounds active all four quadrants,     no masses, no organomegaly   Extremities:   Normal, atraumatic, no cyanosis or edema   Pulses:   2+ and symmetric all extremities   Skin:   Skin color, texture, turgor normal, no rashes or lesions     Cardiographics:      ECG: Personally reviewed by myself shows sinus rhythm, indeterminate axis, incomplete right bundle branch block pattern.    Echocardiogram: Pending      Lab Results:   Recent Labs   Lab 09/13/22  0443   WBC 7.0   HGB 12.0   HCT 36.4        Recent Labs   Lab 09/14/22  0506      CO2 27   BUN 22   .       Lab Results   Component Value Date    TROPONINI 0.16 09/13/2022           "

## 2022-09-14 NOTE — PROGRESS NOTES
Care Management Discharge Note    Discharge Date: 09/14/2022       Discharge Disposition: Home    Discharge Services:  NA    Discharge DME:  NA    Discharge Transportation: family or friend will provide    Private pay costs discussed: Not applicable    Education Provided on the Discharge Plan:  Yes  Persons Notified of Discharge Plans: MD, RN, Patient  Patient/Family in Agreement with the Plan: yes    Handoff Referral Completed: Yes    Additional Information:  Patient lives w/spouse Dami. Patient is independent at baseline and receives no svcs. Patient will discharge home today with family transport.         Sally Galvan

## 2022-09-15 ENCOUNTER — PATIENT OUTREACH (OUTPATIENT)
Dept: CARE COORDINATION | Facility: CLINIC | Age: 62
End: 2022-09-15

## 2022-09-15 NOTE — PROGRESS NOTES
"Clinic Care Coordination Contact  [facility]: Post-Discharge Note  SITUATION                                                      Admission:    Admission Date: 09/12/22   Reason for Admission: Chest pain  Discharge:   Discharge Date: 09/14/22  Discharge Diagnosis: Acute HFpEF, improved, Troponin elevation, borderline.  Only elevated at outside facility and normal here.  Suspect due to nonischemic LV wall stress increase due to heart failure.  Planning outpatient coronary CTA,  Thyroid nodule; incidental on outpatient CT.  Recommend outpatient follow-up , Hypertension, Hyperlipidemia, Coronary artery disease; nuclear stress test suggest small area of mild distal nontransmural anterior apical scar.  Planning coronary CTA as    BACKGROUND                                                      Per hospital discharge summary and inpatient provider notes:    Carole Reich is a 61 year old female with a pertinent history of HLD who presents to this ED for evaluation of chest pain      Per chart review: Patient was seen at [facility] in [place] with chest pain. A CT scan was performed and was negative for a PE. Her blood results showed a hemoglobin of 13.3, platelets of 236, and a troponin of 0.188. She was diagnosed with a NSTEMI and treated with 0.4mg of nitroglycerin, 600mg of ibuprofen, 4,000mg of heparin, and a full aspirin. She was transported to the ED via EMS and was given an additional dose of nitroglycerin en route.      Patient reports that 3 days ago she was \"coughing uncontrollably\" and felt an onset of chest pain and tightness that \"felt like someone was sitting on my chest\". She describes the pain as a pressure in her chest that comes and goes, but feels like a 8-9/10 at its worst. Prior to this event, on 9/6 the patient was trimming hedges at her cabin and \"got winded\" and \"had to rest for 30 minutes\" and was \"sweating like crazy\".  She notes that her chest pain started a few days " "later. Since then, she has been feeling \"run down and tired\" while working outside. Patient reports that her shortness of breath got worse yesterday (9/11). Patient notes that she currently is not experiencing the chest pain and that the nitroglycerin helped. Patient denies smoking, abdominal pain, leg swelling, or any other symptoms or complaints.     ASSESSMENT      Discharge Assessment  How are you doing now that you are home?: \"Yup I feel really good, no issues. I feel pretty normal.\"  How are your symptoms? (Red Flag symptoms escalate to triage hotline per guidelines): Improved  Do you feel your condition is stable enough to be safe at home until your provider visit?: Yes  Does the patient have their discharge instructions? : Yes  Does the patient have questions regarding their discharge instructions? : No  Were you started on any new medications or were there changes to any of your previous medications? : Yes  Does the patient have all of their medications?: Yes  Do you have questions regarding any of your medications? : No  Do you have all of your needed medical supplies or equipment (DME)?  (i.e. oxygen tank, CPAP, cane, etc.): Yes  Discharge follow-up appointment scheduled within 14 calendar days? : Yes  Discharge Follow Up Appointment Date: 10/03/22  Discharge Follow Up Appointment Scheduled with?: Primary Care Provider    Post-op ([facility] CTA Only)  If the patient had a surgery or procedure, do they have any questions for a nurse?: No      PLAN                                                      Outpatient Plan:      Follow-up and recommended labs and tests  Follow up with primary care provider, Minerva Lockhart, within 7 days for hospital follow- up. The following labs/tests  are recommended: BMP.  Follow up with Dr. Kincaid in 4-6 weeks after CTA done  Follow up for outpatient coronary CTA at [facility] Appointments   Date Time Provider Department Center   10/3/2022 10:00 AM Minerva Lockhart, " MD [facility]         For any urgent concerns, please contact our 24 hour nurse triage line: [phone] ([phone])         NARDA Keys  [phone]  [facility]

## 2022-09-16 NOTE — TELEPHONE ENCOUNTER
Left a detailed message outlining request for testing and follow up with [facility] there after. Routed to [facility] . -[facility] [place]

## 2022-09-17 ENCOUNTER — HEALTH MAINTENANCE LETTER (OUTPATIENT)
Age: 62
End: 2022-09-17

## 2022-09-19 LAB
ATRIAL RATE - MUSE: 94 BPM
DIASTOLIC BLOOD PRESSURE - MUSE: 89 MMHG
INTERPRETATION ECG - MUSE: NORMAL
P AXIS - MUSE: 41 DEGREES
PR INTERVAL - MUSE: 168 MS
QRS DURATION - MUSE: 80 MS
QT - MUSE: 368 MS
QTC - MUSE: 460 MS
R AXIS - MUSE: 208 DEGREES
SYSTOLIC BLOOD PRESSURE - MUSE: 153 MMHG
T AXIS - MUSE: 52 DEGREES
VENTRICULAR RATE- MUSE: 94 BPM

## 2022-09-30 ASSESSMENT — ENCOUNTER SYMPTOMS
EYE PAIN: 0
DYSURIA: 0
NERVOUS/ANXIOUS: 0
HEARTBURN: 0
WEAKNESS: 0
HEMATOCHEZIA: 0
PARESTHESIAS: 0
SORE THROAT: 0
BREAST MASS: 0
COUGH: 1
FEVER: 0
ARTHRALGIAS: 1
JOINT SWELLING: 0
ABDOMINAL PAIN: 0
HEADACHES: 0
HEMATURIA: 0
SHORTNESS OF BREATH: 0
MYALGIAS: 0
NAUSEA: 0
DIZZINESS: 0
DIARRHEA: 0
CHILLS: 0
FREQUENCY: 0

## 2022-10-03 ENCOUNTER — OFFICE VISIT (OUTPATIENT)
Dept: FAMILY MEDICINE | Facility: CLINIC | Age: 62
End: 2022-10-03
Payer: COMMERCIAL

## 2022-10-03 VITALS
RESPIRATION RATE: 16 BRPM | SYSTOLIC BLOOD PRESSURE: 133 MMHG | DIASTOLIC BLOOD PRESSURE: 89 MMHG | WEIGHT: 167 LBS | BODY MASS INDEX: 29.59 KG/M2 | HEART RATE: 78 BPM | TEMPERATURE: 98.4 F | HEIGHT: 63 IN

## 2022-10-03 DIAGNOSIS — M25.562 CHRONIC PAIN OF BOTH KNEES: ICD-10-CM

## 2022-10-03 DIAGNOSIS — B00.1 RECURRENT COLD SORES: ICD-10-CM

## 2022-10-03 DIAGNOSIS — Z00.00 ENCOUNTER FOR PREVENTATIVE ADULT HEALTH CARE EXAMINATION: Primary | ICD-10-CM

## 2022-10-03 DIAGNOSIS — I10 BENIGN ESSENTIAL HYPERTENSION: ICD-10-CM

## 2022-10-03 DIAGNOSIS — G43.009 MIGRAINE WITHOUT AURA AND WITHOUT STATUS MIGRAINOSUS, NOT INTRACTABLE: ICD-10-CM

## 2022-10-03 DIAGNOSIS — Z12.11 SCREEN FOR COLON CANCER: ICD-10-CM

## 2022-10-03 DIAGNOSIS — Z83.49 FAMILY HISTORY OF HYPOTHYROIDISM: ICD-10-CM

## 2022-10-03 DIAGNOSIS — I50.31 ACUTE HEART FAILURE WITH PRESERVED EJECTION FRACTION (HFPEF) (H): ICD-10-CM

## 2022-10-03 DIAGNOSIS — M25.561 CHRONIC PAIN OF BOTH KNEES: ICD-10-CM

## 2022-10-03 DIAGNOSIS — Z12.31 VISIT FOR SCREENING MAMMOGRAM: ICD-10-CM

## 2022-10-03 DIAGNOSIS — N64.4 BREAST PAIN, RIGHT: ICD-10-CM

## 2022-10-03 DIAGNOSIS — G89.29 CHRONIC PAIN OF BOTH KNEES: ICD-10-CM

## 2022-10-03 DIAGNOSIS — I21.4 NSTEMI (NON-ST ELEVATED MYOCARDIAL INFARCTION) (H): ICD-10-CM

## 2022-10-03 DIAGNOSIS — E04.1 THYROID NODULE: ICD-10-CM

## 2022-10-03 DIAGNOSIS — E55.9 VITAMIN D DEFICIENCY: ICD-10-CM

## 2022-10-03 PROBLEM — Z79.899 CONTROLLED SUBSTANCE AGREEMENT SIGNED: Status: RESOLVED | Noted: 2019-09-25 | Resolved: 2022-10-03

## 2022-10-03 LAB
ALBUMIN SERPL BCG-MCNC: 4.3 G/DL (ref 3.5–5.2)
ALP SERPL-CCNC: 54 U/L (ref 35–104)
ALT SERPL W P-5'-P-CCNC: 11 U/L (ref 10–35)
ANION GAP SERPL CALCULATED.3IONS-SCNC: 10 MMOL/L (ref 7–15)
AST SERPL W P-5'-P-CCNC: 32 U/L (ref 10–35)
BILIRUB SERPL-MCNC: 0.9 MG/DL
BUN SERPL-MCNC: 24.5 MG/DL (ref 8–23)
CALCIUM SERPL-MCNC: 9.7 MG/DL (ref 8.8–10.2)
CHLORIDE SERPL-SCNC: 104 MMOL/L (ref 98–107)
CHOLEST SERPL-MCNC: 188 MG/DL
CREAT SERPL-MCNC: 0.71 MG/DL (ref 0.51–0.95)
DEPRECATED HCO3 PLAS-SCNC: 28 MMOL/L (ref 22–29)
ERYTHROCYTE [DISTWIDTH] IN BLOOD BY AUTOMATED COUNT: 13.3 % (ref 10–15)
GFR SERPL CREATININE-BSD FRML MDRD: >90 ML/MIN/1.73M2
GLUCOSE SERPL-MCNC: 95 MG/DL (ref 70–99)
HCT VFR BLD AUTO: 39.1 % (ref 35–47)
HDLC SERPL-MCNC: 69 MG/DL
HGB BLD-MCNC: 13.1 G/DL (ref 11.7–15.7)
LDLC SERPL CALC-MCNC: 101 MG/DL
MCH RBC QN AUTO: 31 PG (ref 26.5–33)
MCHC RBC AUTO-ENTMCNC: 33.5 G/DL (ref 31.5–36.5)
MCV RBC AUTO: 92 FL (ref 78–100)
NONHDLC SERPL-MCNC: 119 MG/DL
NT-PROBNP SERPL-MCNC: 325 PG/ML (ref 0–125)
PLATELET # BLD AUTO: 295 10E3/UL (ref 150–450)
POTASSIUM SERPL-SCNC: 3.8 MMOL/L (ref 3.4–5.3)
PROT SERPL-MCNC: 6.9 G/DL (ref 6.4–8.3)
RBC # BLD AUTO: 4.23 10E6/UL (ref 3.8–5.2)
SODIUM SERPL-SCNC: 142 MMOL/L (ref 136–145)
TRIGL SERPL-MCNC: 90 MG/DL
TSH SERPL DL<=0.005 MIU/L-ACNC: 2.37 UIU/ML (ref 0.3–4.2)
WBC # BLD AUTO: 6.7 10E3/UL (ref 4–11)

## 2022-10-03 PROCEDURE — 83880 ASSAY OF NATRIURETIC PEPTIDE: CPT | Performed by: FAMILY MEDICINE

## 2022-10-03 PROCEDURE — 36415 COLL VENOUS BLD VENIPUNCTURE: CPT | Performed by: FAMILY MEDICINE

## 2022-10-03 PROCEDURE — 85027 COMPLETE CBC AUTOMATED: CPT | Performed by: FAMILY MEDICINE

## 2022-10-03 PROCEDURE — 82306 VITAMIN D 25 HYDROXY: CPT | Performed by: FAMILY MEDICINE

## 2022-10-03 PROCEDURE — 99396 PREV VISIT EST AGE 40-64: CPT | Performed by: FAMILY MEDICINE

## 2022-10-03 PROCEDURE — 99214 OFFICE O/P EST MOD 30 MIN: CPT | Mod: 25 | Performed by: FAMILY MEDICINE

## 2022-10-03 PROCEDURE — 80061 LIPID PANEL: CPT | Performed by: FAMILY MEDICINE

## 2022-10-03 PROCEDURE — 84443 ASSAY THYROID STIM HORMONE: CPT | Performed by: FAMILY MEDICINE

## 2022-10-03 PROCEDURE — 80053 COMPREHEN METABOLIC PANEL: CPT | Performed by: FAMILY MEDICINE

## 2022-10-03 RX ORDER — VALACYCLOVIR HYDROCHLORIDE 500 MG/1
500 TABLET, FILM COATED ORAL 2 TIMES DAILY
Qty: 30 TABLET | Refills: 3 | Status: SHIPPED | OUTPATIENT
Start: 2022-10-03 | End: 2022-11-14

## 2022-10-03 RX ORDER — ZOLMITRIPTAN 5 MG/1
5 TABLET, FILM COATED ORAL
Qty: 12 TABLET | Refills: 3 | Status: SHIPPED | OUTPATIENT
Start: 2022-10-03

## 2022-10-03 RX ORDER — ACYCLOVIR 400 MG/1
TABLET ORAL
Status: CANCELLED | OUTPATIENT
Start: 2022-10-03

## 2022-10-03 RX ORDER — ROSUVASTATIN CALCIUM 5 MG/1
5 TABLET, COATED ORAL AT BEDTIME
Qty: 90 TABLET | Refills: 3 | Status: SHIPPED | OUTPATIENT
Start: 2022-10-03 | End: 2023-01-05

## 2022-10-03 RX ORDER — ATORVASTATIN CALCIUM 10 MG/1
10 TABLET, FILM COATED ORAL EVERY EVENING
Qty: 90 TABLET | Refills: 3 | Status: CANCELLED | OUTPATIENT
Start: 2022-10-03

## 2022-10-03 ASSESSMENT — ENCOUNTER SYMPTOMS
ABDOMINAL PAIN: 0
ARTHRALGIAS: 1
NERVOUS/ANXIOUS: 0
WEAKNESS: 0
HEADACHES: 0
COUGH: 1
HEMATURIA: 0
EYE PAIN: 0
SHORTNESS OF BREATH: 0
DYSURIA: 0
DIARRHEA: 0
MYALGIAS: 0
FEVER: 0
SORE THROAT: 0
HEMATOCHEZIA: 0
PARESTHESIAS: 0
HEARTBURN: 0
DIZZINESS: 0
BREAST MASS: 0
NAUSEA: 0
JOINT SWELLING: 0
FREQUENCY: 0
CHILLS: 0
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
## 2022-10-03 NOTE — PATIENT INSTRUCTIONS
Will discontinue 10mg Lipitor and switch to 5mg Crestor. If symptoms persist please let me know.     Ordered diagnostic mammogram with the right breat pain. Can call [phone].    Check your blood pressure once a month, if its over 140 on top or 90 on the bottom please let me know.     Last bone density screen looked good. We recommend a follow up scan between 0706-4788.    Thyroid ultrasound ordered since nodule on CT.  Can call [phone] to set at [place].    Following with cardiology.      Health Maintenance   Topic Date Due    COVID-19 Vaccine (1) Declined    ZOSTER IMMUNIZATION (1 of 2) Declined    Pneumococcal Vaccine: Pediatrics (0 to 5 Years) and At-Risk Patients (6 to 64 Years) (2 - PCV) Due age 65    COLORECTAL CANCER SCREENING  12/2026    INFLUENZA VACCINE (1) Declined    ANNUAL REVIEW OF HM ORDERS  Today    PREVENTIVE CARE VISIT  Today    MAMMO SCREENING  11/29/2023    DTAP/TDAP/TD IMMUNIZATION (5 - Td or Tdap) 12/06/2023    HPV TEST  09/02/2025    ADVANCE CARE PLANNING  Has packet at home    PAP  09/02/2025    LIPID  Today    HEPATITIS C SCREENING  Completed    HIV SCREENING  Completed    PHQ-2 (once per calendar year)  Completed    IPV IMMUNIZATION  Aged Out    MENINGITIS IMMUNIZATION  Aged Out    HEPATITIS B IMMUNIZATION  Declined

## 2022-10-04 LAB — DEPRECATED CALCIDIOL+CALCIFEROL SERPL-MC: 73 UG/L (ref 20–75)

## 2022-10-10 ENCOUNTER — TRANSFERRED RECORDS (OUTPATIENT)
Dept: HEALTH INFORMATION MANAGEMENT | Facility: CLINIC | Age: 62
End: 2022-10-10

## 2022-10-25 ENCOUNTER — HOSPITAL ENCOUNTER (OUTPATIENT)
Dept: CT IMAGING | Facility: CLINIC | Age: 62
Discharge: HOME | End: 2022-10-25
Attending: INTERNAL MEDICINE | Admitting: INTERNAL MEDICINE
Payer: COMMERCIAL

## 2022-10-25 VITALS — SYSTOLIC BLOOD PRESSURE: 107 MMHG | DIASTOLIC BLOOD PRESSURE: 63 MMHG

## 2022-10-25 DIAGNOSIS — R94.39 ABNORMAL CARDIOVASCULAR STRESS TEST: ICD-10-CM

## 2022-10-25 DIAGNOSIS — I21.4 NSTEMI (NON-ST ELEVATED MYOCARDIAL INFARCTION) (H): ICD-10-CM

## 2022-10-25 LAB — BSA FOR ECHO PROCEDURE: 0 M2

## 2022-10-25 PROCEDURE — 250N000011 HC RX IP 250 OP 636: Performed by: INTERNAL MEDICINE

## 2022-10-25 PROCEDURE — 75574 CT ANGIO HRT W/3D IMAGE: CPT | Mod: 26 | Performed by: INTERNAL MEDICINE

## 2022-10-25 PROCEDURE — 250N000009 HC RX 250: Performed by: INTERNAL MEDICINE

## 2022-10-25 PROCEDURE — 75574 CT ANGIO HRT W/3D IMAGE: CPT

## 2022-10-25 PROCEDURE — 250N000013 HC RX MED GY IP 250 OP 250 PS 637: Performed by: INTERNAL MEDICINE

## 2022-10-25 RX ORDER — DILTIAZEM HYDROCHLORIDE 5 MG/ML
10 INJECTION INTRAVENOUS
Status: DISCONTINUED | OUTPATIENT
Start: 2022-10-25 | End: 2022-10-26 | Stop reason: HOSPADM

## 2022-10-25 RX ORDER — LIDOCAINE 40 MG/G
CREAM TOPICAL
Status: DISCONTINUED | OUTPATIENT
Start: 2022-10-25 | End: 2022-10-26 | Stop reason: HOSPADM

## 2022-10-25 RX ORDER — DILTIAZEM HYDROCHLORIDE 5 MG/ML
5 INJECTION INTRAVENOUS
Status: DISCONTINUED | OUTPATIENT
Start: 2022-10-25 | End: 2022-10-26 | Stop reason: HOSPADM

## 2022-10-25 RX ORDER — NITROGLYCERIN 0.4 MG/1
0.4 TABLET SUBLINGUAL ONCE
Status: COMPLETED | OUTPATIENT
Start: 2022-10-25 | End: 2022-10-25

## 2022-10-25 RX ORDER — IOPAMIDOL 755 MG/ML
100 INJECTION, SOLUTION INTRAVASCULAR ONCE
Status: COMPLETED | OUTPATIENT
Start: 2022-10-25 | End: 2022-10-25

## 2022-10-25 RX ORDER — METOPROLOL TARTRATE 1 MG/ML
5 INJECTION, SOLUTION INTRAVENOUS
Status: DISCONTINUED | OUTPATIENT
Start: 2022-10-25 | End: 2022-10-26 | Stop reason: HOSPADM

## 2022-10-25 RX ADMIN — METOPROLOL TARTRATE 5 MG: 5 INJECTION INTRAVENOUS at 07:46

## 2022-10-25 RX ADMIN — NITROGLYCERIN 0.4 MG: 0.4 TABLET, ORALLY DISINTEGRATING SUBLINGUAL at 07:44

## 2022-10-25 RX ADMIN — IOPAMIDOL 100 ML: 755 INJECTION, SOLUTION INTRAVENOUS at 07:54

## 2022-11-14 ENCOUNTER — HOSPITAL ENCOUNTER (OUTPATIENT)
Dept: ULTRASOUND IMAGING | Facility: HOSPITAL | Age: 62
Discharge: HOME | End: 2022-11-14
Attending: FAMILY MEDICINE
Payer: COMMERCIAL

## 2022-11-14 ENCOUNTER — OFFICE VISIT (OUTPATIENT)
Dept: CARDIOLOGY | Facility: CLINIC | Age: 62
End: 2022-11-14
Attending: INTERNAL MEDICINE
Payer: COMMERCIAL

## 2022-11-14 ENCOUNTER — ANCILLARY PROCEDURE (OUTPATIENT)
Dept: MAMMOGRAPHY | Facility: CLINIC | Age: 62
End: 2022-11-14
Attending: FAMILY MEDICINE
Payer: COMMERCIAL

## 2022-11-14 VITALS
OXYGEN SATURATION: 97 % | DIASTOLIC BLOOD PRESSURE: 80 MMHG | BODY MASS INDEX: 30.96 KG/M2 | RESPIRATION RATE: 18 BRPM | SYSTOLIC BLOOD PRESSURE: 126 MMHG | WEIGHT: 172 LBS | HEART RATE: 81 BPM

## 2022-11-14 DIAGNOSIS — I10 BENIGN ESSENTIAL HYPERTENSION: ICD-10-CM

## 2022-11-14 DIAGNOSIS — E78.00 PURE HYPERCHOLESTEROLEMIA: ICD-10-CM

## 2022-11-14 DIAGNOSIS — R94.39 ABNORMAL CARDIOVASCULAR STRESS TEST: Primary | ICD-10-CM

## 2022-11-14 DIAGNOSIS — N64.4 BREAST PAIN, RIGHT: ICD-10-CM

## 2022-11-14 DIAGNOSIS — I50.31 ACUTE HEART FAILURE WITH PRESERVED EJECTION FRACTION (HFPEF) (H): ICD-10-CM

## 2022-11-14 DIAGNOSIS — E04.1 THYROID NODULE: ICD-10-CM

## 2022-11-14 PROBLEM — Z79.899 HIGH RISK MEDICATION USE: Status: RESOLVED | Noted: 2017-07-13 | Resolved: 2022-11-14

## 2022-11-14 PROBLEM — I21.4 NSTEMI (NON-ST ELEVATED MYOCARDIAL INFARCTION) (H): Status: RESOLVED | Noted: 2022-09-12 | Resolved: 2022-11-14

## 2022-11-14 PROCEDURE — 99214 OFFICE O/P EST MOD 30 MIN: CPT | Performed by: INTERNAL MEDICINE

## 2022-11-14 PROCEDURE — 76642 ULTRASOUND BREAST LIMITED: CPT | Mod: RT

## 2022-11-14 PROCEDURE — 77066 DX MAMMO INCL CAD BI: CPT

## 2022-11-14 PROCEDURE — 76536 US EXAM OF HEAD AND NECK: CPT

## 2022-11-14 NOTE — PROGRESS NOTES
[facility]  Heart Care Clinic Follow-up Note    Assessment & Plan        (R94.39) Abnormal cardiovascular stress test  (primary encounter diagnosis)  Comment: Slightly elevated troponin at emergency room 0.188 with upper limit of normal being 0.027, normal at [facility].  Underwent nuclear stress test suggesting small area of jase-infarct distal anteroapical ischemia, this prompted coronary CTA showing no significant obstructive disease.  Would work on prevention.  Aspirin may be every other day, control lipids, work on weight loss.    (E78.00) Pure hypercholesterolemia  Comment: Total cholesterol 188 with an LDL of 101, was on atorvastatin but had some vague cough and was switched over to rosuvastatin.  Has not some vague right lower extremity discomfort.  Will take rosuvastatin holiday for 2 weeks, if legs get better try other agent for lipid lowering.    (I10) Benign essential hypertension  Comment: Under good control on HCTZ.    (I50.31) Acute heart failure with preserved ejection fraction (HFpEF) (H)  Comment: No significant signs or symptoms with preserved ejection fraction of 60 to 65%.  Did have elevated BNP in hospital.    Plan  1.  Rosuvastatin holiday for 2 weeks, if symptoms improve use other agent for lipid lowering.  2.  Orthopedist to consider injection of the spine.  3.  Follow-up with me in March, 6 months out from her event.  4.  Recheck fasting lipid profile after about 2 months of rosuvastatin.  5.  Work on weight loss.    Subjective  CC: 62-year-old white female here for hospital follow-up with her  present.  Since discharge she has been physically active helping remodel a room. Patient complains of no syncope, dizziness, fatigue, fevers, chest pain, palpitations, shortness of breath, PND, orthopnea, nausea, vomiting, or edema.    Medications  Current Outpatient Medications   Medication Sig Dispense Refill     aspirin (ASA) 81 MG EC tablet Take 1 tablet (81 mg) by mouth daily  30 tablet 0     cetirizine (ZYRTEC) 10 MG tablet Take 10 mg by mouth daily       hydrochlorothiazide (HYDRODIURIL) 25 MG tablet Take 1 tablet (25 mg) by mouth daily 90 tablet 0     Multiple Vitamins-Minerals (ONCOVITE) TABS Take 2 tablets by mouth daily       rosuvastatin (CRESTOR) 5 MG tablet Take 1 tablet (5 mg) by mouth At Bedtime 90 tablet 3     valACYclovir (VALTREX) 500 MG tablet Take 1 tablet (500 mg) by mouth 2 times daily for 3 days 30 tablet 3     vitamin D3 (CHOLECALCIFEROL) 250 mcg (16595 units) capsule Take 2 capsules by mouth daily       ZOLMitriptan (ZOMIG) 5 MG tablet Take 1 tablet (5 mg) by mouth at onset of headache for migraine May repeat in 2 hours. Max 2 tablets/24 hours. 12 tablet 3       Objective  /80 (BP Location: Right arm, Patient Position: Sitting, Cuff Size: Adult Regular)   Pulse 81   Resp 18   Wt 78 kg (172 lb)   SpO2 97%   BMI 30.96 kg/m      General Appearance:    Alert, cooperative, no distress, appears stated age   Head:    Normocephalic, without obvious abnormality, atraumatic   Throat:   Lips, mucosa, and tongue normal; teeth and gums normal   Neck:   Supple, symmetrical, trachea midline, no adenopathy;        thyroid:  No enlargement/tenderness/nodules; no carotid    bruit or JVD   Back:     Symmetric, no curvature, ROM normal, no CVA tenderness   Lungs:     Clear to auscultation bilaterally, respirations unlabored   Chest wall:    No tenderness or deformity   Heart:    Regular rate and rhythm, S1 and S2 normal, no murmur, rub   or gallop   Abdomen:     Soft, non-tender, bowel sounds active all four quadrants,     no masses, no organomegaly   Extremities:   Normal, atraumatic, no cyanosis or edema   Pulses:   2+ and symmetric all extremities   Skin:   Skin color, texture, turgor normal, no rashes or lesions     Results    Lab Results personally reviewed   Lab Results   Component Value Date    CHOL 188 10/03/2022    CHOL 210 (H) 09/13/2022     Lab Results   Component  Value Date    HDL 69 10/03/2022    HDL 57 09/13/2022     No components found for: LDLCALC  Lab Results   Component Value Date    TRIG 90 10/03/2022    TRIG 132 09/13/2022     Lab Results   Component Value Date    WBC 6.7 10/03/2022    HGB 13.1 10/03/2022    HCT 39.1 10/03/2022     10/03/2022     Lab Results   Component Value Date    BUN 24.5 (H) 10/03/2022     10/03/2022    CO2 28 10/03/2022

## 2022-11-14 NOTE — LETTER
11/14/2022    Minerva Lockhart MD  [address]  [facility] [address]    RE: Carole Reich       Dear Colleague,     I had the pleasure of seeing Carole Reich in the [facility].      [facility]  Heart Care Clinic Follow-up Note    Assessment & Plan        (R94.39) Abnormal cardiovascular stress test  (primary encounter diagnosis)  Comment: Slightly elevated troponin at emergency room 0.188 with upper limit of normal being 0.027, normal at [facility].  Underwent nuclear stress test suggesting small area of jase-infarct distal anteroapical ischemia, this prompted coronary CTA showing no significant obstructive disease.  Would work on prevention.  Aspirin may be every other day, control lipids, work on weight loss.    (E78.00) Pure hypercholesterolemia  Comment: Total cholesterol 188 with an LDL of 101, was on atorvastatin but had some vague cough and was switched over to rosuvastatin.  Has not some vague right lower extremity discomfort.  Will take rosuvastatin holiday for 2 weeks, if legs get better try other agent for lipid lowering.    (I10) Benign essential hypertension  Comment: Under good control on HCTZ.    (I50.31) Acute heart failure with preserved ejection fraction (HFpEF) (H)  Comment: No significant signs or symptoms with preserved ejection fraction of 60 to 65%.  Did have elevated BNP in hospital.    Plan  1.  Rosuvastatin holiday for 2 weeks, if symptoms improve use other agent for lipid lowering.  2.  Orthopedist to consider injection of the spine.  3.  Follow-up with me in March, 6 months out from her event.  4.  Recheck fasting lipid profile after about 2 months of rosuvastatin.  5.  Work on weight loss.    Subjective  CC: 62-year-old white female here for hospital follow-up with her  present.  Since discharge she has been physically active helping remodel a room. Patient complains of no syncope, dizziness, fatigue, fevers, chest pain, palpitations,  shortness of breath, PND, orthopnea, nausea, vomiting, or edema.    Medications  Current Outpatient Medications   Medication Sig Dispense Refill     aspirin (ASA) 81 MG EC tablet Take 1 tablet (81 mg) by mouth daily 30 tablet 0     cetirizine (ZYRTEC) 10 MG tablet Take 10 mg by mouth daily       hydrochlorothiazide (HYDRODIURIL) 25 MG tablet Take 1 tablet (25 mg) by mouth daily 90 tablet 0     Multiple Vitamins-Minerals (ONCOVITE) TABS Take 2 tablets by mouth daily       rosuvastatin (CRESTOR) 5 MG tablet Take 1 tablet (5 mg) by mouth At Bedtime 90 tablet 3     valACYclovir (VALTREX) 500 MG tablet Take 1 tablet (500 mg) by mouth 2 times daily for 3 days 30 tablet 3     vitamin D3 (CHOLECALCIFEROL) 250 mcg (99535 units) capsule Take 2 capsules by mouth daily       ZOLMitriptan (ZOMIG) 5 MG tablet Take 1 tablet (5 mg) by mouth at onset of headache for migraine May repeat in 2 hours. Max 2 tablets/24 hours. 12 tablet 3       Objective  /80 (BP Location: Right arm, Patient Position: Sitting, Cuff Size: Adult Regular)   Pulse 81   Resp 18   Wt 78 kg (172 lb)   SpO2 97%   BMI 30.96 kg/m      General Appearance:    Alert, cooperative, no distress, appears stated age   Head:    Normocephalic, without obvious abnormality, atraumatic   Throat:   Lips, mucosa, and tongue normal; teeth and gums normal   Neck:   Supple, symmetrical, trachea midline, no adenopathy;        thyroid:  No enlargement/tenderness/nodules; no carotid    bruit or JVD   Back:     Symmetric, no curvature, ROM normal, no CVA tenderness   Lungs:     Clear to auscultation bilaterally, respirations unlabored   Chest wall:    No tenderness or deformity   Heart:    Regular rate and rhythm, S1 and S2 normal, no murmur, rub   or gallop   Abdomen:     Soft, non-tender, bowel sounds active all four quadrants,     no masses, no organomegaly   Extremities:   Normal, atraumatic, no cyanosis or edema   Pulses:   2+ and symmetric all extremities   Skin:   Skin  color, texture, turgor normal, no rashes or lesions     Results    Lab Results personally reviewed   Lab Results   Component Value Date    CHOL 188 10/03/2022    CHOL 210 (H) 09/13/2022     Lab Results   Component Value Date    HDL 69 10/03/2022    HDL 57 09/13/2022     No components found for: LDLCALC  Lab Results   Component Value Date    TRIG 90 10/03/2022    TRIG 132 09/13/2022     Lab Results   Component Value Date    WBC 6.7 10/03/2022    HGB 13.1 10/03/2022    HCT 39.1 10/03/2022     10/03/2022     Lab Results   Component Value Date    BUN 24.5 (H) 10/03/2022     10/03/2022    CO2 28 10/03/2022               Thank you for allowing me to participate in the care of your patient.      Sincerely,     ADDISON KINCAID MD     [facility]  cc:   Addison Kincaid MD  [address], [address]  [place] [address]

## 2022-11-14 NOTE — PATIENT INSTRUCTIONS
Ms Carole Reich,  I enjoyed visiting with you again today.  I am glad to hear you are doing well.  Take a 2 week holiday off CRESTOR and see if legs get better.  Per our conversation arrange for lipids around December 20.  I will plan on seeing you March.  Jere Kincaid

## 2022-11-17 ENCOUNTER — TELEPHONE (OUTPATIENT)
Dept: FAMILY MEDICINE | Facility: CLINIC | Age: 62
End: 2022-11-17

## 2022-11-17 NOTE — TELEPHONE ENCOUNTER
LMTCB, please notify pt we have requested records from her most recent colonoscopy and have been unable to locate them. We have sent a release to [facility]. Please have pt request records so we can update her chart.

## 2022-11-22 ENCOUNTER — NURSE TRIAGE (OUTPATIENT)
Dept: NURSING | Facility: CLINIC | Age: 62
End: 2022-11-22

## 2022-11-22 NOTE — TELEPHONE ENCOUNTER
Rec'd a call on Thursday and Friday last week requesting that she called the clinic. No reason given.   Per chart review: clinic is attempting to obtain records from [facility]--have been unable to locate them. Patient is to contact clinic to request records so that we can update her chart.     Kamila Johnson RN Triage Nurse Advisor 3:19 PM 11/22/2022

## 2023-01-04 ENCOUNTER — MYC MEDICAL ADVICE (OUTPATIENT)
Dept: FAMILY MEDICINE | Facility: CLINIC | Age: 63
End: 2023-01-04

## 2023-01-04 DIAGNOSIS — I10 BENIGN ESSENTIAL HYPERTENSION: ICD-10-CM

## 2023-01-04 DIAGNOSIS — I50.31 ACUTE HEART FAILURE WITH PRESERVED EJECTION FRACTION (HFPEF) (H): ICD-10-CM

## 2023-01-05 RX ORDER — ROSUVASTATIN CALCIUM 5 MG/1
5 TABLET, COATED ORAL AT BEDTIME
Qty: 90 TABLET | Refills: 2 | Status: SHIPPED | OUTPATIENT
Start: 2023-01-05 | End: 2023-10-09

## 2023-01-05 RX ORDER — HYDROCHLOROTHIAZIDE 25 MG/1
25 TABLET ORAL DAILY
Qty: 90 TABLET | Refills: 2 | Status: SHIPPED | OUTPATIENT
Start: 2023-01-05 | End: 2023-10-09

## 2023-01-05 NOTE — TELEPHONE ENCOUNTER
Pended refills to requested pharmacy for you to review.    Al Turcios RN     [facility]

## 2023-05-17 ENCOUNTER — PATIENT OUTREACH (OUTPATIENT)
Dept: GASTROENTEROLOGY | Facility: CLINIC | Age: 63
End: 2023-05-17
Payer: COMMERCIAL

## 2023-05-17 DIAGNOSIS — Z12.11 SPECIAL SCREENING FOR MALIGNANT NEOPLASMS, COLON: Primary | ICD-10-CM

## 2023-05-17 NOTE — PROGRESS NOTES
"Ordering/Referring Provider: Minerva Lockhart  BMI: Estimated body mass index is 30.96 kg/m  as calculated from the following:    Height as of 10/3/22: 1.588 m (5' 2.5\").    Weight as of 11/14/22: 78 kg (172 lb).     Sedation:  Based on patients medical history patient is appropriate for   moderate sedation. If patient's BMI > 50 do not schedule in ASC.    Location:  Does patient have an LVAD?  No    Does patient have a history of moderate to severe sleep apnea?  No    Does patient have a history of asthma, COPD or any other lung disease?  No    Does patient have a history of cardiac disease?  Yes     In the past 6 months, has the patient been hospitalized for any cardiac issues including cardiomyopathy, heart attack, or stent placement?  No-    Does the patient have any implantable devices (pacemaker, ICD)?  No    Is pataient awaiting a heart or lung transplant?   No    Has patient had a stroke or transient ischemic attack in the last 6 months?   No-NSTEMI 8 months ago    Is the patient currently on dialysis?   No    Prep:  Previous prep (last colonoscopy):   MiraLAX (No Mag Citrate)    Quality of previous prep:   Good    Is patient currently on dialysis, is ESRD, or CKD stage 4/5?   No (standard prep)    Does patient have a diagnosis of diabetes?  No    Does patient have a diagnosis of cystic fibrosis (CF)?  No    BMI > 40?  No    Final Referral Status:  meets the criteria for placement of colonoscopy screening  referral order.      Referral order placed with the following recommendations:  Sedation: Moderate Sedation  Location Type: No Scheduling Restrictions  Prep: MiraLAX (No Mag Citrate)        "

## 2023-07-31 NOTE — ED NOTES
Bed: ED-20  Expected date:   Expected time:   Means of arrival:   Comments:  Chest pain elevated trop   Spoke to patient's daughter, ok per VICKEY (name and  of patient verified). She is calling to clarify why a different antibiotic was ordered. Discussed with daughter that since patient has had treatment with antibiotics for UTI recently, a new medication may be more effective in treating this organism, verbalizes understanding. She states patient will take first dose this afternoon with lunch. She is unsure if she should give medication a few hours apart, or wait until tomorrow. Advised daughter it is best to take every 12 hours for 5 days. Daughter states patient will take medication with dinner today and breakfast and dinner moving forward. Advised to call office with questions and take patient to immediate care with worsening symptoms or ER with severe symptoms, verbalizes understanding.

## 2023-10-07 DIAGNOSIS — I10 BENIGN ESSENTIAL HYPERTENSION: ICD-10-CM

## 2023-10-07 DIAGNOSIS — I50.31 ACUTE HEART FAILURE WITH PRESERVED EJECTION FRACTION (HFPEF) (H): ICD-10-CM

## 2023-10-09 RX ORDER — ROSUVASTATIN CALCIUM 5 MG/1
5 TABLET, COATED ORAL AT BEDTIME
Qty: 90 TABLET | Refills: 0 | Status: SHIPPED | OUTPATIENT
Start: 2023-10-09

## 2023-10-09 RX ORDER — HYDROCHLOROTHIAZIDE 25 MG/1
25 TABLET ORAL DAILY
Qty: 90 TABLET | Refills: 0 | Status: SHIPPED | OUTPATIENT
Start: 2023-10-09

## 2023-11-02 ENCOUNTER — PATIENT OUTREACH (OUTPATIENT)
Dept: GASTROENTEROLOGY | Facility: CLINIC | Age: 63
End: 2023-11-02
Payer: COMMERCIAL

## 2025-01-12 ENCOUNTER — HEALTH MAINTENANCE LETTER (OUTPATIENT)
Age: 65
End: 2025-01-12

## 2025-01-14 ENCOUNTER — PATIENT OUTREACH (OUTPATIENT)
Dept: GASTROENTEROLOGY | Facility: CLINIC | Age: 65
End: 2025-01-14
Payer: COMMERCIAL

## 2025-01-14 DIAGNOSIS — Z12.11 SPECIAL SCREENING FOR MALIGNANT NEOPLASMS, COLON: Primary | ICD-10-CM

## 2025-01-14 NOTE — PROGRESS NOTES
"[facility] Screening Colonoscopy Referral Review    Patient meets the inclusion criteria for screening colonoscopy standing order.    Ordering/Referring Provider:  Minreva Lockhart      BMI: Estimated body mass index is 30.96 kg/m  as calculated from the following:    Height as of 10/3/22: 1.588 m (5' 2.5\").    Weight as of 11/14/22: 78 kg (172 lb).     Sedation:  Does patient have any of the following conditions affecting sedation?  No medical conditions affecting sedation.    Previous Scopes:  Any previous recommendations or follow up needs based on previous scope?  na / No recommendations.    Medical Concerns to Postpone Order:  Does patient have any of the following medical concerns that should postpone/delay colonoscopy referral?  No medical conditions affecting colonoscopy referral.    Final Referral Details:  Based on patient's medical history patient is appropriate for referral order with moderate sedation. If patient's BMI > 50 do not schedule in [facility].  "

## 2025-02-09 ENCOUNTER — HEALTH MAINTENANCE LETTER (OUTPATIENT)
Age: 65
End: 2025-02-09

## (undated) DEVICE — TUBING SUCTION 12"X1/4" N612

## (undated) DEVICE — PEN MARKING SKIN FINE 31145942

## (undated) DEVICE — LINEN TOWEL PACK X5 5464

## (undated) DEVICE — GLOVE PROTEXIS W/NEU-THERA 8.5  2D73TE85

## (undated) DEVICE — NDL ANGIOCATH 20GA 1.25" 4056

## (undated) DEVICE — PACK OCULOPLATIC SEN15OCFSD

## (undated) DEVICE — ESU ELEC NDL 1" E1552

## (undated) DEVICE — ESU PENCIL W/SMOKE EVAC E2515HS

## (undated) DEVICE — SOL NACL 0.9% IRRIG 1000ML BOTTLE 07138-09

## (undated) DEVICE — SU MERSILENE 5-0 S-24 18" 1764G

## (undated) DEVICE — SU PLAIN FAST ABSORB 6-0 PC-1 18" 1916G

## (undated) DEVICE — SUCTION CANISTER MEDIVAC LINER 3000ML W/LID 65651-530

## (undated) RX ORDER — FENTANYL CITRATE 50 UG/ML
INJECTION, SOLUTION INTRAMUSCULAR; INTRAVENOUS
Status: DISPENSED
Start: 2017-12-28

## (undated) RX ORDER — HYDROCODONE BITARTRATE AND ACETAMINOPHEN 5; 325 MG/1; MG/1
TABLET ORAL
Status: DISPENSED
Start: 2017-12-28